# Patient Record
Sex: MALE | Race: WHITE | Employment: OTHER | URBAN - METROPOLITAN AREA
[De-identification: names, ages, dates, MRNs, and addresses within clinical notes are randomized per-mention and may not be internally consistent; named-entity substitution may affect disease eponyms.]

---

## 2018-07-24 ENCOUNTER — LAB ENCOUNTER (OUTPATIENT)
Dept: LAB | Facility: HOSPITAL | Age: 78
End: 2018-07-24
Attending: FAMILY MEDICINE
Payer: MEDICARE

## 2018-07-24 DIAGNOSIS — R17 SCLERAL ICTERUS: ICD-10-CM

## 2018-07-24 DIAGNOSIS — R11.0 NAUSEA: Primary | ICD-10-CM

## 2018-07-24 LAB
ALBUMIN SERPL-MCNC: 3.3 G/DL (ref 3.5–4.8)
ALBUMIN/GLOB SERPL: 0.7 {RATIO} (ref 1–2)
ALP LIVER SERPL-CCNC: 298 U/L (ref 45–117)
ALT SERPL-CCNC: 431 U/L (ref 17–63)
AMYLASE SERPL-CCNC: 43 U/L (ref 25–115)
ANION GAP SERPL CALC-SCNC: 9 MMOL/L (ref 0–18)
AST SERPL-CCNC: 147 U/L (ref 15–41)
BASOPHILS # BLD AUTO: 0.03 X10(3) UL (ref 0–0.1)
BASOPHILS NFR BLD AUTO: 0.6 %
BILIRUB SERPL-MCNC: 3.5 MG/DL (ref 0.1–2)
BUN BLD-MCNC: 13 MG/DL (ref 8–20)
BUN/CREAT SERPL: 10.8 (ref 10–20)
CALCIUM BLD-MCNC: 8.9 MG/DL (ref 8.3–10.3)
CHLORIDE SERPL-SCNC: 104 MMOL/L (ref 101–111)
CO2 SERPL-SCNC: 23 MMOL/L (ref 22–32)
CREAT BLD-MCNC: 1.2 MG/DL (ref 0.7–1.3)
EOSINOPHIL # BLD AUTO: 0.22 X10(3) UL (ref 0–0.3)
EOSINOPHIL NFR BLD AUTO: 4.1 %
ERYTHROCYTE [DISTWIDTH] IN BLOOD BY AUTOMATED COUNT: 13.8 % (ref 11.5–16)
GLOBULIN PLAS-MCNC: 4.5 G/DL (ref 2.5–3.7)
GLUCOSE BLD-MCNC: 261 MG/DL (ref 70–99)
HCT VFR BLD AUTO: 46.6 % (ref 37–53)
HGB BLD-MCNC: 14.6 G/DL (ref 13–17)
IMMATURE GRANULOCYTE COUNT: 0.01 X10(3) UL (ref 0–1)
IMMATURE GRANULOCYTE RATIO %: 0.2 %
LYMPHOCYTES # BLD AUTO: 1.35 X10(3) UL (ref 0.9–4)
LYMPHOCYTES NFR BLD AUTO: 25.3 %
M PROTEIN MFR SERPL ELPH: 7.8 G/DL (ref 6.1–8.3)
MCH RBC QN AUTO: 31.9 PG (ref 27–33.2)
MCHC RBC AUTO-ENTMCNC: 31.3 G/DL (ref 31–37)
MCV RBC AUTO: 102 FL (ref 80–99)
MONOCYTES # BLD AUTO: 0.47 X10(3) UL (ref 0.1–1)
MONOCYTES NFR BLD AUTO: 8.8 %
NEUTROPHIL ABS PRELIM: 3.25 X10 (3) UL (ref 1.3–6.7)
NEUTROPHILS # BLD AUTO: 3.25 X10(3) UL (ref 1.3–6.7)
NEUTROPHILS NFR BLD AUTO: 61 %
OSMOLALITY SERPL CALC.SUM OF ELEC: 291 MOSM/KG (ref 275–295)
PLATELET # BLD AUTO: 217 10(3)UL (ref 150–450)
POTASSIUM SERPL-SCNC: 4.3 MMOL/L (ref 3.6–5.1)
RBC # BLD AUTO: 4.57 X10(6)UL (ref 3.8–5.8)
RED CELL DISTRIBUTION WIDTH-SD: 52.9 FL (ref 35.1–46.3)
SODIUM SERPL-SCNC: 136 MMOL/L (ref 136–144)
WBC # BLD AUTO: 5.3 X10(3) UL (ref 4–13)

## 2018-07-24 PROCEDURE — 82150 ASSAY OF AMYLASE: CPT

## 2018-07-24 PROCEDURE — 85025 COMPLETE CBC W/AUTO DIFF WBC: CPT

## 2018-07-24 PROCEDURE — 80053 COMPREHEN METABOLIC PANEL: CPT

## 2018-07-24 PROCEDURE — 36415 COLL VENOUS BLD VENIPUNCTURE: CPT

## 2018-07-26 ENCOUNTER — ANESTHESIA EVENT (OUTPATIENT)
Dept: ENDOSCOPY | Facility: HOSPITAL | Age: 78
End: 2018-07-26

## 2018-07-26 ENCOUNTER — LAB ENCOUNTER (OUTPATIENT)
Dept: LAB | Facility: HOSPITAL | Age: 78
End: 2018-07-26
Attending: FAMILY MEDICINE
Payer: MEDICARE

## 2018-07-26 DIAGNOSIS — R97.8 OTHER ABNORMAL TUMOR MARKERS: ICD-10-CM

## 2018-07-26 DIAGNOSIS — K83.1 OBSTRUCTION OF BILE DUCT: Primary | ICD-10-CM

## 2018-07-26 DIAGNOSIS — K83.8 BILE DUCT PROLIFERATION: ICD-10-CM

## 2018-07-26 LAB
AFP-TM SERPL-MCNC: 1.1 NG/ML (ref ?–8)
ALBUMIN SERPL-MCNC: 3.2 G/DL (ref 3.5–4.8)
ALBUMIN/GLOB SERPL: 0.7 {RATIO} (ref 1–2)
ALP LIVER SERPL-CCNC: 308 U/L (ref 45–117)
ALT SERPL-CCNC: 448 U/L (ref 17–63)
ANION GAP SERPL CALC-SCNC: 8 MMOL/L (ref 0–18)
AST SERPL-CCNC: 172 U/L (ref 15–41)
BASOPHILS # BLD AUTO: 0.03 X10(3) UL (ref 0–0.1)
BASOPHILS NFR BLD AUTO: 0.6 %
BILIRUB SERPL-MCNC: 5.2 MG/DL (ref 0.1–2)
BUN BLD-MCNC: 10 MG/DL (ref 8–20)
BUN/CREAT SERPL: 8.2 (ref 10–20)
CALCIUM BLD-MCNC: 8.7 MG/DL (ref 8.3–10.3)
CANCER AG19-9 SERPL-ACNC: 66.1 U/ML (ref ?–37)
CEA SERPL-MCNC: 3.1 NG/ML (ref 0.5–5)
CHLORIDE SERPL-SCNC: 107 MMOL/L (ref 101–111)
CO2 SERPL-SCNC: 25 MMOL/L (ref 22–32)
CREAT BLD-MCNC: 1.22 MG/DL (ref 0.7–1.3)
EOSINOPHIL # BLD AUTO: 0.19 X10(3) UL (ref 0–0.3)
EOSINOPHIL NFR BLD AUTO: 4.1 %
ERYTHROCYTE [DISTWIDTH] IN BLOOD BY AUTOMATED COUNT: 13.8 % (ref 11.5–16)
GLOBULIN PLAS-MCNC: 4.4 G/DL (ref 2.5–3.7)
GLUCOSE BLD-MCNC: 287 MG/DL (ref 70–99)
HCT VFR BLD AUTO: 44 % (ref 37–53)
HGB BLD-MCNC: 14 G/DL (ref 13–17)
IMMATURE GRANULOCYTE COUNT: 0 X10(3) UL (ref 0–1)
IMMATURE GRANULOCYTE RATIO %: 0 %
LYMPHOCYTES # BLD AUTO: 1.19 X10(3) UL (ref 0.9–4)
LYMPHOCYTES NFR BLD AUTO: 25.5 %
M PROTEIN MFR SERPL ELPH: 7.6 G/DL (ref 6.1–8.3)
MCH RBC QN AUTO: 32.1 PG (ref 27–33.2)
MCHC RBC AUTO-ENTMCNC: 31.8 G/DL (ref 31–37)
MCV RBC AUTO: 100.9 FL (ref 80–99)
MONOCYTES # BLD AUTO: 0.45 X10(3) UL (ref 0.1–1)
MONOCYTES NFR BLD AUTO: 9.7 %
NEUTROPHIL ABS PRELIM: 2.8 X10 (3) UL (ref 1.3–6.7)
NEUTROPHILS # BLD AUTO: 2.8 X10(3) UL (ref 1.3–6.7)
NEUTROPHILS NFR BLD AUTO: 60.1 %
OSMOLALITY SERPL CALC.SUM OF ELEC: 300 MOSM/KG (ref 275–295)
PLATELET # BLD AUTO: 194 10(3)UL (ref 150–450)
POTASSIUM SERPL-SCNC: 4.1 MMOL/L (ref 3.6–5.1)
RBC # BLD AUTO: 4.36 X10(6)UL (ref 3.8–5.8)
RED CELL DISTRIBUTION WIDTH-SD: 51.6 FL (ref 35.1–46.3)
SODIUM SERPL-SCNC: 140 MMOL/L (ref 136–144)
WBC # BLD AUTO: 4.7 X10(3) UL (ref 4–13)

## 2018-07-26 PROCEDURE — 36415 COLL VENOUS BLD VENIPUNCTURE: CPT

## 2018-07-26 PROCEDURE — 82105 ALPHA-FETOPROTEIN SERUM: CPT

## 2018-07-26 PROCEDURE — 86301 IMMUNOASSAY TUMOR CA 19-9: CPT

## 2018-07-26 PROCEDURE — 85025 COMPLETE CBC W/AUTO DIFF WBC: CPT

## 2018-07-26 PROCEDURE — 82378 CARCINOEMBRYONIC ANTIGEN: CPT

## 2018-07-26 PROCEDURE — 80053 COMPREHEN METABOLIC PANEL: CPT

## 2018-07-26 RX ORDER — RIBOFLAVIN (VITAMIN B2) 100 MG
100 TABLET ORAL DAILY
COMMUNITY
End: 2018-08-03

## 2018-07-26 RX ORDER — SODIUM CHLORIDE, SODIUM LACTATE, POTASSIUM CHLORIDE, CALCIUM CHLORIDE 600; 310; 30; 20 MG/100ML; MG/100ML; MG/100ML; MG/100ML
INJECTION, SOLUTION INTRAVENOUS CONTINUOUS
Status: DISCONTINUED | OUTPATIENT
Start: 2018-07-27 | End: 2018-07-27

## 2018-07-26 RX ORDER — SODIUM CHLORIDE, SODIUM LACTATE, POTASSIUM CHLORIDE, CALCIUM CHLORIDE 600; 310; 30; 20 MG/100ML; MG/100ML; MG/100ML; MG/100ML
INJECTION, SOLUTION INTRAVENOUS CONTINUOUS
Status: CANCELLED | OUTPATIENT
Start: 2018-07-26

## 2018-07-26 RX ORDER — UBIDECARENONE 75 MG
250 CAPSULE ORAL DAILY
COMMUNITY
End: 2018-08-03

## 2018-07-26 RX ORDER — PIOGLITAZONEHYDROCHLORIDE 15 MG/1
15 TABLET ORAL DAILY
COMMUNITY
End: 2018-08-03

## 2018-07-26 RX ORDER — CHLORAL HYDRATE 500 MG
1000 CAPSULE ORAL DAILY
COMMUNITY
End: 2018-08-03

## 2018-07-27 ENCOUNTER — HOSPITAL ENCOUNTER (OUTPATIENT)
Facility: HOSPITAL | Age: 78
Discharge: HOME | End: 2018-07-28
Attending: INTERNAL MEDICINE | Admitting: INTERNAL MEDICINE
Payer: MEDICARE

## 2018-07-27 ENCOUNTER — APPOINTMENT (OUTPATIENT)
Dept: CT IMAGING | Facility: HOSPITAL | Age: 78
End: 2018-07-27
Attending: NURSE PRACTITIONER
Payer: MEDICARE

## 2018-07-27 ENCOUNTER — SURGERY (OUTPATIENT)
Age: 78
End: 2018-07-27

## 2018-07-27 ENCOUNTER — ANESTHESIA (OUTPATIENT)
Dept: ENDOSCOPY | Facility: HOSPITAL | Age: 78
End: 2018-07-27

## 2018-07-27 ENCOUNTER — APPOINTMENT (OUTPATIENT)
Dept: GENERAL RADIOLOGY | Facility: HOSPITAL | Age: 78
End: 2018-07-27
Attending: INTERNAL MEDICINE
Payer: MEDICARE

## 2018-07-27 ENCOUNTER — TELEPHONE (OUTPATIENT)
Dept: SURGERY | Facility: CLINIC | Age: 78
End: 2018-07-27

## 2018-07-27 DIAGNOSIS — R74.02 NONSPECIFIC ELEVATION OF LEVELS OF TRANSAMINASE OR LACTIC ACID DEHYDROGENASE (LDH): ICD-10-CM

## 2018-07-27 DIAGNOSIS — R93.3 ABNORMAL FINDING ON GI TRACT IMAGING: ICD-10-CM

## 2018-07-27 DIAGNOSIS — R74.01 NONSPECIFIC ELEVATION OF LEVELS OF TRANSAMINASE OR LACTIC ACID DEHYDROGENASE (LDH): ICD-10-CM

## 2018-07-27 DIAGNOSIS — R17 JAUNDICE: ICD-10-CM

## 2018-07-27 LAB
EST. AVERAGE GLUCOSE BLD GHB EST-MCNC: 197 MG/DL (ref 68–126)
GLUCOSE BLD-MCNC: 179 MG/DL (ref 65–99)
GLUCOSE BLD-MCNC: 184 MG/DL (ref 65–99)
GLUCOSE BLD-MCNC: 212 MG/DL (ref 65–99)
GLUCOSE BLD-MCNC: 221 MG/DL (ref 65–99)
GLUCOSE BLD-MCNC: 241 MG/DL (ref 65–99)
HBA1C MFR BLD HPLC: 8.5 % (ref ?–5.7)

## 2018-07-27 PROCEDURE — 74178 CT ABD&PLV WO CNTR FLWD CNTR: CPT | Performed by: NURSE PRACTITIONER

## 2018-07-27 PROCEDURE — 0F7D8DZ DILATION OF PANCREATIC DUCT WITH INTRALUMINAL DEVICE, VIA NATURAL OR ARTIFICIAL OPENING ENDOSCOPIC: ICD-10-PCS | Performed by: INTERNAL MEDICINE

## 2018-07-27 PROCEDURE — 99205 OFFICE O/P NEW HI 60 MIN: CPT | Performed by: INTERNAL MEDICINE

## 2018-07-27 PROCEDURE — 74328 X-RAY BILE DUCT ENDOSCOPY: CPT | Performed by: INTERNAL MEDICINE

## 2018-07-27 PROCEDURE — 99220 INITIAL OBSERVATION CARE,LEVL III: CPT | Performed by: HOSPITALIST

## 2018-07-27 PROCEDURE — 0F9G8ZX DRAINAGE OF PANCREAS, VIA NATURAL OR ARTIFICIAL OPENING ENDOSCOPIC, DIAGNOSTIC: ICD-10-PCS | Performed by: INTERNAL MEDICINE

## 2018-07-27 PROCEDURE — 0F778DZ DILATION OF COMMON HEPATIC DUCT WITH INTRALUMINAL DEVICE, VIA NATURAL OR ARTIFICIAL OPENING ENDOSCOPIC: ICD-10-PCS | Performed by: INTERNAL MEDICINE

## 2018-07-27 DEVICE — GEENEN SOF-FLEX PANCREATIC STENT
Type: IMPLANTABLE DEVICE | Status: FUNCTIONAL
Brand: GEENEN SOF-FLEX

## 2018-07-27 DEVICE — BILIARY STENT WITH NAVIFLEXTM RX DELIVERY SYSTEM
Type: IMPLANTABLE DEVICE | Status: FUNCTIONAL
Brand: ADVANIX™ BILIARY

## 2018-07-27 RX ORDER — MORPHINE SULFATE 4 MG/ML
2 INJECTION, SOLUTION INTRAMUSCULAR; INTRAVENOUS EVERY 5 MIN PRN
Status: DISCONTINUED | OUTPATIENT
Start: 2018-07-27 | End: 2018-07-27 | Stop reason: HOSPADM

## 2018-07-27 RX ORDER — NALOXONE HYDROCHLORIDE 0.4 MG/ML
80 INJECTION, SOLUTION INTRAMUSCULAR; INTRAVENOUS; SUBCUTANEOUS AS NEEDED
Status: DISCONTINUED | OUTPATIENT
Start: 2018-07-27 | End: 2018-07-27 | Stop reason: HOSPADM

## 2018-07-27 RX ORDER — SODIUM CHLORIDE 9 MG/ML
INJECTION, SOLUTION INTRAVENOUS CONTINUOUS
Status: DISCONTINUED | OUTPATIENT
Start: 2018-07-27 | End: 2018-07-28

## 2018-07-27 RX ORDER — DEXTROSE MONOHYDRATE 25 G/50ML
50 INJECTION, SOLUTION INTRAVENOUS
Status: DISCONTINUED | OUTPATIENT
Start: 2018-07-27 | End: 2018-07-27 | Stop reason: HOSPADM

## 2018-07-27 RX ORDER — ONDANSETRON 2 MG/ML
4 INJECTION INTRAMUSCULAR; INTRAVENOUS AS NEEDED
Status: DISCONTINUED | OUTPATIENT
Start: 2018-07-27 | End: 2018-07-27 | Stop reason: HOSPADM

## 2018-07-27 RX ORDER — HYDROCODONE BITARTRATE AND ACETAMINOPHEN 5; 325 MG/1; MG/1
2 TABLET ORAL EVERY 4 HOURS PRN
Status: DISCONTINUED | OUTPATIENT
Start: 2018-07-27 | End: 2018-07-28

## 2018-07-27 RX ORDER — SODIUM CHLORIDE, SODIUM LACTATE, POTASSIUM CHLORIDE, CALCIUM CHLORIDE 600; 310; 30; 20 MG/100ML; MG/100ML; MG/100ML; MG/100ML
INJECTION, SOLUTION INTRAVENOUS CONTINUOUS
Status: DISCONTINUED | OUTPATIENT
Start: 2018-07-27 | End: 2018-07-28

## 2018-07-27 RX ORDER — INSULIN ASPART 100 [IU]/ML
INJECTION, SOLUTION INTRAVENOUS; SUBCUTANEOUS
Status: COMPLETED
Start: 2018-07-27 | End: 2018-07-27

## 2018-07-27 RX ORDER — LISINOPRIL 10 MG/1
10 TABLET ORAL NIGHTLY
Status: DISCONTINUED | OUTPATIENT
Start: 2018-07-27 | End: 2018-07-28

## 2018-07-27 RX ORDER — METOCLOPRAMIDE HYDROCHLORIDE 5 MG/ML
10 INJECTION INTRAMUSCULAR; INTRAVENOUS AS NEEDED
Status: DISCONTINUED | OUTPATIENT
Start: 2018-07-27 | End: 2018-07-27 | Stop reason: HOSPADM

## 2018-07-27 RX ORDER — HYDROCODONE BITARTRATE AND ACETAMINOPHEN 5; 325 MG/1; MG/1
1 TABLET ORAL EVERY 4 HOURS PRN
Status: DISCONTINUED | OUTPATIENT
Start: 2018-07-27 | End: 2018-07-28

## 2018-07-27 RX ORDER — HYDRALAZINE HYDROCHLORIDE 20 MG/ML
10 INJECTION INTRAMUSCULAR; INTRAVENOUS EVERY 6 HOURS PRN
Status: DISCONTINUED | OUTPATIENT
Start: 2018-07-27 | End: 2018-07-28

## 2018-07-27 RX ORDER — DEXAMETHASONE SODIUM PHOSPHATE 4 MG/ML
4 VIAL (ML) INJECTION AS NEEDED
Status: DISCONTINUED | OUTPATIENT
Start: 2018-07-27 | End: 2018-07-27 | Stop reason: HOSPADM

## 2018-07-27 RX ORDER — DEXTROSE MONOHYDRATE 25 G/50ML
50 INJECTION, SOLUTION INTRAVENOUS
Status: DISCONTINUED | OUTPATIENT
Start: 2018-07-27 | End: 2018-07-28

## 2018-07-27 RX ORDER — INSULIN ASPART 100 [IU]/ML
INJECTION, SOLUTION INTRAVENOUS; SUBCUTANEOUS ONCE
Status: COMPLETED | OUTPATIENT
Start: 2018-07-27 | End: 2018-07-27

## 2018-07-27 RX ORDER — LEVOTHYROXINE SODIUM 0.05 MG/1
50 TABLET ORAL
Status: DISCONTINUED | OUTPATIENT
Start: 2018-07-27 | End: 2018-07-28

## 2018-07-27 NOTE — OPERATIVE REPORT
Jose Alfredo Le Patient Status:  Hospital Outpatient Surgery    11/3/1940 MRN [id number]   [facility] ENDOSCOPY Attending Diane Yoder MD   Hosp Day # 0 PCP Niecy Walker MD     PREOPERATIVE DIAGNOSIS/INDICATION: Obstructive Donalynn Sender edge of a large diverticulum  - ECHO: Imaging was performed through the esophagus, stomach and duodenum.  The subcarinal space and the aortopulmonary window appear wnl, the celiac axis and the left adrenal gland appear wnl, the visualized portions of the le

## 2018-07-27 NOTE — PLAN OF CARE
Patient/Family Goals    • Patient/Family Long Term Goal Not Progressing    • Patient/Family Short Term Goal Not Progressing        Assumed care of patient at 1000. Patient is alert and oriented x4. SB on tele. Oxygenation is 100% on RA. L sounds clear.  VSS

## 2018-07-27 NOTE — PROGRESS NOTES
Full consult dictated. Pt with new onset obstructive jaundice. Admitted and underwent EGD/ERCP/Stent placement and EUS and biopsy with Dr Gabby Hampton. Initial impression of cytology is suspicious. Await final path. CT just done.   There is a pancreatic he

## 2018-07-27 NOTE — OPERATIVE REPORT
Jose Alfredo Le Patient Status:  Hospital Outpatient Surgery    11/3/1940 MRN [id number]   [facility] ENDOSCOPY Attending Diane Yoder MD   Hosp Day # 0 PCP Niecy Walker MD     PREOPERATIVE DIAGNOSIS/INDICATION: Pancreas head ma cannulation was performed with the help of a 0.035 straight Hydra jagwire 260cm in length, an adequate biliary sphincterotomy was performed with standard ERBE settings, there were no immediate complication noted.  We placed a biliary Cotton-Obando plastic st

## 2018-07-27 NOTE — ANESTHESIA POSTPROCEDURE EVALUATION
[address]  Patient Status:  Hospital Outpatient Surgery   Age/Gender 65year old male MRN [id number]   Location [address] [place] [address]. Attending Sammy Rhodes MD   Hosp Day # 0 PCP Sheela Bal MD       Anesthesia Post-op

## 2018-07-27 NOTE — H&P
[facility] HOSPITALIST  History and Physical     Gypsy Rogers Patient Status:  Observation    11/3/1940 MRN [id number]   [facility] 8NE-A Attending Tim Perez MD   Hosp Day # 0 PCP Francesca Mae MD     Chief Complaint: pancreati grafts    • Prostatitis    • Sputum production    • Stress    • Thyroid disease    • Type II or unspecified type diabetes mellitus without mention of complication, not stated as uncontrolled    • Unspecified essential hypertension    • Visual impairment comprehensive 14 point review of systems was completed. Pertinent positives and negatives noted in the HPI.     Physical Exam:    BP (!) 177/80   Pulse 55   Temp 98.7 °F (37.1 °C) (Oral)   Resp 11   Ht 6' (1.829 m)   Wt 218 lb (98.9 kg)   SpO2 95%   BMI today  6. Continue NPO until CT completed  2. Essential hypertension  1. Resume lisinopril and veramapril  2. PRN hydralazine  3. DM type II  1. Hyperglycemia protocol  4. Elevated LFTs, obstructive jaundice due to #1  5.  Hypothyroidism  1. levothyroxine

## 2018-07-27 NOTE — PROGRESS NOTES
07/27/18 1249   Clinical Encounter Type   Referral From Nurse   [facility] To ([facility])   [address] will remain available at the pager # [phone] for further care/support

## 2018-07-27 NOTE — PROGRESS NOTES
07/27/18 1342   Clinical Encounter Type   Visited With Health care provider;Patient and family together  (RN, daughter)   Routine Visit Introduction   Continue Visiting No    met with patient and daughter.   Patient stated that he wanted to compl

## 2018-07-27 NOTE — ANESTHESIA PREPROCEDURE EVALUATION
PRE-OP EVALUATION    Patient Name: Kaitlynn Monk    Pre-op Diagnosis: Nonspecific elevation of levels of transaminase or lactic acid dehydrogenase (LDH) [R74.0]  Jaundice [R17]  Abnormal finding on GI tract imaging [R93.3]    Procedure(s):  ENDOSCOPIC nightly. Disp:  Rfl:    VERAPAMIL HCL ER, CO, OR Take 100 mg by mouth nightly. Disp:  Rfl:    Cetirizine HCl (ZYRTEC OR) Take 10 mg by mouth daily. Disp:  Rfl:    VENLAFAXINE HCL ER OR Take 75 mg by mouth daily.    Disp:  Rfl:    aspirin 325 MG Oral T Pulmonary      Breath sounds clear to auscultation bilaterally. Other findings            ASA: 3   Plan: MAC  NPO status verified and patient meets guidelines. Patient has not taken beta blockers in last 24 hours.   Post-procedure pain manage

## 2018-07-27 NOTE — PROGRESS NOTES
07/27/18 1412   Clinical Encounter Type   Visited With Family   Routine Visit Follow-up   Sacramental Encounters   Sacrament of Sick-Anointing Anointed   The patient was seen by Radha Mcdaniel.  Received prayer, Scripture, support and Sacrament

## 2018-07-27 NOTE — H&P
[facility] Patient Status:  Hospital Outpatient Surgery    11/3/1940 MRN [id number]   [facility] ENDOSCOPY Attending Derrick Sanches MD   Hosp Day # 0 PCP Rona Beasley MD     CC: Luca Rocha MAIN               OR  No date: SKIN SURGERY  No date: TONSILLECTOMY  No date: TOTAL HIP REPLACEMENT  Family History   Problem Relation Age of Onset   • Diabetes Father    • Cancer Father    • Cancer Mother    • Cancer Sister       reports that he has neve injection 10 mg, 10 mg, Intravenous, PRN  •  dexamethasone Sodium Phosphate (DECADRON) 4 MG/ML injection 4 mg, 4 mg, Intravenous, PRN  •  Naloxone HCl (NARCAN) 0.4 MG/ML injection 80 mcg, 80 mcg, Intravenous, PRN    Physical Exam:    Blood pressure (!) 168

## 2018-07-28 ENCOUNTER — APPOINTMENT (OUTPATIENT)
Dept: MRI IMAGING | Facility: HOSPITAL | Age: 78
End: 2018-07-28
Attending: INTERNAL MEDICINE
Payer: MEDICARE

## 2018-07-28 VITALS
SYSTOLIC BLOOD PRESSURE: 162 MMHG | OXYGEN SATURATION: 94 % | BODY MASS INDEX: 29.42 KG/M2 | WEIGHT: 217.19 LBS | HEIGHT: 72 IN | TEMPERATURE: 99 F | DIASTOLIC BLOOD PRESSURE: 67 MMHG | RESPIRATION RATE: 16 BRPM | HEART RATE: 62 BPM

## 2018-07-28 LAB
ALBUMIN SERPL-MCNC: 2.8 G/DL (ref 3.5–4.8)
ALBUMIN/GLOB SERPL: 0.7 {RATIO} (ref 1–2)
ALP LIVER SERPL-CCNC: 248 U/L (ref 45–117)
ALT SERPL-CCNC: 302 U/L (ref 17–63)
ANION GAP SERPL CALC-SCNC: 7 MMOL/L (ref 0–18)
AST SERPL-CCNC: 65 U/L (ref 15–41)
BILIRUB SERPL-MCNC: 2.6 MG/DL (ref 0.1–2)
BUN BLD-MCNC: 10 MG/DL (ref 8–20)
BUN/CREAT SERPL: 8.1 (ref 10–20)
CALCIUM BLD-MCNC: 8.4 MG/DL (ref 8.3–10.3)
CHLORIDE SERPL-SCNC: 107 MMOL/L (ref 101–111)
CO2 SERPL-SCNC: 29 MMOL/L (ref 22–32)
CREAT BLD-MCNC: 1.23 MG/DL (ref 0.7–1.3)
GLOBULIN PLAS-MCNC: 3.8 G/DL (ref 2.5–3.7)
GLUCOSE BLD-MCNC: 182 MG/DL (ref 65–99)
GLUCOSE BLD-MCNC: 186 MG/DL (ref 65–99)
GLUCOSE BLD-MCNC: 188 MG/DL (ref 70–99)
M PROTEIN MFR SERPL ELPH: 6.6 G/DL (ref 6.1–8.3)
OSMOLALITY SERPL CALC.SUM OF ELEC: 300 MOSM/KG (ref 275–295)
POTASSIUM SERPL-SCNC: 3.6 MMOL/L (ref 3.6–5.1)
SODIUM SERPL-SCNC: 143 MMOL/L (ref 136–144)

## 2018-07-28 PROCEDURE — 99214 OFFICE O/P EST MOD 30 MIN: CPT | Performed by: INTERNAL MEDICINE

## 2018-07-28 PROCEDURE — 99217 OBSERVATION CARE DISCHARGE: CPT | Performed by: INTERNAL MEDICINE

## 2018-07-28 PROCEDURE — 74183 MRI ABD W/O CNTR FLWD CNTR: CPT | Performed by: INTERNAL MEDICINE

## 2018-07-28 RX ORDER — LEVOFLOXACIN 500 MG/1
500 TABLET, FILM COATED ORAL DAILY
Qty: 7 TABLET | Refills: 0 | Status: SHIPPED | OUTPATIENT
Start: 2018-07-28 | End: 2018-08-24 | Stop reason: ALTCHOICE

## 2018-07-28 RX ORDER — HYDROCODONE BITARTRATE AND ACETAMINOPHEN 5; 325 MG/1; MG/1
1 TABLET ORAL EVERY 4 HOURS PRN
Qty: 100 TABLET | Refills: 0 | Status: SHIPPED | OUTPATIENT
Start: 2018-07-28 | End: 2018-09-07

## 2018-07-28 NOTE — PROGRESS NOTES
[facility] HOSPITALIST  Progress Note     Darien Connjaspreet Patient Status:  Observation    11/3/1940 MRN [id number]   [facility] 8NE-A Attending Martin Murphy MD   Hosp Day # 0 PCP Jerel Everett MD     Chief Complaint: pancreatic head mas breakfast   • lisinopril  10 mg Oral Nightly   • Venlafaxine HCl ER  75 mg Oral Daily   • Verapamil HCl ER  120 mg Oral Nightly   • insulin detemir  45 Units Subcutaneous Daily   • Insulin Aspart Pen  1-10 Units Subcutaneous TID AC and HS       ASSESSMENT

## 2018-07-28 NOTE — PLAN OF CARE
Problem: GASTROINTESTINAL - ADULT  Goal: Minimal or absence of nausea and vomiting  INTERVENTIONS:  - Maintain adequate hydration with IV or PO as ordered and tolerated  - Nasogastric tube to low intermittent suction as ordered  - Evaluate effectiveness of eating and he would eat solid food it wouldn't stay down. He's been on liquids and has been able to keep it all down and stated his urine output increased but it remains dark in color - it is phu but clear. He c/o pain to his back and norco helps.   The

## 2018-07-28 NOTE — CONSULTS
[facility]    PATIENT'S NAME: Vandana oYussef   ATTENDING PHYSICIAN: Ole Bynum MD   CONSULTING PHYSICIAN: Sharlene Mata M.D.    PATIENT ACCOUNT#:   [de-identified]    LOCATION:  [address] [place] [address]  MEDICAL RECORD #:   [id number]       DATE OF BIR area of decreased attenuation near the falciform ligament, medial segment of the left lobe in hepatic segment 4. It is either focal fat or neoplasm.  Recommended a followup MRI with [facility].  He had a pancreatic head mass that was 2.8 x 2.9 x 2.3 cm; it abu father having  at the age of 72; pneumonia, COPD; he had diabetes and leukemia. His mother  at the age of 77; she had Parkinson disease and fell. He is 1 of 4 siblings. He has one brother who  as a baby.   He has one brother who  at 64 of that the patient has a primary pancreatic carcinoma. I explained this to them. I also explained this is a difficult cancer for us to treat.   The majority of patients who are diagnosed with pancreatic cancer are unresectable at the time of diagnosis, ofte Wednesday. Dictated By Heike Fregoso M.D.  d: 07/27/2018 17:21:59  t: 07/27/2018 17:43:22  Job [id number]  /    cc: Altamease Hertz, M.D. Alphia Panda, M.D. Dayne Lash, M.D. Phillip Rinne, M.D.

## 2018-07-28 NOTE — PROGRESS NOTES
Patient was given discharge instructions in regards to medications, s/s to monitor for, follow up appointments, and activity level. All questions were answered. IV was removed. Patient was removed from tele. Patient was escorted off the unit by transport.

## 2018-07-28 NOTE — DISCHARGE SUMMARY
[facility] HOSPITALIST  DISCHARGE SUMMARY     Gypsy Rogers Patient Status:  Observation    11/3/1940 MRN [id number]   [facility] 8NE-A Attending Brady Martinez MD   Hosp Day # 0 PCP Francesca Mae MD     Date of Admission: 2018  Da intensity. He states it does radiate to his back. He denies any nausea, headache, tingling or numbness    Brief Synopsis: Pt was admitted after procedure and was seen by GI and oncology.  He underwent CT and MRI prior to DC and will follow up with GI and by mouth daily. Refills:  0     Pioglitazone HCl 15 MG Tabs  Commonly known as:  ACTOS  Notes to patient:  Diabetes medication      Take 15 mg by mouth daily.    Refills:  0     VENLAFAXINE HCL ER OR  Notes to patient:  Muscle relaxer/ chill pill      Eloina Watts

## 2018-07-28 NOTE — CM/SW NOTE
Briefly met with patient and daughters at bedside. Patient expressed interest in care giver support as his wife just completed rehab at [facility] for a stroke and he is the primary caregiver.   Cognitively, patient alert, just 'doesn't want to leave her a

## 2018-07-28 NOTE — PROGRESS NOTES
[facility]  Progress Note    Taj Rodriguez Patient Status:  Outpatient in a Bed    11/3/1940 MRN [id number]   [facility] 8NE-A Attending David Armstrong MD   Hosp Day # 0 PCP Dona Alexander MD       SUBJECTIVE:    C/o slight back distress. Chest: Clear to auscultation. Heart: Regular rate and rhythm. Abdomen: Soft, non tender with good bowel sounds. Extremities: No edema. Neurological: Grossly intact.      RADIOLOGY:    ASSESSMENT/PLAN:    # Obstructive jaundice: S/p ERCP, zee

## 2018-07-30 ENCOUNTER — TELEPHONE (OUTPATIENT)
Dept: HEMATOLOGY/ONCOLOGY | Facility: HOSPITAL | Age: 78
End: 2018-07-30

## 2018-07-30 RX ORDER — LEVOFLOXACIN 500 MG/1
TABLET, FILM COATED ORAL
Qty: 7 TABLET | Refills: 0 | OUTPATIENT
Start: 2018-07-30

## 2018-07-30 NOTE — CM/SW NOTE
As follow up, call placed to patient regarding caregiver recommendations after speaking with the outpatient cancer care social workers.   Informed the patient that they have received positive feedback from patients/families that have used visiting angels (6

## 2018-07-31 ENCOUNTER — APPOINTMENT (OUTPATIENT)
Dept: LAB | Facility: HOSPITAL | Age: 78
End: 2018-07-31
Attending: STUDENT IN AN ORGANIZED HEALTH CARE EDUCATION/TRAINING PROGRAM
Payer: MEDICARE

## 2018-07-31 ENCOUNTER — HOSPITAL ENCOUNTER (OUTPATIENT)
Dept: GENERAL RADIOLOGY | Facility: HOSPITAL | Age: 78
Discharge: HOME | End: 2018-07-31
Attending: STUDENT IN AN ORGANIZED HEALTH CARE EDUCATION/TRAINING PROGRAM
Payer: MEDICARE

## 2018-07-31 DIAGNOSIS — R93.3 ABNORMAL FINDING ON GI TRACT IMAGING: ICD-10-CM

## 2018-07-31 DIAGNOSIS — R17 JAUNDICE: ICD-10-CM

## 2018-07-31 LAB
ALBUMIN SERPL-MCNC: 2.9 G/DL (ref 3.5–4.8)
ALBUMIN/GLOB SERPL: 0.6 {RATIO} (ref 1–2)
ALP LIVER SERPL-CCNC: 226 U/L (ref 45–117)
ALT SERPL-CCNC: 140 U/L (ref 17–63)
ANION GAP SERPL CALC-SCNC: 9 MMOL/L (ref 0–18)
AST SERPL-CCNC: 24 U/L (ref 15–41)
BILIRUB SERPL-MCNC: 1.4 MG/DL (ref 0.1–2)
BUN BLD-MCNC: 19 MG/DL (ref 8–20)
BUN/CREAT SERPL: 15.3 (ref 10–20)
CALCIUM BLD-MCNC: 8.5 MG/DL (ref 8.3–10.3)
CHLORIDE SERPL-SCNC: 99 MMOL/L (ref 101–111)
CO2 SERPL-SCNC: 30 MMOL/L (ref 22–32)
CREAT BLD-MCNC: 1.24 MG/DL (ref 0.7–1.3)
GLOBULIN PLAS-MCNC: 4.5 G/DL (ref 2.5–3.7)
GLUCOSE BLD-MCNC: 386 MG/DL (ref 70–99)
M PROTEIN MFR SERPL ELPH: 7.4 G/DL (ref 6.1–8.3)
OSMOLALITY SERPL CALC.SUM OF ELEC: 304 MOSM/KG (ref 275–295)
POTASSIUM SERPL-SCNC: 4 MMOL/L (ref 3.6–5.1)
SODIUM SERPL-SCNC: 138 MMOL/L (ref 136–144)

## 2018-07-31 PROCEDURE — 36415 COLL VENOUS BLD VENIPUNCTURE: CPT

## 2018-07-31 PROCEDURE — 80053 COMPREHEN METABOLIC PANEL: CPT

## 2018-07-31 PROCEDURE — 74021 RADEX ABDOMEN 3+ VIEWS: CPT | Performed by: STUDENT IN AN ORGANIZED HEALTH CARE EDUCATION/TRAINING PROGRAM

## 2018-08-01 ENCOUNTER — TELEPHONE (OUTPATIENT)
Dept: HEMATOLOGY/ONCOLOGY | Facility: HOSPITAL | Age: 78
End: 2018-08-01

## 2018-08-01 ENCOUNTER — HOSPITAL ENCOUNTER (OUTPATIENT)
Dept: CT IMAGING | Facility: HOSPITAL | Age: 78
Discharge: HOME | End: 2018-08-01
Attending: CLINICAL NURSE SPECIALIST
Payer: MEDICARE

## 2018-08-01 DIAGNOSIS — K86.89 PANCREATIC MASS: ICD-10-CM

## 2018-08-01 DIAGNOSIS — R91.8 MULTIPLE LUNG NODULES ON CT: Primary | ICD-10-CM

## 2018-08-01 DIAGNOSIS — R91.8 MULTIPLE LUNG NODULES ON CT: ICD-10-CM

## 2018-08-01 PROCEDURE — 71260 CT THORAX DX C+: CPT | Performed by: CLINICAL NURSE SPECIALIST

## 2018-08-01 NOTE — TELEPHONE ENCOUNTER
Spoke with patient's daughter regarding plan: patient to get CT of Chest prior to Friday. MRI liver was negative. Transferred her to scheduling to arrange CT of Chest, she will call if issues scheduling. They will see Dr Kendell Waite on Friday.

## 2018-08-03 ENCOUNTER — OFFICE VISIT (OUTPATIENT)
Dept: HEMATOLOGY/ONCOLOGY | Facility: HOSPITAL | Age: 78
End: 2018-08-03
Attending: INTERNAL MEDICINE
Payer: MEDICARE

## 2018-08-03 VITALS
RESPIRATION RATE: 18 BRPM | WEIGHT: 217.81 LBS | HEART RATE: 76 BPM | TEMPERATURE: 98 F | OXYGEN SATURATION: 92 % | HEIGHT: 72.01 IN | DIASTOLIC BLOOD PRESSURE: 74 MMHG | BODY MASS INDEX: 29.5 KG/M2 | SYSTOLIC BLOOD PRESSURE: 144 MMHG

## 2018-08-03 DIAGNOSIS — C25.0 MALIGNANT NEOPLASM OF HEAD OF PANCREAS (HCC): Primary | ICD-10-CM

## 2018-08-03 PROCEDURE — 99214 OFFICE O/P EST MOD 30 MIN: CPT | Performed by: INTERNAL MEDICINE

## 2018-08-03 NOTE — PROGRESS NOTES
Patient is here for MD post hospital f/u. Dx with a pancreatic mass with obstructive jaundice. Patient had an ERCP with stent placement. Jaundice improved. Pain and nausea have improved. Appetite is good. No longer having headaches.  Patient had a ct scan o

## 2018-08-07 NOTE — PROGRESS NOTES
[facility]    PATIENT'S NAME: Addis Pereiraclair   ATTENDING PHYSICIAN: Godfrey Batres M.D.    PATIENT ACCOUNT #: [de-identified] LOCATION: [address] [place] [address] RECORD #: [id number] YOB: 1940   DATE OF SERVICE: 08/03/2018       CANCER CE Blood pressure is 144/74, pulse 76, respiratory rate is 20, temperature is 98. 1. HEENT:  He has no jaundice. LUNGS:  Clear. HEART:  Normal.   ABDOMEN:  No hepatosplenomegaly or tenderness. EXTREMITIES:  He has no clubbing, cyanosis, or edema.      LA visit and tentatively starting treatment on the August 10.       Dictated By Gi Sy M.D.  d: 08/06/2018 14:20:56  t: 08/07/2018 05:03:57  [facility] [id number]  [place]/    cc: Purvi Machuca M.D.

## 2018-08-08 ENCOUNTER — HOSPITAL ENCOUNTER (OUTPATIENT)
Facility: HOSPITAL | Age: 78
Setting detail: OBSERVATION
Discharge: HOME | End: 2018-08-09
Attending: EMERGENCY MEDICINE | Admitting: HOSPITALIST
Payer: MEDICARE

## 2018-08-08 DIAGNOSIS — R74.8 ELEVATED LIVER ENZYMES: ICD-10-CM

## 2018-08-08 DIAGNOSIS — K86.89 PANCREATIC MASS: ICD-10-CM

## 2018-08-08 DIAGNOSIS — R50.82 POST-PROCEDURAL FEVER: Primary | ICD-10-CM

## 2018-08-08 PROBLEM — E87.1 HYPONATREMIA: Status: ACTIVE | Noted: 2018-08-08

## 2018-08-08 PROBLEM — R73.9 HYPERGLYCEMIA: Status: ACTIVE | Noted: 2018-08-08

## 2018-08-08 LAB
ALBUMIN SERPL-MCNC: 3.4 G/DL (ref 3.5–4.8)
ALBUMIN/GLOB SERPL: 0.7 {RATIO} (ref 1–2)
ALP LIVER SERPL-CCNC: 317 U/L (ref 45–117)
ALT SERPL-CCNC: 194 U/L (ref 17–63)
ANION GAP SERPL CALC-SCNC: 8 MMOL/L (ref 0–18)
AST SERPL-CCNC: 221 U/L (ref 15–41)
BASOPHILS # BLD AUTO: 0.02 X10(3) UL (ref 0–0.1)
BASOPHILS NFR BLD AUTO: 0.3 %
BILIRUB SERPL-MCNC: 1.7 MG/DL (ref 0.1–2)
BILIRUB UR QL STRIP.AUTO: NEGATIVE
BUN BLD-MCNC: 13 MG/DL (ref 8–20)
BUN/CREAT SERPL: 10.9 (ref 10–20)
CALCIUM BLD-MCNC: 8.7 MG/DL (ref 8.3–10.3)
CHLORIDE SERPL-SCNC: 101 MMOL/L (ref 101–111)
CLARITY UR REFRACT.AUTO: CLEAR
CO2 SERPL-SCNC: 26 MMOL/L (ref 22–32)
COLOR UR AUTO: YELLOW
CREAT BLD-MCNC: 1.19 MG/DL (ref 0.7–1.3)
EOSINOPHIL # BLD AUTO: 0.1 X10(3) UL (ref 0–0.3)
EOSINOPHIL NFR BLD AUTO: 1.4 %
ERYTHROCYTE [DISTWIDTH] IN BLOOD BY AUTOMATED COUNT: 12.5 % (ref 11.5–16)
GLOBULIN PLAS-MCNC: 4.9 G/DL (ref 2.5–3.7)
GLUCOSE BLD-MCNC: 292 MG/DL (ref 70–99)
GLUCOSE UR STRIP.AUTO-MCNC: >=500 MG/DL
HCT VFR BLD AUTO: 46.7 % (ref 37–53)
HGB BLD-MCNC: 15.4 G/DL (ref 13–17)
IMMATURE GRANULOCYTE COUNT: 0.03 X10(3) UL (ref 0–1)
IMMATURE GRANULOCYTE RATIO %: 0.4 %
KETONES UR STRIP.AUTO-MCNC: NEGATIVE MG/DL
LACTIC ACID: 1.7 MMOL/L (ref 0.5–2)
LEUKOCYTE ESTERASE UR QL STRIP.AUTO: NEGATIVE
LYMPHOCYTES # BLD AUTO: 0.42 X10(3) UL (ref 0.9–4)
LYMPHOCYTES NFR BLD AUTO: 5.7 %
M PROTEIN MFR SERPL ELPH: 8.3 G/DL (ref 6.1–8.3)
MCH RBC QN AUTO: 32.1 PG (ref 27–33.2)
MCHC RBC AUTO-ENTMCNC: 33 G/DL (ref 31–37)
MCV RBC AUTO: 97.3 FL (ref 80–99)
MONOCYTES # BLD AUTO: 0.39 X10(3) UL (ref 0.1–1)
MONOCYTES NFR BLD AUTO: 5.3 %
NEUTROPHIL ABS PRELIM: 6.38 X10 (3) UL (ref 1.3–6.7)
NEUTROPHILS # BLD AUTO: 6.38 X10(3) UL (ref 1.3–6.7)
NEUTROPHILS NFR BLD AUTO: 86.9 %
NITRITE UR QL STRIP.AUTO: NEGATIVE
OSMOLALITY SERPL CALC.SUM OF ELEC: 291 MOSM/KG (ref 275–295)
PH UR STRIP.AUTO: 5 [PH] (ref 4.5–8)
PLATELET # BLD AUTO: 282 10(3)UL (ref 150–450)
POTASSIUM SERPL-SCNC: 3.9 MMOL/L (ref 3.6–5.1)
PROT UR STRIP.AUTO-MCNC: 30 MG/DL
RBC # BLD AUTO: 4.8 X10(6)UL (ref 3.8–5.8)
RED CELL DISTRIBUTION WIDTH-SD: 44.8 FL (ref 35.1–46.3)
SODIUM SERPL-SCNC: 135 MMOL/L (ref 136–144)
SP GR UR STRIP.AUTO: 1.02 (ref 1–1.03)
UROBILINOGEN UR STRIP.AUTO-MCNC: 2 MG/DL
WBC # BLD AUTO: 7.3 X10(3) UL (ref 4–13)

## 2018-08-08 RX ORDER — AMLODIPINE BESYLATE 5 MG/1
5 TABLET ORAL DAILY
COMMUNITY
End: 2018-08-24

## 2018-08-08 RX ORDER — ACETAMINOPHEN 500 MG
1000 TABLET ORAL ONCE
Status: COMPLETED | OUTPATIENT
Start: 2018-08-08 | End: 2018-08-08

## 2018-08-09 ENCOUNTER — ANESTHESIA (OUTPATIENT)
Dept: ENDOSCOPY | Facility: HOSPITAL | Age: 78
End: 2018-08-09
Payer: MEDICARE

## 2018-08-09 ENCOUNTER — APPOINTMENT (OUTPATIENT)
Dept: GENERAL RADIOLOGY | Facility: HOSPITAL | Age: 78
End: 2018-08-09
Attending: INTERNAL MEDICINE
Payer: MEDICARE

## 2018-08-09 ENCOUNTER — TELEPHONE (OUTPATIENT)
Dept: HEMATOLOGY/ONCOLOGY | Facility: HOSPITAL | Age: 78
End: 2018-08-09

## 2018-08-09 ENCOUNTER — ANESTHESIA EVENT (OUTPATIENT)
Dept: ENDOSCOPY | Facility: HOSPITAL | Age: 78
End: 2018-08-09
Payer: MEDICARE

## 2018-08-09 VITALS
WEIGHT: 218 LBS | HEART RATE: 65 BPM | DIASTOLIC BLOOD PRESSURE: 76 MMHG | HEIGHT: 72 IN | BODY MASS INDEX: 29.53 KG/M2 | TEMPERATURE: 98 F | RESPIRATION RATE: 16 BRPM | OXYGEN SATURATION: 95 % | SYSTOLIC BLOOD PRESSURE: 151 MMHG

## 2018-08-09 LAB
ALBUMIN SERPL-MCNC: 2.8 G/DL (ref 3.5–4.8)
ALBUMIN/GLOB SERPL: 0.7 {RATIO} (ref 1–2)
ALP LIVER SERPL-CCNC: 255 U/L (ref 45–117)
ALT SERPL-CCNC: 173 U/L (ref 17–63)
ANION GAP SERPL CALC-SCNC: 5 MMOL/L (ref 0–18)
AST SERPL-CCNC: 135 U/L (ref 15–41)
BASOPHILS # BLD AUTO: 0.02 X10(3) UL (ref 0–0.1)
BASOPHILS NFR BLD AUTO: 0.3 %
BILIRUB SERPL-MCNC: 1.2 MG/DL (ref 0.1–2)
BUN BLD-MCNC: 12 MG/DL (ref 8–20)
BUN/CREAT SERPL: 12.2 (ref 10–20)
CALCIUM BLD-MCNC: 8.5 MG/DL (ref 8.3–10.3)
CHLORIDE SERPL-SCNC: 108 MMOL/L (ref 101–111)
CO2 SERPL-SCNC: 28 MMOL/L (ref 22–32)
CREAT BLD-MCNC: 0.98 MG/DL (ref 0.7–1.3)
EOSINOPHIL # BLD AUTO: 0.07 X10(3) UL (ref 0–0.3)
EOSINOPHIL NFR BLD AUTO: 1 %
ERYTHROCYTE [DISTWIDTH] IN BLOOD BY AUTOMATED COUNT: 12.5 % (ref 11.5–16)
GLOBULIN PLAS-MCNC: 4 G/DL (ref 2.5–3.7)
GLUCOSE BLD-MCNC: 123 MG/DL (ref 65–99)
GLUCOSE BLD-MCNC: 138 MG/DL (ref 65–99)
GLUCOSE BLD-MCNC: 162 MG/DL (ref 65–99)
GLUCOSE BLD-MCNC: 187 MG/DL (ref 70–99)
GLUCOSE BLD-MCNC: 201 MG/DL (ref 65–99)
GLUCOSE BLD-MCNC: 354 MG/DL (ref 65–99)
HCT VFR BLD AUTO: 41.8 % (ref 37–53)
HGB BLD-MCNC: 13.7 G/DL (ref 13–17)
IMMATURE GRANULOCYTE COUNT: 0.02 X10(3) UL (ref 0–1)
IMMATURE GRANULOCYTE RATIO %: 0.3 %
LYMPHOCYTES # BLD AUTO: 1.21 X10(3) UL (ref 0.9–4)
LYMPHOCYTES NFR BLD AUTO: 17.3 %
M PROTEIN MFR SERPL ELPH: 6.8 G/DL (ref 6.1–8.3)
MCH RBC QN AUTO: 31.6 PG (ref 27–33.2)
MCHC RBC AUTO-ENTMCNC: 32.8 G/DL (ref 31–37)
MCV RBC AUTO: 96.3 FL (ref 80–99)
MONOCYTES # BLD AUTO: 0.65 X10(3) UL (ref 0.1–1)
MONOCYTES NFR BLD AUTO: 9.3 %
NEUTROPHIL ABS PRELIM: 5.02 X10 (3) UL (ref 1.3–6.7)
NEUTROPHILS # BLD AUTO: 5.02 X10(3) UL (ref 1.3–6.7)
NEUTROPHILS NFR BLD AUTO: 71.8 %
OSMOLALITY SERPL CALC.SUM OF ELEC: 297 MOSM/KG (ref 275–295)
PLATELET # BLD AUTO: 237 10(3)UL (ref 150–450)
POTASSIUM SERPL-SCNC: 3.6 MMOL/L (ref 3.6–5.1)
RBC # BLD AUTO: 4.34 X10(6)UL (ref 3.8–5.8)
RED CELL DISTRIBUTION WIDTH-SD: 44.2 FL (ref 35.1–46.3)
SODIUM SERPL-SCNC: 141 MMOL/L (ref 136–144)
WBC # BLD AUTO: 7 X10(3) UL (ref 4–13)

## 2018-08-09 PROCEDURE — 99220 INITIAL OBSERVATION CARE,LEVL III: CPT | Performed by: HOSPITALIST

## 2018-08-09 PROCEDURE — 0FPB8DZ REMOVAL OF INTRALUMINAL DEVICE FROM HEPATOBILIARY DUCT, VIA NATURAL OR ARTIFICIAL OPENING ENDOSCOPIC: ICD-10-PCS | Performed by: INTERNAL MEDICINE

## 2018-08-09 PROCEDURE — 74328 X-RAY BILE DUCT ENDOSCOPY: CPT | Performed by: INTERNAL MEDICINE

## 2018-08-09 PROCEDURE — 99214 OFFICE O/P EST MOD 30 MIN: CPT | Performed by: CLINICAL NURSE SPECIALIST

## 2018-08-09 PROCEDURE — 0F798DZ DILATION OF COMMON BILE DUCT WITH INTRALUMINAL DEVICE, VIA NATURAL OR ARTIFICIAL OPENING ENDOSCOPIC: ICD-10-PCS | Performed by: INTERNAL MEDICINE

## 2018-08-09 RX ORDER — LEVOFLOXACIN 5 MG/ML
750 INJECTION, SOLUTION INTRAVENOUS EVERY 24 HOURS
Status: DISCONTINUED | OUTPATIENT
Start: 2018-08-09 | End: 2018-08-10

## 2018-08-09 RX ORDER — NALOXONE HYDROCHLORIDE 0.4 MG/ML
80 INJECTION, SOLUTION INTRAMUSCULAR; INTRAVENOUS; SUBCUTANEOUS AS NEEDED
Status: DISCONTINUED | OUTPATIENT
Start: 2018-08-09 | End: 2018-08-09 | Stop reason: HOSPADM

## 2018-08-09 RX ORDER — ENOXAPARIN SODIUM 100 MG/ML
40 INJECTION SUBCUTANEOUS DAILY
Status: DISCONTINUED | OUTPATIENT
Start: 2018-08-09 | End: 2018-08-10

## 2018-08-09 RX ORDER — LISINOPRIL 10 MG/1
10 TABLET ORAL NIGHTLY
Status: DISCONTINUED | OUTPATIENT
Start: 2018-08-09 | End: 2018-08-10

## 2018-08-09 RX ORDER — ACETAMINOPHEN 325 MG/1
650 TABLET ORAL EVERY 6 HOURS PRN
Status: DISCONTINUED | OUTPATIENT
Start: 2018-08-09 | End: 2018-08-10

## 2018-08-09 RX ORDER — SODIUM CHLORIDE, SODIUM LACTATE, POTASSIUM CHLORIDE, CALCIUM CHLORIDE 600; 310; 30; 20 MG/100ML; MG/100ML; MG/100ML; MG/100ML
INJECTION, SOLUTION INTRAVENOUS CONTINUOUS
Status: DISCONTINUED | OUTPATIENT
Start: 2018-08-09 | End: 2018-08-09

## 2018-08-09 RX ORDER — CETIRIZINE HYDROCHLORIDE 10 MG/1
10 TABLET ORAL DAILY
Status: DISCONTINUED | OUTPATIENT
Start: 2018-08-09 | End: 2018-08-10

## 2018-08-09 RX ORDER — SODIUM CHLORIDE 9 MG/ML
INJECTION, SOLUTION INTRAVENOUS CONTINUOUS
Status: DISCONTINUED | OUTPATIENT
Start: 2018-08-09 | End: 2018-08-09

## 2018-08-09 RX ORDER — DOCUSATE SODIUM 100 MG/1
100 CAPSULE, LIQUID FILLED ORAL 2 TIMES DAILY
Status: DISCONTINUED | OUTPATIENT
Start: 2018-08-09 | End: 2018-08-10

## 2018-08-09 RX ORDER — LEVOTHYROXINE SODIUM 0.05 MG/1
50 TABLET ORAL
Status: DISCONTINUED | OUTPATIENT
Start: 2018-08-09 | End: 2018-08-10

## 2018-08-09 RX ORDER — METOCLOPRAMIDE HYDROCHLORIDE 5 MG/ML
10 INJECTION INTRAMUSCULAR; INTRAVENOUS EVERY 8 HOURS PRN
Status: DISCONTINUED | OUTPATIENT
Start: 2018-08-09 | End: 2018-08-10

## 2018-08-09 RX ORDER — HYDROCODONE BITARTRATE AND ACETAMINOPHEN 5; 325 MG/1; MG/1
1 TABLET ORAL EVERY 4 HOURS PRN
Status: DISCONTINUED | OUTPATIENT
Start: 2018-08-09 | End: 2018-08-10

## 2018-08-09 RX ORDER — ONDANSETRON 2 MG/ML
4 INJECTION INTRAMUSCULAR; INTRAVENOUS EVERY 6 HOURS PRN
Status: DISCONTINUED | OUTPATIENT
Start: 2018-08-09 | End: 2018-08-10

## 2018-08-09 RX ORDER — PROCHLORPERAZINE MALEATE 10 MG
10 TABLET ORAL EVERY 6 HOURS PRN
Qty: 30 TABLET | Refills: 3 | Status: SHIPPED | OUTPATIENT
Start: 2018-08-09 | End: 2018-09-07

## 2018-08-09 RX ORDER — DIPHENHYDRAMINE HYDROCHLORIDE 50 MG/ML
12.5 INJECTION INTRAMUSCULAR; INTRAVENOUS EVERY 4 HOURS PRN
Status: DISCONTINUED | OUTPATIENT
Start: 2018-08-09 | End: 2018-08-10

## 2018-08-09 RX ORDER — DEXTROSE MONOHYDRATE 25 G/50ML
50 INJECTION, SOLUTION INTRAVENOUS
Status: DISCONTINUED | OUTPATIENT
Start: 2018-08-09 | End: 2018-08-09 | Stop reason: HOSPADM

## 2018-08-09 RX ORDER — LEVOFLOXACIN 750 MG/1
750 TABLET ORAL NIGHTLY
Status: DISCONTINUED | OUTPATIENT
Start: 2018-08-09 | End: 2018-08-09

## 2018-08-09 RX ORDER — ONDANSETRON HYDROCHLORIDE 8 MG/1
8 TABLET, FILM COATED ORAL EVERY 8 HOURS PRN
Qty: 30 TABLET | Refills: 3 | Status: SHIPPED | OUTPATIENT
Start: 2018-08-09 | End: 2018-11-27

## 2018-08-09 RX ORDER — DIPHENHYDRAMINE HCL 25 MG
25 CAPSULE ORAL EVERY 4 HOURS PRN
Status: DISCONTINUED | OUTPATIENT
Start: 2018-08-09 | End: 2018-08-10

## 2018-08-09 RX ORDER — POLYETHYLENE GLYCOL 3350 17 G/17G
17 POWDER, FOR SOLUTION ORAL DAILY PRN
Status: DISCONTINUED | OUTPATIENT
Start: 2018-08-09 | End: 2018-08-10

## 2018-08-09 RX ORDER — DEXTROSE MONOHYDRATE 25 G/50ML
50 INJECTION, SOLUTION INTRAVENOUS
Status: DISCONTINUED | OUTPATIENT
Start: 2018-08-09 | End: 2018-08-10

## 2018-08-09 NOTE — ED INITIAL ASSESSMENT (HPI)
Patient was recently dx with pancreatic cancer and had a stent placed. Yesterday developed pain to the stent area, is to start chemotherapy   + dull headache, weakness, fatigue and fever.

## 2018-08-09 NOTE — ANESTHESIA PREPROCEDURE EVALUATION
PRE-OP EVALUATION    Patient Name: Dario Sapp    Pre-op Diagnosis: BILIARY STENT EXCHANGE    Procedure(s):  ENDOSCOPIC RETROGRADE CHOLANGIOPANCREATOGRAPHY WITH BILIARY STENT EXCHANGE    Surgeon(s) and Role:     Dalia Quintanilla MD - Primary    P Intravenous Q24H   [COMPLETED] sodium chloride 0.9% IV bolus 1,000 mL 1,000 mL Intravenous Once   [COMPLETED] acetaminophen (TYLENOL EXTRA STRENGTH) tab 1,000 mg 1,000 mg Oral Once       Outpatient Medications:     AmLODIPine Besylate 5 MG Oral Tab Take 5 m socially at holidays or special occasions       Drug use: No     Available pre-op labs reviewed.     Lab Results  Component Value Date   WBC 7.0 08/09/2018   RBC 4.34 08/09/2018   HGB 13.7 08/09/2018   HCT 41.8 08/09/2018   MCV 96.3 08/09/2018   MCH 31.6 08

## 2018-08-09 NOTE — CONSULTS
[facility]                       [facility]    Andrew Martinez Patient Status:  Observation    11/3/1940 MRN [id number]   [facility] 3NW-A Attending Vega Altamirano MD   Hosp Day # 0 PCP Ro production    • Stress    • Thyroid disease    • Type II or unspecified type diabetes mellitus without mention of complication, not stated as uncontrolled    • Unspecified essential hypertension    • Visual impairment     glasses   • Wears glasses      PSH Subcutaneous Q6H   potassium chloride 40 mEq in sodium chloride 0.9 % 250 mL IVPB 40 mEq Intravenous Once   [COMPLETED] sodium chloride 0.9% IV bolus 1,000 mL 1,000 mL Intravenous Once   [COMPLETED] acetaminophen (TYLENOL EXTRA STRENGTH) tab 1,000 mg 1,000 nuchal rigidity  CV: Regular rate and rhythm, with normal S1 and S2  Resp: Clear to auscultation bilaterally without wheezes; rubs, rhonchi, or rales  Abdomen: Soft, non-tender with light palpation, non-distended with the presence of bowel sounds;  No hepat possible PD stricture at head, dilated side branches at uncinate r/o pancreatic neoplasm, cystic neoplasm, cholangiocarcinoma  POSTOPERTATIVE DIAGNOSIS: 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

## 2018-08-09 NOTE — TELEPHONE ENCOUNTER
Pt's dtr calling from hospital.  Asking if needing to keep chemo appointment tomorrow in [place]. Reviewed with Macy Maldonado, hospitalization due to disease and he needs to start treatment.   If discharged, he will be seen tomorrow in the office with possible ch

## 2018-08-09 NOTE — ED NOTES
Report given to Marcella Peña RN. X [id number]. Patient and family updated with plan of care, no questions at this time.

## 2018-08-09 NOTE — PROGRESS NOTES
[facility] HOSPITALIST  Progress Note     Denise Ardon Patient Status:  Observation    11/3/1940 MRN [id number]   [facility] 3NW-A Attending Christi Nunn MD;Rico*   Hosp Day # 0 PCP Yoel Jaen Baptiste MD     Chief Complaint: fever    S: Pa • lisinopril  10 mg Oral Nightly   • Venlafaxine HCl ER  75 mg Oral Daily   • Verapamil HCl ER  120 mg Oral Nightly   • enoxaparin  40 mg Subcutaneous Daily   • Insulin Aspart Pen  2-10 Units Subcutaneous Q6H   • potassium chloride 40mEq IVPB (peripheral

## 2018-08-09 NOTE — CONSULTS
Hem/Onc Report of Consultation    Patient Name: Liseth Christie   YOB: 1940   Medical Record Number: [id number]   CSN: [id number]   Consulting Physician: Yumiko Amador MD   Referring Physician(s): Dr Milo Jacobsen  Date of Consultation: 8/9/2018 pain/belching    • Frequent urination    • High blood pressure    • History of blood transfusion 2016   • Irregular bowel habits    • Jaundice    • Kidney stone    • Leg swelling    • Nausea    • Osteoarthritis    • Pain in joints    • Presence of other ca enoxaparin  40 mg Subcutaneous Daily   • Insulin Aspart Pen  2-10 Units Subcutaneous Q6H   • docusate sodium  100 mg Oral BID   • levofloxacin  750 mg Intravenous Q24H        Home Medications:    Current Facility-Administered Medications on File Prior to E (Oral)   Resp 20   Ht 1.829 m (6')   Wt 98.9 kg (218 lb)   SpO2 95%   BMI 29.57 kg/m²     Physical Examination:  General: Patient is alert and oriented x 3, not in acute distress. HEENT: EOMs intact. PERRL. Oropharynx is clear. Neck: No JVD.  No palpable by: Fabien Mack,          Impression/Plan:  1. Pancreatic Cancer: was to start neoadjuvant chemotherapy tomorrow with Gemcitabine/Abraxane. Will reschedule based on discharge. 2. Elevated LFT's: per Dr Carmen Gan, ERCP and stent exchange.      Case discu

## 2018-08-09 NOTE — PROGRESS NOTES
Patient received from ENDO. Patient has red patchy areas around face. A little itchy, but not bad. Dr. Manohar Nicole paged and new orders received. Will continue to monitor.

## 2018-08-09 NOTE — ED PROVIDER NOTES
Patient Seen in: [facility] Emergency Department    History   Patient presents with:  Fever (infectious)    Stated Complaint: fever     HPI    CHIEF COMPLAINT: Fevers, chills, body aches    HISTORY OF PRESENT ILLNESS: Patient is a 18-year-old male w • Bad breath    • Bloating    • Calculus of kidney    • Diabetes mellitus ([facility] [address]    • Diarrhea, unspecified    • Disorder of thyroid    • Dizziness    • Eye disease    • Fatigue    • Fever    • Flatulence/gas pain/belching    • Frequent urination    • Hig Exam  Vital signs and nursing notes reviewed   General Appearance: Patient is alert and oriented x4 in no acute distress.   Patient appears ill, but is afebrile  Eyes: pupils equal and round no pallor or injection, no sclera icterus  Respiratory: there are WITH PLATELET.   Procedure                               Abnormality         Status                     ---------                               -----------         ------                     CBC W/ DIFFERENTIAL[[id number]]          Abnormal            Final Reviewed: 8/6/2018          ICD-10-CM Noted POA    Hyperglycemia R73.9 8/8/2018 Yes    Hyponatremia E87.1 8/8/2018 Yes    Post-procedural fever R50.82 8/8/2018 Unknown

## 2018-08-09 NOTE — H&P
[facility] HOSPITALIST  History and Physical     Aletha Escalante Patient Status:  Observation    11/3/1940 MRN [id number]   [facility] 3NW-A Attending Alvarez Johnson MD   Hosp Day # 0 PCP Nga Coker MD     Chief Complaint: abd pain, fev SURGICAL HISTORY      Comment: cystoscopy  4/2/2016: OTHER SURGICAL HISTORY Left      Comment: Procedure: HIP HEMIARTHROPLASTY/ BIPOLAR;                 Surgeon: Joe Ayala MD;  Location: [address] [place] [address]               OR  No date: SKIN SURGERY  No date: TONSI atraumatic. Moist mucous membranes. EOM-I. PERRLA. Anicteric. Neck: No lymphadenopathy. No JVD. No carotid bruits. Respiratory: Clear to auscultation bilaterally. No wheezes. No rhonchi. Cardiovascular: S1, S2. Regular rate and rhythm.  No murmurs, rubs

## 2018-08-09 NOTE — ED PROVIDER NOTES
I reviewed that chart and discussed the case with the physician assistant. I have examined the patient and noted patient definitely will need to be admitted. Definitely feel gastroenterology will need to be consulted. Does have a fever post procedure.

## 2018-08-09 NOTE — PROGRESS NOTES
[facility] Pharmacy Note:  Renal Adjustment for levofloxacin [facility])    Andreia Wang is a 65year old male who has been prescribed levofloxacin (LEVAQUIN) 500 mg every 24 hrs.   CrCl is estimated creatinine clearance is 56.2 mL/min (based on SCr of 1.19 mg

## 2018-08-10 ENCOUNTER — OFFICE VISIT (OUTPATIENT)
Dept: HEMATOLOGY/ONCOLOGY | Facility: HOSPITAL | Age: 78
End: 2018-08-10
Attending: INTERNAL MEDICINE
Payer: MEDICARE

## 2018-08-10 VITALS
TEMPERATURE: 99 F | HEIGHT: 72.01 IN | DIASTOLIC BLOOD PRESSURE: 76 MMHG | HEART RATE: 69 BPM | WEIGHT: 214 LBS | RESPIRATION RATE: 18 BRPM | OXYGEN SATURATION: 93 % | SYSTOLIC BLOOD PRESSURE: 145 MMHG | BODY MASS INDEX: 28.99 KG/M2

## 2018-08-10 DIAGNOSIS — Z71.9 ENCOUNTER FOR HEALTH EDUCATION: ICD-10-CM

## 2018-08-10 DIAGNOSIS — C25.0 MALIGNANT NEOPLASM OF HEAD OF PANCREAS (HCC): Primary | ICD-10-CM

## 2018-08-10 PROCEDURE — 96413 CHEMO IV INFUSION 1 HR: CPT

## 2018-08-10 PROCEDURE — 96417 CHEMO IV INFUS EACH ADDL SEQ: CPT

## 2018-08-10 PROCEDURE — 96375 TX/PRO/DX INJ NEW DRUG ADDON: CPT

## 2018-08-10 PROCEDURE — 99215 OFFICE O/P EST HI 40 MIN: CPT | Performed by: CLINICAL NURSE SPECIALIST

## 2018-08-10 RX ORDER — LORAZEPAM 0.5 MG/1
TABLET ORAL EVERY 4 HOURS PRN
Status: CANCELLED | OUTPATIENT
Start: 2018-08-10

## 2018-08-10 RX ORDER — METOCLOPRAMIDE HYDROCHLORIDE 5 MG/ML
10 INJECTION INTRAMUSCULAR; INTRAVENOUS EVERY 6 HOURS PRN
Status: CANCELLED | OUTPATIENT
Start: 2018-08-10

## 2018-08-10 RX ORDER — PROCHLORPERAZINE MALEATE 10 MG
10 TABLET ORAL EVERY 6 HOURS PRN
Status: CANCELLED | OUTPATIENT
Start: 2018-08-10

## 2018-08-10 RX ORDER — ONDANSETRON 2 MG/ML
8 INJECTION INTRAMUSCULAR; INTRAVENOUS EVERY 6 HOURS PRN
Status: CANCELLED | OUTPATIENT
Start: 2018-08-10

## 2018-08-10 RX ORDER — LORAZEPAM 2 MG/ML
INJECTION INTRAMUSCULAR EVERY 4 HOURS PRN
Status: CANCELLED | OUTPATIENT
Start: 2018-08-10

## 2018-08-10 NOTE — PROGRESS NOTES
Chemotherapy Education    Learner:  Patient and Family Member    Barriers / Limitations:  None    Chemotherapy education goals:  · Learn the drug names  · Administration schedule  · Routes of administration  · Treatment setting    Drug names:  Gemcitabine, N/A    Notify MD/RN of any changes or problems:  Achieved    Comments:    Treatment Effects on Emotional Status:    Potential mood changes, depression, nervousness, difficulty sleeping:  Achieved    Importance of support system:  Achieved    Notify MD/RN o

## 2018-08-10 NOTE — PROGRESS NOTES
PT RESTING IN BED, DAUGHTER AT BEDSIDE. PT HAS EASY NON LABORED BREATHING ON RA. IV FLUIDS INFUSING WITHOUT DIFFICULTY. VS WNL. VOIDS ADEQUATE AMOUNT. DENIES ANY PAIN AT PRESENT TIME. PLAN OF CARE DISCUSSED. ALL QUESTIONS ANSWERED.  INSTRUCTED TO CALL IF AN

## 2018-08-10 NOTE — PROGRESS NOTES
[facility] Chemo Education Overview    Learner:  Patient and Family Member    Learner's Barriers / Limitations of Education:  None    Names of Drugs:      Chemotherapy: Abraxane and Gemzar           Anti-Nausea:    Prochlorperazine (Compazine) 10

## 2018-08-10 NOTE — PROGRESS NOTES
CALLED DR ARREDONDO IN REGARDS TO PT'S ACCHECK 354. LEFT MESSAGE ON PERFECT SERVE. RECEIVED CALL BACK FROM DR Kay Becerril, RECEIVED ORDERS WILL EXECUTE.

## 2018-08-10 NOTE — PROGRESS NOTES
DISCHARGE INSTRUCTION DISCUSSED WITH PT AND PT'S DAUGHTER. ALL QUESTIONS ANSWERED. PAPERWORK GIVEN TO PT'S DAUGHTER, PT DISCHARGED AND LEFT UNIT VIA TRANSPORT STABLE CONDITION.

## 2018-08-13 ENCOUNTER — TELEPHONE (OUTPATIENT)
Dept: HEMATOLOGY/ONCOLOGY | Facility: HOSPITAL | Age: 78
End: 2018-08-13

## 2018-08-13 ENCOUNTER — DIETICIAN VISIT (OUTPATIENT)
Dept: HEMATOLOGY/ONCOLOGY | Facility: HOSPITAL | Age: 78
End: 2018-08-13

## 2018-08-13 NOTE — TELEPHONE ENCOUNTER
Date of Treatment: 8/10/18                               Type of Chemo: Gemzar/Abraxane    Comments: LM for patient at listed number- identified as number for [facility]- contact.     Recommendations: Left general message for patient to call with any question

## 2018-08-13 NOTE — TELEPHONE ENCOUNTER
Spoke with Rakesh To (daughter), who states that pt was feeling well on Fri and Sat, but has been very fatigued since yesterday. Reports that pt is hydrating well and urinating frequently.  Denies any complaint of nausea or vomiting and has been eating small

## 2018-08-13 NOTE — PROGRESS NOTES
Nutrition screen complete as triggered by Best Practice dx of pancreatic cancer (head). Chart reviewed. RD will attempt to meet w/ during chemo infusion. Pt at a moderate nutrition risk as per dx.

## 2018-08-17 ENCOUNTER — TELEPHONE (OUTPATIENT)
Dept: HEMATOLOGY/ONCOLOGY | Facility: HOSPITAL | Age: 78
End: 2018-08-17

## 2018-08-17 ENCOUNTER — APPOINTMENT (OUTPATIENT)
Dept: HEMATOLOGY/ONCOLOGY | Facility: HOSPITAL | Age: 78
End: 2018-08-17
Attending: INTERNAL MEDICINE
Payer: MEDICARE

## 2018-08-17 DIAGNOSIS — C25.0 MALIGNANT NEOPLASM OF HEAD OF PANCREAS (HCC): Primary | ICD-10-CM

## 2018-08-17 NOTE — TELEPHONE ENCOUNTER
Patient's daughter Laurann Charleston called to report patient does not want to do chemotherapy anymore. Called to cancel today's appointment. Patient has been lethargic all week, nausea and urinary incontinence.  Patient has had decrease quality of life and doesn't

## 2018-08-17 NOTE — TELEPHONE ENCOUNTER
Left message with Roni Allen - understand he doesn't want to come in for treatment. Will need to establish further follow-up to make sure that he is watched for any developing complications.   Noemí Hinds MD

## 2018-08-20 ENCOUNTER — TELEPHONE (OUTPATIENT)
Dept: HEMATOLOGY/ONCOLOGY | Facility: HOSPITAL | Age: 78
End: 2018-08-20

## 2018-08-20 NOTE — TELEPHONE ENCOUNTER
----- Message from Kathy Bautista sent at 8/20/2018  4:39 PM CDT -----  Regarding: has a few questions about his care  Contact: [phone]  Margot José Antonio would like for you to call her back regarding her father.  Not urgent

## 2018-08-20 NOTE — TELEPHONE ENCOUNTER
Patient is trying to decide about treatment. Spoke with family over weekend. He was just overwhelmed, Asking if doses can be adjusted. He will schedule time to see Dr Alfonzo Coelho. To discuss.

## 2018-08-20 NOTE — PROGRESS NOTES
[facility]    Patients Name: Crispin John  Attending Physician: Arely att. providers found  CSN: [id number]    Location:  301/301-A  MRN: [id number]    YOB: 1940  Admission Date: 8/8/2018     Anesthesia Post-op Note    Procedure(s):  EN

## 2018-08-24 ENCOUNTER — OFFICE VISIT (OUTPATIENT)
Dept: HEMATOLOGY/ONCOLOGY | Facility: HOSPITAL | Age: 78
End: 2018-08-24
Attending: INTERNAL MEDICINE
Payer: MEDICARE

## 2018-08-24 VITALS
WEIGHT: 207 LBS | HEART RATE: 75 BPM | OXYGEN SATURATION: 94 % | HEIGHT: 72.01 IN | DIASTOLIC BLOOD PRESSURE: 80 MMHG | BODY MASS INDEX: 28.04 KG/M2 | TEMPERATURE: 98 F | RESPIRATION RATE: 18 BRPM | SYSTOLIC BLOOD PRESSURE: 145 MMHG

## 2018-08-24 DIAGNOSIS — Z77.22 DAILY EXPOSURE TO TOBACCO SMOKE: Primary | ICD-10-CM

## 2018-08-24 DIAGNOSIS — C25.0 MALIGNANT NEOPLASM OF HEAD OF PANCREAS (HCC): ICD-10-CM

## 2018-08-24 DIAGNOSIS — R73.9 HYPERGLYCEMIA: ICD-10-CM

## 2018-08-24 DIAGNOSIS — C25.0 MALIGNANT NEOPLASM OF HEAD OF PANCREAS (HCC): Primary | ICD-10-CM

## 2018-08-24 LAB
ALBUMIN SERPL-MCNC: 3.1 G/DL (ref 3.5–4.8)
ALBUMIN/GLOB SERPL: 0.6 {RATIO} (ref 1–2)
ALP LIVER SERPL-CCNC: 126 U/L (ref 45–117)
ALT SERPL-CCNC: 64 U/L (ref 17–63)
ANION GAP SERPL CALC-SCNC: 9 MMOL/L (ref 0–18)
AST SERPL-CCNC: 22 U/L (ref 15–41)
BASOPHILS # BLD AUTO: 0.04 X10(3) UL (ref 0–0.1)
BASOPHILS NFR BLD AUTO: 0.6 %
BILIRUB SERPL-MCNC: 0.4 MG/DL (ref 0.1–2)
BUN BLD-MCNC: 20 MG/DL (ref 8–20)
BUN/CREAT SERPL: 13.6 (ref 10–20)
CALCIUM BLD-MCNC: 9 MG/DL (ref 8.3–10.3)
CHLORIDE SERPL-SCNC: 99 MMOL/L (ref 101–111)
CO2 SERPL-SCNC: 23 MMOL/L (ref 22–32)
CREAT BLD-MCNC: 1.47 MG/DL (ref 0.7–1.3)
EOSINOPHIL # BLD AUTO: 0.22 X10(3) UL (ref 0–0.3)
EOSINOPHIL NFR BLD AUTO: 3.5 %
ERYTHROCYTE [DISTWIDTH] IN BLOOD BY AUTOMATED COUNT: 12.9 % (ref 11.5–16)
GLOBULIN PLAS-MCNC: 4.8 G/DL (ref 2.5–4)
GLUCOSE BLD-MCNC: 537 MG/DL (ref 70–99)
HCT VFR BLD AUTO: 46.5 % (ref 37–53)
HGB BLD-MCNC: 15.2 G/DL (ref 13–17)
IMMATURE GRANULOCYTE COUNT: 0.08 X10(3) UL (ref 0–1)
IMMATURE GRANULOCYTE RATIO %: 1.3 %
LYMPHOCYTES # BLD AUTO: 1.06 X10(3) UL (ref 0.9–4)
LYMPHOCYTES NFR BLD AUTO: 16.9 %
M PROTEIN MFR SERPL ELPH: 7.9 G/DL (ref 6.1–8.3)
MCH RBC QN AUTO: 31.7 PG (ref 27–33.2)
MCHC RBC AUTO-ENTMCNC: 32.7 G/DL (ref 31–37)
MCV RBC AUTO: 96.9 FL (ref 80–99)
MONOCYTES # BLD AUTO: 0.55 X10(3) UL (ref 0.1–1)
MONOCYTES NFR BLD AUTO: 8.8 %
NEUTROPHIL ABS PRELIM: 4.31 X10 (3) UL (ref 1.3–6.7)
NEUTROPHILS # BLD AUTO: 4.31 X10(3) UL (ref 1.3–6.7)
NEUTROPHILS NFR BLD AUTO: 68.9 %
OSMOLALITY SERPL CALC.SUM OF ELEC: 299 MOSM/KG (ref 275–295)
PLATELET # BLD AUTO: 298 10(3)UL (ref 150–450)
POTASSIUM SERPL-SCNC: 5.2 MMOL/L (ref 3.6–5.1)
RBC # BLD AUTO: 4.8 X10(6)UL (ref 3.8–5.8)
RED CELL DISTRIBUTION WIDTH-SD: 45.3 FL (ref 35.1–46.3)
SODIUM SERPL-SCNC: 131 MMOL/L (ref 136–144)
WBC # BLD AUTO: 6.3 X10(3) UL (ref 4–13)

## 2018-08-24 PROCEDURE — 96375 TX/PRO/DX INJ NEW DRUG ADDON: CPT

## 2018-08-24 PROCEDURE — 80053 COMPREHEN METABOLIC PANEL: CPT

## 2018-08-24 PROCEDURE — 96417 CHEMO IV INFUS EACH ADDL SEQ: CPT

## 2018-08-24 PROCEDURE — 96413 CHEMO IV INFUSION 1 HR: CPT

## 2018-08-24 PROCEDURE — 96372 THER/PROPH/DIAG INJ SC/IM: CPT

## 2018-08-24 PROCEDURE — 99214 OFFICE O/P EST MOD 30 MIN: CPT | Performed by: INTERNAL MEDICINE

## 2018-08-24 RX ORDER — PROCHLORPERAZINE MALEATE 10 MG
10 TABLET ORAL EVERY 6 HOURS PRN
Status: CANCELLED | OUTPATIENT
Start: 2018-08-24

## 2018-08-24 RX ORDER — TAMSULOSIN HYDROCHLORIDE 0.4 MG/1
0.4 CAPSULE ORAL DAILY
Qty: 30 CAPSULE | Refills: 0 | Status: SHIPPED | OUTPATIENT
Start: 2018-08-24 | End: 2018-09-20

## 2018-08-24 RX ORDER — METOCLOPRAMIDE HYDROCHLORIDE 5 MG/ML
10 INJECTION INTRAMUSCULAR; INTRAVENOUS EVERY 6 HOURS PRN
Status: CANCELLED | OUTPATIENT
Start: 2018-08-24

## 2018-08-24 RX ORDER — LORAZEPAM 2 MG/ML
INJECTION INTRAMUSCULAR EVERY 4 HOURS PRN
Status: CANCELLED | OUTPATIENT
Start: 2018-08-24

## 2018-08-24 RX ORDER — PEN NEEDLE, DIABETIC 31 G X1/4"
NEEDLE, DISPOSABLE MISCELLANEOUS
Refills: 0 | Status: ON HOLD | COMMUNITY
Start: 2018-08-21 | End: 2019-11-25

## 2018-08-24 RX ORDER — ALPRAZOLAM 0.5 MG/1
0.5 TABLET ORAL 2 TIMES DAILY PRN
Refills: 0 | Status: ON HOLD | COMMUNITY
Start: 2018-08-23 | End: 2019-12-10

## 2018-08-24 RX ORDER — LORAZEPAM 0.5 MG/1
TABLET ORAL EVERY 4 HOURS PRN
Status: CANCELLED | OUTPATIENT
Start: 2018-08-24

## 2018-08-24 RX ORDER — VENLAFAXINE HYDROCHLORIDE 75 MG/1
CAPSULE, EXTENDED RELEASE ORAL
Refills: 0 | COMMUNITY
Start: 2018-08-21 | End: 2019-07-05

## 2018-08-24 RX ORDER — ONDANSETRON 2 MG/ML
8 INJECTION INTRAMUSCULAR; INTRAVENOUS EVERY 6 HOURS PRN
Status: CANCELLED | OUTPATIENT
Start: 2018-08-24

## 2018-08-24 NOTE — PROGRESS NOTES
Pt here for C1D8.   Arrives Ambulating independently, accompanied by Family member           Modifications in dose or schedule: Yes dose reduced     Frequency of blood return and site check throughout administration: Prior to administration, Prior to each d

## 2018-08-24 NOTE — PROGRESS NOTES
Patient is here for MD f/katie and C1D8 of chemo. Patient did not tolerated first tx well. Patient has been very fatigued, SOB and weak. Patient c/o decrease appetite. Feeling very anxious. Started on Xanax last night.  Here with daughter to discuss chemo tx/

## 2018-08-27 ENCOUNTER — TELEPHONE (OUTPATIENT)
Dept: HEMATOLOGY/ONCOLOGY | Facility: HOSPITAL | Age: 78
End: 2018-08-27

## 2018-08-27 NOTE — TELEPHONE ENCOUNTER
Daughter, Manuel Alfonso, states pt started taking flomax over the weekend, but is requesting clarification regarding to need to discontinue lisinopril, verapamil or both. Reports that pt did not take lisinopril yesterday as his BP reading was 85/56.      Merritt georges

## 2018-08-27 NOTE — TELEPHONE ENCOUNTER
MD Ozzie Mcgraw, RN   Caller: Unspecified (Today,  9:53 AM)             Stay on Flomax.  Stay off lisinopril. Stay on verapamil      Irasema Torrez made aware and verbalized understanding.

## 2018-08-28 NOTE — PROGRESS NOTES
[facility]    PATIENT'S NAME: Zach De La Rosa   ATTENDING PHYSICIAN: Key Cooley M.D.    PATIENT ACCOUNT #: [de-identified] LOCATION: [address] [place] [address] RECORD #: [id number] YOB: 1940   DATE OF SERVICE: 08/24/2018       CANCER CE well-developed, well-nourished male. He is in no acute distress. VITAL SIGNS:  His performance status is 1. His weight is 207. His blood pressure is 145/80, pulse 75, respiratory rate is 20, temperature is 97.6. HEENT:  Unremarkable.   He has pink con

## 2018-08-31 ENCOUNTER — OFFICE VISIT (OUTPATIENT)
Dept: HEMATOLOGY/ONCOLOGY | Facility: HOSPITAL | Age: 78
End: 2018-08-31
Attending: INTERNAL MEDICINE
Payer: MEDICARE

## 2018-08-31 ENCOUNTER — SNF/IP PROF CHARGE ONLY (OUTPATIENT)
Dept: HEMATOLOGY/ONCOLOGY | Facility: HOSPITAL | Age: 78
End: 2018-08-31

## 2018-08-31 VITALS
SYSTOLIC BLOOD PRESSURE: 142 MMHG | BODY MASS INDEX: 28 KG/M2 | WEIGHT: 207.63 LBS | DIASTOLIC BLOOD PRESSURE: 77 MMHG | OXYGEN SATURATION: 93 % | RESPIRATION RATE: 18 BRPM | HEART RATE: 87 BPM | TEMPERATURE: 97 F

## 2018-08-31 DIAGNOSIS — C25.0 MALIGNANT NEOPLASM OF HEAD OF PANCREAS (HCC): Primary | ICD-10-CM

## 2018-08-31 DIAGNOSIS — C25.0 MALIGNANT NEOPLASM OF HEAD OF PANCREAS (HCC): ICD-10-CM

## 2018-08-31 LAB
ALBUMIN SERPL-MCNC: 2.8 G/DL (ref 3.5–4.8)
ALBUMIN/GLOB SERPL: 0.6 {RATIO} (ref 1–2)
ALP LIVER SERPL-CCNC: 102 U/L (ref 45–117)
ALT SERPL-CCNC: 103 U/L (ref 17–63)
ANION GAP SERPL CALC-SCNC: 8 MMOL/L (ref 0–18)
AST SERPL-CCNC: 47 U/L (ref 15–41)
BASOPHILS # BLD AUTO: 0.01 X10(3) UL (ref 0–0.1)
BASOPHILS NFR BLD AUTO: 0.3 %
BILIRUB SERPL-MCNC: 0.4 MG/DL (ref 0.1–2)
BUN BLD-MCNC: 20 MG/DL (ref 8–20)
BUN/CREAT SERPL: 14.9 (ref 10–20)
CALCIUM BLD-MCNC: 8.8 MG/DL (ref 8.3–10.3)
CHLORIDE SERPL-SCNC: 99 MMOL/L (ref 101–111)
CO2 SERPL-SCNC: 26 MMOL/L (ref 22–32)
CREAT BLD-MCNC: 1.34 MG/DL (ref 0.7–1.3)
EOSINOPHIL # BLD AUTO: 0.05 X10(3) UL (ref 0–0.3)
EOSINOPHIL NFR BLD AUTO: 1.5 %
ERYTHROCYTE [DISTWIDTH] IN BLOOD BY AUTOMATED COUNT: 12.7 % (ref 11.5–16)
GLOBULIN PLAS-MCNC: 4.7 G/DL (ref 2.5–4)
GLUCOSE BLD-MCNC: 359 MG/DL (ref 70–99)
HCT VFR BLD AUTO: 43.1 % (ref 37–53)
HGB BLD-MCNC: 14.1 G/DL (ref 13–17)
IMMATURE GRANULOCYTE COUNT: 0.02 X10(3) UL (ref 0–1)
IMMATURE GRANULOCYTE RATIO %: 0.6 %
LYMPHOCYTES # BLD AUTO: 1.26 X10(3) UL (ref 0.9–4)
LYMPHOCYTES NFR BLD AUTO: 37.3 %
M PROTEIN MFR SERPL ELPH: 7.5 G/DL (ref 6.1–8.3)
MCH RBC QN AUTO: 31.8 PG (ref 27–33.2)
MCHC RBC AUTO-ENTMCNC: 32.7 G/DL (ref 31–37)
MCV RBC AUTO: 97.1 FL (ref 80–99)
MONOCYTES # BLD AUTO: 0.3 X10(3) UL (ref 0.1–1)
MONOCYTES NFR BLD AUTO: 8.9 %
NEUTROPHIL ABS PRELIM: 1.74 X10 (3) UL (ref 1.3–6.7)
NEUTROPHILS # BLD AUTO: 1.74 X10(3) UL (ref 1.3–6.7)
NEUTROPHILS NFR BLD AUTO: 51.4 %
OSMOLALITY SERPL CALC.SUM OF ELEC: 293 MOSM/KG (ref 275–295)
PLATELET # BLD AUTO: 261 10(3)UL (ref 150–450)
POTASSIUM SERPL-SCNC: 4.9 MMOL/L (ref 3.6–5.1)
RBC # BLD AUTO: 4.44 X10(6)UL (ref 3.8–5.8)
RED CELL DISTRIBUTION WIDTH-SD: 44.6 FL (ref 35.1–46.3)
SODIUM SERPL-SCNC: 133 MMOL/L (ref 136–144)
WBC # BLD AUTO: 3.4 X10(3) UL (ref 4–13)

## 2018-08-31 PROCEDURE — 96413 CHEMO IV INFUSION 1 HR: CPT

## 2018-08-31 PROCEDURE — G9678 ONCOLOGY CARE MODEL SERVICE: HCPCS | Performed by: INTERNAL MEDICINE

## 2018-08-31 PROCEDURE — 80053 COMPREHEN METABOLIC PANEL: CPT

## 2018-08-31 PROCEDURE — 96375 TX/PRO/DX INJ NEW DRUG ADDON: CPT

## 2018-08-31 PROCEDURE — 96417 CHEMO IV INFUS EACH ADDL SEQ: CPT

## 2018-08-31 PROCEDURE — 85025 COMPLETE CBC W/AUTO DIFF WBC: CPT

## 2018-08-31 PROCEDURE — 36415 COLL VENOUS BLD VENIPUNCTURE: CPT

## 2018-08-31 NOTE — PROGRESS NOTES
Pt here for C1D15.   Arrives Ambulating independently, accompanied by Family member           Modifications in dose or schedule: No     Frequency of blood return and site check throughout administration: Prior to administration, Prior to each drug and At [facility]

## 2018-09-05 ENCOUNTER — TELEPHONE (OUTPATIENT)
Dept: HEMATOLOGY/ONCOLOGY | Facility: HOSPITAL | Age: 78
End: 2018-09-05

## 2018-09-05 NOTE — TELEPHONE ENCOUNTER
Received a call from patient's daughter Omero Corbin. Requesting an appointment to see Dr Lashay Russell this week to talk about all his symptoms. Refused to see the APN today or tomorrow. Prefer to see Dr Lashay Russell on Friday.  Patient has not felt well since Friday's matthew

## 2018-09-07 ENCOUNTER — OFFICE VISIT (OUTPATIENT)
Dept: HEMATOLOGY/ONCOLOGY | Facility: HOSPITAL | Age: 78
End: 2018-09-07
Attending: INTERNAL MEDICINE
Payer: MEDICARE

## 2018-09-07 VITALS
RESPIRATION RATE: 18 BRPM | DIASTOLIC BLOOD PRESSURE: 70 MMHG | HEART RATE: 104 BPM | BODY MASS INDEX: 28.26 KG/M2 | TEMPERATURE: 97 F | OXYGEN SATURATION: 97 % | WEIGHT: 208.63 LBS | HEIGHT: 72.01 IN | SYSTOLIC BLOOD PRESSURE: 145 MMHG

## 2018-09-07 DIAGNOSIS — R73.9 HYPERGLYCEMIA: ICD-10-CM

## 2018-09-07 DIAGNOSIS — C25.0 MALIGNANT NEOPLASM OF HEAD OF PANCREAS (HCC): Primary | ICD-10-CM

## 2018-09-07 PROCEDURE — 99214 OFFICE O/P EST MOD 30 MIN: CPT | Performed by: INTERNAL MEDICINE

## 2018-09-07 RX ORDER — LEVOFLOXACIN 500 MG/1
500 TABLET, FILM COATED ORAL DAILY
Qty: 7 TABLET | Refills: 3 | Status: SHIPPED | OUTPATIENT
Start: 2018-09-07 | End: 2018-11-05

## 2018-09-07 RX ORDER — INSULIN LISPRO 100 [IU]/ML
25 INJECTION, SOLUTION INTRAVENOUS; SUBCUTANEOUS
Refills: 0 | Status: ON HOLD | COMMUNITY
Start: 2018-08-27 | End: 2019-12-08

## 2018-09-09 PROBLEM — R53.83 FATIGUE DUE TO TREATMENT: Status: ACTIVE | Noted: 2018-09-09

## 2018-09-10 NOTE — PROGRESS NOTES
[facility]    PATIENT'S NAME: Scott Cooper   ATTENDING PHYSICIAN: Cecile Corea M.D.    PATIENT ACCOUNT #: [de-identified] LOCATION: [address] [place] [address] RECORD #: [id number] YOB: 1940   DATE OF SERVICE: 09/07/2018       CANCER CE variable doses with ongoing changes, levothyroxine 50 mcg daily, ondansetron 8 mg q.8 h. p.r.n., tamsulosin 0.4 mg p.r.n., venlafaxine 75 mg daily, verapamil 100 mg nightly, and a prescription for levofloxacin has been given to him in the event of fever as cannot really use this as an assessment of response. They understand and agree. Dictated By Cecile Corea M.D.  d: 09/09/2018 07:21:23  t: 09/09/2018 23:35:41  [facility] [id number]  /    cc: FLACO Keith Junior, D.O. R

## 2018-09-11 ENCOUNTER — TELEPHONE (OUTPATIENT)
Dept: HEMATOLOGY/ONCOLOGY | Facility: HOSPITAL | Age: 78
End: 2018-09-11

## 2018-09-11 NOTE — TELEPHONE ENCOUNTER
Daughter called asking for return call. Called daughter back, no answer. Left VM for daughter to call back with question.

## 2018-09-14 ENCOUNTER — APPOINTMENT (OUTPATIENT)
Dept: HEMATOLOGY/ONCOLOGY | Facility: HOSPITAL | Age: 78
End: 2018-09-14
Attending: INTERNAL MEDICINE
Payer: MEDICARE

## 2018-09-17 ENCOUNTER — OFFICE VISIT (OUTPATIENT)
Dept: HEMATOLOGY/ONCOLOGY | Facility: HOSPITAL | Age: 78
End: 2018-09-17
Attending: INTERNAL MEDICINE
Payer: MEDICARE

## 2018-09-17 VITALS
SYSTOLIC BLOOD PRESSURE: 130 MMHG | HEART RATE: 69 BPM | WEIGHT: 214.5 LBS | TEMPERATURE: 99 F | DIASTOLIC BLOOD PRESSURE: 80 MMHG | OXYGEN SATURATION: 97 % | BODY MASS INDEX: 29 KG/M2

## 2018-09-17 DIAGNOSIS — R79.89 ELEVATED LFTS: ICD-10-CM

## 2018-09-17 DIAGNOSIS — C25.0 MALIGNANT NEOPLASM OF HEAD OF PANCREAS (HCC): Primary | ICD-10-CM

## 2018-09-17 LAB
ALBUMIN SERPL-MCNC: 2.7 G/DL (ref 3.5–4.8)
ALBUMIN/GLOB SERPL: 0.6 {RATIO} (ref 1–2)
ALP LIVER SERPL-CCNC: 349 U/L (ref 45–117)
ALT SERPL-CCNC: 202 U/L (ref 17–63)
ANION GAP SERPL CALC-SCNC: 7 MMOL/L (ref 0–18)
AST SERPL-CCNC: 76 U/L (ref 15–41)
BASOPHILS # BLD AUTO: 0.12 X10(3) UL (ref 0–0.1)
BASOPHILS NFR BLD AUTO: 1.6 %
BILIRUB SERPL-MCNC: 0.5 MG/DL (ref 0.1–2)
BUN BLD-MCNC: 14 MG/DL (ref 8–20)
BUN/CREAT SERPL: 10.1 (ref 10–20)
CALCIUM BLD-MCNC: 9 MG/DL (ref 8.3–10.3)
CANCER AG19-9 SERPL-ACNC: 53.9 U/ML (ref ?–37)
CHLORIDE SERPL-SCNC: 104 MMOL/L (ref 101–111)
CO2 SERPL-SCNC: 25 MMOL/L (ref 22–32)
CREAT BLD-MCNC: 1.39 MG/DL (ref 0.7–1.3)
EOSINOPHIL # BLD AUTO: 0.64 X10(3) UL (ref 0–0.3)
EOSINOPHIL NFR BLD AUTO: 8.4 %
ERYTHROCYTE [DISTWIDTH] IN BLOOD BY AUTOMATED COUNT: 14.9 % (ref 11.5–16)
GLOBULIN PLAS-MCNC: 4.7 G/DL (ref 2.5–4)
GLUCOSE BLD-MCNC: 245 MG/DL (ref 70–99)
HCT VFR BLD AUTO: 41.6 % (ref 37–53)
HGB BLD-MCNC: 13.6 G/DL (ref 13–17)
IMMATURE GRANULOCYTE COUNT: 0.18 X10(3) UL (ref 0–1)
IMMATURE GRANULOCYTE RATIO %: 2.3 %
LYMPHOCYTES # BLD AUTO: 1.54 X10(3) UL (ref 0.9–4)
LYMPHOCYTES NFR BLD AUTO: 20.1 %
M PROTEIN MFR SERPL ELPH: 7.4 G/DL (ref 6.1–8.3)
MCH RBC QN AUTO: 32.3 PG (ref 27–33.2)
MCHC RBC AUTO-ENTMCNC: 32.7 G/DL (ref 31–37)
MCV RBC AUTO: 98.8 FL (ref 80–99)
MONOCYTES # BLD AUTO: 1.16 X10(3) UL (ref 0.1–1)
MONOCYTES NFR BLD AUTO: 15.1 %
NEUTROPHIL ABS PRELIM: 4.02 X10 (3) UL (ref 1.3–6.7)
NEUTROPHILS # BLD AUTO: 4.02 X10(3) UL (ref 1.3–6.7)
NEUTROPHILS NFR BLD AUTO: 52.5 %
OSMOLALITY SERPL CALC.SUM OF ELEC: 291 MOSM/KG (ref 275–295)
PLATELET # BLD AUTO: 456 10(3)UL (ref 150–450)
POTASSIUM SERPL-SCNC: 4.6 MMOL/L (ref 3.6–5.1)
RBC # BLD AUTO: 4.21 X10(6)UL (ref 3.8–5.8)
RED CELL DISTRIBUTION WIDTH-SD: 53.1 FL (ref 35.1–46.3)
SODIUM SERPL-SCNC: 136 MMOL/L (ref 136–144)
WBC # BLD AUTO: 7.7 X10(3) UL (ref 4–13)

## 2018-09-17 PROCEDURE — 86301 IMMUNOASSAY TUMOR CA 19-9: CPT

## 2018-09-17 PROCEDURE — 36415 COLL VENOUS BLD VENIPUNCTURE: CPT

## 2018-09-17 PROCEDURE — 96375 TX/PRO/DX INJ NEW DRUG ADDON: CPT

## 2018-09-17 PROCEDURE — 80053 COMPREHEN METABOLIC PANEL: CPT

## 2018-09-17 PROCEDURE — 96413 CHEMO IV INFUSION 1 HR: CPT

## 2018-09-17 PROCEDURE — 85025 COMPLETE CBC W/AUTO DIFF WBC: CPT

## 2018-09-17 PROCEDURE — 96417 CHEMO IV INFUS EACH ADDL SEQ: CPT

## 2018-09-17 RX ORDER — LORAZEPAM 0.5 MG/1
TABLET ORAL EVERY 4 HOURS PRN
Status: CANCELLED | OUTPATIENT
Start: 2018-09-24

## 2018-09-17 RX ORDER — PROCHLORPERAZINE MALEATE 10 MG
10 TABLET ORAL EVERY 6 HOURS PRN
Status: CANCELLED | OUTPATIENT
Start: 2018-09-24

## 2018-09-17 RX ORDER — ONDANSETRON 2 MG/ML
8 INJECTION INTRAMUSCULAR; INTRAVENOUS EVERY 6 HOURS PRN
Status: CANCELLED | OUTPATIENT
Start: 2018-10-01

## 2018-09-17 RX ORDER — LORAZEPAM 2 MG/ML
INJECTION INTRAMUSCULAR EVERY 4 HOURS PRN
Status: CANCELLED | OUTPATIENT
Start: 2018-09-24

## 2018-09-17 RX ORDER — LORAZEPAM 2 MG/ML
INJECTION INTRAMUSCULAR EVERY 4 HOURS PRN
Status: CANCELLED | OUTPATIENT
Start: 2018-09-17

## 2018-09-17 RX ORDER — METOCLOPRAMIDE HYDROCHLORIDE 5 MG/ML
10 INJECTION INTRAMUSCULAR; INTRAVENOUS EVERY 6 HOURS PRN
Status: CANCELLED | OUTPATIENT
Start: 2018-09-24

## 2018-09-17 RX ORDER — METOCLOPRAMIDE HYDROCHLORIDE 5 MG/ML
10 INJECTION INTRAMUSCULAR; INTRAVENOUS EVERY 6 HOURS PRN
Status: CANCELLED | OUTPATIENT
Start: 2018-10-01

## 2018-09-17 RX ORDER — LORAZEPAM 0.5 MG/1
TABLET ORAL EVERY 4 HOURS PRN
Status: CANCELLED | OUTPATIENT
Start: 2018-10-01

## 2018-09-17 RX ORDER — PROCHLORPERAZINE MALEATE 10 MG
10 TABLET ORAL EVERY 6 HOURS PRN
Status: CANCELLED | OUTPATIENT
Start: 2018-09-17

## 2018-09-17 RX ORDER — ONDANSETRON 2 MG/ML
8 INJECTION INTRAMUSCULAR; INTRAVENOUS EVERY 6 HOURS PRN
Status: CANCELLED | OUTPATIENT
Start: 2018-09-24

## 2018-09-17 RX ORDER — LORAZEPAM 2 MG/ML
INJECTION INTRAMUSCULAR EVERY 4 HOURS PRN
Status: CANCELLED | OUTPATIENT
Start: 2018-10-01

## 2018-09-17 RX ORDER — PROCHLORPERAZINE MALEATE 10 MG
10 TABLET ORAL EVERY 6 HOURS PRN
Status: CANCELLED | OUTPATIENT
Start: 2018-10-01

## 2018-09-17 RX ORDER — LORAZEPAM 0.5 MG/1
TABLET ORAL EVERY 4 HOURS PRN
Status: CANCELLED | OUTPATIENT
Start: 2018-09-17

## 2018-09-17 RX ORDER — ONDANSETRON 2 MG/ML
8 INJECTION INTRAMUSCULAR; INTRAVENOUS EVERY 6 HOURS PRN
Status: CANCELLED | OUTPATIENT
Start: 2018-09-17

## 2018-09-17 RX ORDER — METOCLOPRAMIDE HYDROCHLORIDE 5 MG/ML
10 INJECTION INTRAMUSCULAR; INTRAVENOUS EVERY 6 HOURS PRN
Status: CANCELLED | OUTPATIENT
Start: 2018-09-17

## 2018-09-17 NOTE — PROGRESS NOTES
Pt here for C2D1 Abraxane/Gemzar for pancreatic ca. Patient's liver enzymes elevated more than before; Tommie Guevara APN aware. States patient should come in for peripheral lab on Monday to recheck. Appointment made, AVS printed and provided to patient.     Bashir Quach

## 2018-09-20 ENCOUNTER — NURSE ONLY (OUTPATIENT)
Dept: HEMATOLOGY/ONCOLOGY | Facility: HOSPITAL | Age: 78
End: 2018-09-20
Attending: INTERNAL MEDICINE
Payer: MEDICARE

## 2018-09-20 DIAGNOSIS — C25.0 MALIGNANT NEOPLASM OF HEAD OF PANCREAS (HCC): ICD-10-CM

## 2018-09-20 DIAGNOSIS — R73.9 HYPERGLYCEMIA: ICD-10-CM

## 2018-09-20 DIAGNOSIS — R79.89 ELEVATED LFTS: ICD-10-CM

## 2018-09-20 DIAGNOSIS — Z77.22 DAILY EXPOSURE TO TOBACCO SMOKE: ICD-10-CM

## 2018-09-20 LAB
ALBUMIN SERPL-MCNC: 2.8 G/DL (ref 3.5–4.8)
ALBUMIN/GLOB SERPL: 0.6 {RATIO} (ref 1–2)
ALP LIVER SERPL-CCNC: 224 U/L (ref 45–117)
ALT SERPL-CCNC: 268 U/L (ref 17–63)
ANION GAP SERPL CALC-SCNC: 7 MMOL/L (ref 0–18)
AST SERPL-CCNC: 179 U/L (ref 15–41)
BILIRUB SERPL-MCNC: 0.9 MG/DL (ref 0.1–2)
BUN BLD-MCNC: 20 MG/DL (ref 8–20)
BUN/CREAT SERPL: 15.6 (ref 10–20)
CALCIUM BLD-MCNC: 8.6 MG/DL (ref 8.3–10.3)
CHLORIDE SERPL-SCNC: 102 MMOL/L (ref 101–111)
CO2 SERPL-SCNC: 27 MMOL/L (ref 22–32)
CREAT BLD-MCNC: 1.28 MG/DL (ref 0.7–1.3)
GLOBULIN PLAS-MCNC: 4.6 G/DL (ref 2.5–4)
GLUCOSE BLD-MCNC: 178 MG/DL (ref 70–99)
M PROTEIN MFR SERPL ELPH: 7.4 G/DL (ref 6.1–8.3)
OSMOLALITY SERPL CALC.SUM OF ELEC: 289 MOSM/KG (ref 275–295)
POTASSIUM SERPL-SCNC: 4.9 MMOL/L (ref 3.6–5.1)
SODIUM SERPL-SCNC: 136 MMOL/L (ref 136–144)

## 2018-09-20 PROCEDURE — 80053 COMPREHEN METABOLIC PANEL: CPT

## 2018-09-20 PROCEDURE — 36415 COLL VENOUS BLD VENIPUNCTURE: CPT

## 2018-09-20 RX ORDER — TAMSULOSIN HYDROCHLORIDE 0.4 MG/1
CAPSULE ORAL
Qty: 30 CAPSULE | Refills: 5 | Status: SHIPPED | OUTPATIENT
Start: 2018-09-20 | End: 2019-01-15

## 2018-09-24 ENCOUNTER — APPOINTMENT (OUTPATIENT)
Dept: HEMATOLOGY/ONCOLOGY | Facility: HOSPITAL | Age: 78
End: 2018-09-24
Attending: INTERNAL MEDICINE
Payer: MEDICARE

## 2018-09-24 ENCOUNTER — TELEPHONE (OUTPATIENT)
Dept: HEMATOLOGY/ONCOLOGY | Facility: HOSPITAL | Age: 78
End: 2018-09-24

## 2018-09-24 DIAGNOSIS — C25.0 MALIGNANT NEOPLASM OF HEAD OF PANCREAS (HCC): Primary | ICD-10-CM

## 2018-09-24 DIAGNOSIS — C25.0 MALIGNANT NEOPLASM OF HEAD OF PANCREAS (HCC): ICD-10-CM

## 2018-09-24 LAB
ALBUMIN SERPL-MCNC: 2.5 G/DL (ref 3.5–4.8)
ALBUMIN/GLOB SERPL: 0.5 {RATIO} (ref 1–2)
ALP LIVER SERPL-CCNC: 137 U/L (ref 45–117)
ALT SERPL-CCNC: 82 U/L (ref 17–63)
ANION GAP SERPL CALC-SCNC: 6 MMOL/L (ref 0–18)
AST SERPL-CCNC: 23 U/L (ref 15–41)
BILIRUB SERPL-MCNC: 0.4 MG/DL (ref 0.1–2)
BUN BLD-MCNC: 15 MG/DL (ref 8–20)
BUN/CREAT SERPL: 12.2 (ref 10–20)
CALCIUM BLD-MCNC: 8.7 MG/DL (ref 8.3–10.3)
CHLORIDE SERPL-SCNC: 104 MMOL/L (ref 101–111)
CO2 SERPL-SCNC: 26 MMOL/L (ref 22–32)
CREAT BLD-MCNC: 1.23 MG/DL (ref 0.7–1.3)
GLOBULIN PLAS-MCNC: 4.9 G/DL (ref 2.5–4)
GLUCOSE BLD-MCNC: 198 MG/DL (ref 70–99)
M PROTEIN MFR SERPL ELPH: 7.4 G/DL (ref 6.1–8.3)
OSMOLALITY SERPL CALC.SUM OF ELEC: 288 MOSM/KG (ref 275–295)
POTASSIUM SERPL-SCNC: 4.4 MMOL/L (ref 3.6–5.1)
SODIUM SERPL-SCNC: 136 MMOL/L (ref 136–144)

## 2018-09-24 PROCEDURE — 36415 COLL VENOUS BLD VENIPUNCTURE: CPT

## 2018-09-24 PROCEDURE — 80053 COMPREHEN METABOLIC PANEL: CPT

## 2018-09-24 NOTE — TELEPHONE ENCOUNTER
Spoke to Dr Kelsey Ruby over the weekend and to Mayra Bruner (daughter ) this am.  He has had a rough week since last week's treatment. LFT\"s have been up though he has not been jaundiced.   Plan CT in the next day or two and if need be consider stent exchan

## 2018-09-25 ENCOUNTER — HOSPITAL ENCOUNTER (OUTPATIENT)
Dept: CT IMAGING | Facility: HOSPITAL | Age: 78
Discharge: HOME | End: 2018-09-25
Attending: INTERNAL MEDICINE
Payer: MEDICARE

## 2018-09-25 DIAGNOSIS — C25.0 MALIGNANT NEOPLASM OF HEAD OF PANCREAS (HCC): ICD-10-CM

## 2018-09-25 PROCEDURE — 71260 CT THORAX DX C+: CPT | Performed by: INTERNAL MEDICINE

## 2018-09-25 PROCEDURE — 74177 CT ABD & PELVIS W/CONTRAST: CPT | Performed by: INTERNAL MEDICINE

## 2018-09-28 ENCOUNTER — APPOINTMENT (OUTPATIENT)
Dept: HEMATOLOGY/ONCOLOGY | Facility: HOSPITAL | Age: 78
End: 2018-09-28
Attending: INTERNAL MEDICINE
Payer: MEDICARE

## 2018-09-30 ENCOUNTER — SNF/IP PROF CHARGE ONLY (OUTPATIENT)
Dept: HEMATOLOGY/ONCOLOGY | Facility: HOSPITAL | Age: 78
End: 2018-09-30

## 2018-09-30 DIAGNOSIS — C25.0 MALIGNANT NEOPLASM OF HEAD OF PANCREAS (HCC): ICD-10-CM

## 2018-09-30 PROCEDURE — G9678 ONCOLOGY CARE MODEL SERVICE: HCPCS | Performed by: INTERNAL MEDICINE

## 2018-10-01 ENCOUNTER — APPOINTMENT (OUTPATIENT)
Dept: RADIATION ONCOLOGY | Facility: HOSPITAL | Age: 78
End: 2018-10-01
Attending: RADIOLOGY
Payer: MEDICARE

## 2018-10-02 ENCOUNTER — OFFICE VISIT (OUTPATIENT)
Dept: HEMATOLOGY/ONCOLOGY | Facility: HOSPITAL | Age: 78
End: 2018-10-02
Attending: INTERNAL MEDICINE
Payer: MEDICARE

## 2018-10-02 VITALS
HEART RATE: 86 BPM | BODY MASS INDEX: 29.04 KG/M2 | SYSTOLIC BLOOD PRESSURE: 146 MMHG | RESPIRATION RATE: 18 BRPM | TEMPERATURE: 98 F | OXYGEN SATURATION: 94 % | HEIGHT: 72.01 IN | DIASTOLIC BLOOD PRESSURE: 79 MMHG | WEIGHT: 214.38 LBS

## 2018-10-02 DIAGNOSIS — C25.0 MALIGNANT NEOPLASM OF HEAD OF PANCREAS (HCC): Primary | ICD-10-CM

## 2018-10-02 PROCEDURE — 99214 OFFICE O/P EST MOD 30 MIN: CPT | Performed by: INTERNAL MEDICINE

## 2018-10-02 NOTE — PROGRESS NOTES
Patient is here for MD f/u for pancreatic cancer. Patient received C2D1 of Gemzar and Abraxane on 9/17. Patient was very ill that week and did not get out of bed. Patient had a Ct scan on 9/25. Appetite is better this week now that taste has improved.  Shari

## 2018-10-05 RX ORDER — CHLORAL HYDRATE 500 MG
1000 CAPSULE ORAL DAILY
COMMUNITY
End: 2018-11-27

## 2018-10-05 RX ORDER — ACETAMINOPHEN 500 MG
650 TABLET ORAL EVERY 6 HOURS PRN
COMMUNITY
End: 2019-10-25

## 2018-10-09 ENCOUNTER — HOSPITAL ENCOUNTER (OUTPATIENT)
Facility: HOSPITAL | Age: 78
Setting detail: HOSPITAL OUTPATIENT SURGERY
Discharge: HOME | End: 2018-10-09
Attending: INTERNAL MEDICINE | Admitting: INTERNAL MEDICINE
Payer: MEDICARE

## 2018-10-09 ENCOUNTER — ANESTHESIA (OUTPATIENT)
Dept: ENDOSCOPY | Facility: HOSPITAL | Age: 78
End: 2018-10-09
Payer: MEDICARE

## 2018-10-09 ENCOUNTER — APPOINTMENT (OUTPATIENT)
Dept: GENERAL RADIOLOGY | Facility: HOSPITAL | Age: 78
End: 2018-10-09
Attending: INTERNAL MEDICINE
Payer: MEDICARE

## 2018-10-09 ENCOUNTER — ANESTHESIA EVENT (OUTPATIENT)
Dept: ENDOSCOPY | Facility: HOSPITAL | Age: 78
End: 2018-10-09
Payer: MEDICARE

## 2018-10-09 VITALS
OXYGEN SATURATION: 95 % | SYSTOLIC BLOOD PRESSURE: 172 MMHG | HEART RATE: 59 BPM | RESPIRATION RATE: 18 BRPM | WEIGHT: 217 LBS | HEIGHT: 72 IN | DIASTOLIC BLOOD PRESSURE: 78 MMHG | BODY MASS INDEX: 29.39 KG/M2 | TEMPERATURE: 98 F

## 2018-10-09 DIAGNOSIS — C25.9 MALIGNANT NEOPLASM OF PANCREAS, UNSPECIFIED LOCATION OF MALIGNANCY (HCC): ICD-10-CM

## 2018-10-09 PROCEDURE — 0FPB8DZ REMOVAL OF INTRALUMINAL DEVICE FROM HEPATOBILIARY DUCT, VIA NATURAL OR ARTIFICIAL OPENING ENDOSCOPIC: ICD-10-PCS | Performed by: INTERNAL MEDICINE

## 2018-10-09 PROCEDURE — 82962 GLUCOSE BLOOD TEST: CPT

## 2018-10-09 PROCEDURE — 0F798DZ DILATION OF COMMON BILE DUCT WITH INTRALUMINAL DEVICE, VIA NATURAL OR ARTIFICIAL OPENING ENDOSCOPIC: ICD-10-PCS | Performed by: INTERNAL MEDICINE

## 2018-10-09 PROCEDURE — 0FC98ZZ EXTIRPATION OF MATTER FROM COMMON BILE DUCT, VIA NATURAL OR ARTIFICIAL OPENING ENDOSCOPIC: ICD-10-PCS | Performed by: INTERNAL MEDICINE

## 2018-10-09 PROCEDURE — 74330 X-RAY BILE/PANC ENDOSCOPY: CPT | Performed by: INTERNAL MEDICINE

## 2018-10-09 DEVICE — STENT SYSTEM
Type: IMPLANTABLE DEVICE | Site: BILIARY | Status: FUNCTIONAL
Brand: WALLFLEX™ BILIARY

## 2018-10-09 RX ORDER — NALOXONE HYDROCHLORIDE 0.4 MG/ML
80 INJECTION, SOLUTION INTRAMUSCULAR; INTRAVENOUS; SUBCUTANEOUS AS NEEDED
Status: DISCONTINUED | OUTPATIENT
Start: 2018-10-09 | End: 2018-10-09

## 2018-10-09 RX ORDER — SODIUM CHLORIDE, SODIUM LACTATE, POTASSIUM CHLORIDE, CALCIUM CHLORIDE 600; 310; 30; 20 MG/100ML; MG/100ML; MG/100ML; MG/100ML
INJECTION, SOLUTION INTRAVENOUS CONTINUOUS
Status: DISCONTINUED | OUTPATIENT
Start: 2018-10-09 | End: 2018-10-09

## 2018-10-09 RX ORDER — DEXTROSE MONOHYDRATE 25 G/50ML
50 INJECTION, SOLUTION INTRAVENOUS
Status: DISCONTINUED | OUTPATIENT
Start: 2018-10-09 | End: 2018-10-09

## 2018-10-09 NOTE — OPERATIVE REPORT
Irvin Mohs Patient Status:  Hospital Outpatient Surgery    11/3/1940 MRN [id number]   [facility] ENDOSCOPY Attending Nimesh Infante MD   Hosp Day # 0 PCP Nadeen Mercado MD     PREOPERATIVE DIAGNOSIS/INDICATION: Locally advanced extraction balloon, deep cannulation was performed with the help of a 0.035 straight Hydra jagwire 260cm in length.  Biliary balloon stone extraction was performed the help of [employer]’s 9-12mm triple lumen stone extraction balloon, moderate amount

## 2018-10-09 NOTE — PROGRESS NOTES
[facility]    PATIENT'S NAME: Kiran Ocampo   ATTENDING PHYSICIAN: Briseida Mccartney M.D.    PATIENT ACCOUNT #: [de-identified] LOCATION: [address] [place] [address] RECORD #: [id number] YOB: 1940   DATE OF SERVICE: 10/02/2018       CANCER CE SIGNS:  His performance status is 1. His weight is 214 pounds. Blood pressure is 146/79, pulse 86, respiratory rate is 20, temperature is 98. 3. HEENT:  Unremarkable. He has pink conjunctivae, anicteric sclerae. Pharynx without lesions.     LYMPHATICS: with at least equal survivals compared to historical control. While this is unlikely to ever be considered a cure, it is certainly a palliative intervention that may delay eventual systemic progression.   Given his poor results so far with the more tolerab

## 2018-10-09 NOTE — H&P
[address] [facility] Patient Status:  Hospital Outpatient Surgery    11/3/1940 MRN [id number]   [facility] ENDOSCOPY Attending Renata Brown MD   Hosp Day # 0 PCP Dara Tovar MD     CC: Advanced pancr 4/2/2016    Performed by Celeste Santiago MD at [address]   • OTHER SURGICAL HISTORY  10 years ago    cystoscopy   • OTHER SURGICAL HISTORY Left 4/2/2016    Procedure: HIP HEMIARTHROPLASTY/ BIPOLAR;  Surgeon: Celeste Santiago MD;  Location: [facility] MAIN OR   • S Orthopedic aftercare     Tear of left rotator cuff     Osteoarthrosis, localized, primary, knee, left     Nonspecific elevation of levels of transaminase or lactic acid dehydrogenase (LDH)     Pancreatic mass     Malignant neoplasm of head of pancreas ([address]

## 2018-10-09 NOTE — ANESTHESIA PREPROCEDURE EVALUATION
PRE-OP EVALUATION    Patient Name: Andreia Wang    Pre-op Diagnosis: Malignant neoplasm of pancreas, unspecified location of malignancy ([facility] [C25.9]    Procedure(s):  ENDOSCOPIC ULTRASOUND WITH FIDUCIAL PLACEMENT AND ENDOSCOPIC RETROGRADE [place] 100 UNIT/ML Subcutaneous Solution Pen-injector Inject 55 Units into the skin every morning. Disp:  Rfl: 0   Levothyroxine Sodium 50 MCG Oral Tab Take 50 mcg by mouth before breakfast. Disp:  Rfl:    Cetirizine HCl (ZYRTEC OR) Take 10 mg by mouth daily. Date    WBC 5.3 10/02/2018    RBC 4.21 10/02/2018    HGB 13.2 10/02/2018    HCT 42.0 10/02/2018    MCV 99.8 (H) 10/02/2018    MCH 31.4 10/02/2018    MCHC 31.4 10/02/2018    RDW 15.1 10/02/2018    .0 10/02/2018     Lab Results   Component Value Date

## 2018-10-09 NOTE — OPERATIVE REPORT
Avery Gutiérrez Patient Status:  Hospital Outpatient Surgery    11/3/1940 MRN [id number]   [facility] ENDOSCOPY Attending Alice Srinivasan MD   Hosp Day # 0 PCP Hiwot Brown MD     PREOPERATIVE DIAGNOSIS/INDICATION: Locally advanced mass fiducial placement, through the duodenal bulb 4 fiducials were placed within the mass, three at the center and one more towards the periphery, no complications noted  RECOMMENDATIONS:   - Post EUS/FNF placement precautions, watch for bleeding, infecti

## 2018-10-09 NOTE — ANESTHESIA POSTPROCEDURE EVALUATION
[address] Patient Status:  Hospital Outpatient Surgery   Age/Gender 65year old male MRN [id number]   Location [address] [place] [address]. Attending Michelle Tirado MD   Hosp Day # 0 PCP Marychuy West MD       Anesthesia Post-op

## 2018-10-10 ENCOUNTER — HOSPITAL ENCOUNTER (OUTPATIENT)
Dept: RADIATION ONCOLOGY | Facility: HOSPITAL | Age: 78
Discharge: HOME | End: 2018-10-10
Attending: RADIOLOGY
Payer: MEDICARE

## 2018-10-10 VITALS
WEIGHT: 217 LBS | SYSTOLIC BLOOD PRESSURE: 161 MMHG | HEART RATE: 82 BPM | BODY MASS INDEX: 29 KG/M2 | DIASTOLIC BLOOD PRESSURE: 84 MMHG

## 2018-10-10 DIAGNOSIS — C25.0 MALIGNANT NEOPLASM OF HEAD OF PANCREAS (HCC): Primary | ICD-10-CM

## 2018-10-10 PROCEDURE — 99214 OFFICE O/P EST MOD 30 MIN: CPT

## 2018-10-10 NOTE — PROGRESS NOTES
Nursing Consultation Note  Patient: Cindy Hou  YOB: 1940  Age: 65year old  Radiation Oncologist: Dr. Carina Salinas  Referring Physician: [email or web address]  Consult Date: 10/10/2018      Chemotherapy: Marisol Gill Negative. Eyes: Negative. Respiratory: Negative. Cardiovascular: Negative. Gastrointestinal: Positive for nausea. Endocrine: Negative. Genitourinary: Negative. Musculoskeletal:        History of arthritis   Skin: Negative.     Allergic/I [address]35  Phone: [phone] Fax: [phone] [facility] - [address], [phone], [address]  [facility] [address]  Phone: [phone] Fax: [phone]      [facility] cystoscopy   • OTHER SURGICAL HISTORY Left 4/2/2016    Procedure: HIP HEMIARTHROPLASTY/ BIPOLAR;  Surgeon: Leonela Michelle MD;  Location: [facility] MAIN OR   • SKIN SURGERY     • TONSILLECTOMY     • TOTAL HIP REPLACEMENT         Social History    Socioeconomic Hi .   Transportation:  Adequate transportation available for expected visits    Pain:   ;Pain Score: 0   ;    ;

## 2018-10-10 NOTE — PATIENT INSTRUCTIONS
-The radiation therapy staff at [facility] will contact you to schedule a planning/\"mapping\" scan    - IF YOU HAVE ANY QUESTIONS OR CONCERNS REGARDING RADIATION IN THE MEANTIME, PLEASE CALL OUR OFFICE: [phone]    -You may also call the \"[facility]\

## 2018-10-12 NOTE — CONSULTS
[address]    PATIENT'S NAME: Elen Alaniz   RADIATION ONCOLOGIST: Mayur Bruner M.D.    PATIENT ACCOUNT #: [de-identified] [facility]   MEDICAL RECORD #: [id number] YOB: 1940   CONSULTATION DATE: 10/10/2018       RADIA breath, and weak after his first cycle of chemotherapy. He discussed this with Dr. Portia Boyd, and he did get some dose reduction for the second cycle of chemotherapy.   Repeat imaging revealed that the mass was essentially unchanged, though his CA 19-9 has co reveals a 24-year-old male who is pleasant, cooperative and alert, awake, and oriented x3. He is in no acute distress. VITAL SIGNS:  He has an ECOG performance score of 2 and a pain score of 0 currently.   Blood pressure is 161/84, pulse of 82, respirat appears involved by radiographic testing. I am optimistic that this will control the disease in this area for some period of time, though this would by no means be considered curative.   Multiple studies have shown that this treatment can at least equal th radiation enteritis or other similar issues. This can never be absolutely guaranteed but would be unusual at these doses, which are generally well tolerated.   Following this long and thorough discussion of all the risks and benefits of treatment, the mary

## 2018-10-15 PROCEDURE — 77470 SPECIAL RADIATION TREATMENT: CPT | Performed by: RADIOLOGY

## 2018-10-15 PROCEDURE — 77290 THER RAD SIMULAJ FIELD CPLX: CPT | Performed by: RADIOLOGY

## 2018-10-15 PROCEDURE — 77334 RADIATION TREATMENT AID(S): CPT | Performed by: RADIOLOGY

## 2018-10-24 PROCEDURE — 77295 3-D RADIOTHERAPY PLAN: CPT | Performed by: RADIOLOGY

## 2018-10-24 PROCEDURE — 77293 RESPIRATOR MOTION MGMT SIMUL: CPT | Performed by: RADIOLOGY

## 2018-10-24 PROCEDURE — 77370 RADIATION PHYSICS CONSULT: CPT | Performed by: RADIOLOGY

## 2018-10-25 ENCOUNTER — TELEPHONE (OUTPATIENT)
Dept: HEMATOLOGY/ONCOLOGY | Facility: HOSPITAL | Age: 78
End: 2018-10-25

## 2018-10-25 NOTE — TELEPHONE ENCOUNTER
Daughter called states patient has a chronic non productive cough. No fevers. Tried Mucinex with no relief. Patient is scheduled for [facility] treatment tomorrow and will need to be still during this treatment.  Per Dr Kaylee Blake, patient to start on Cheratuss

## 2018-10-26 ENCOUNTER — APPOINTMENT (OUTPATIENT)
Dept: RADIATION ONCOLOGY | Facility: HOSPITAL | Age: 78
End: 2018-10-26
Attending: RADIOLOGY
Payer: MEDICARE

## 2018-10-26 PROCEDURE — 77300 RADIATION THERAPY DOSE PLAN: CPT | Performed by: RADIOLOGY

## 2018-10-26 PROCEDURE — 77280 THER RAD SIMULAJ FIELD SMPL: CPT | Performed by: RADIOLOGY

## 2018-10-26 PROCEDURE — 77334 RADIATION TREATMENT AID(S): CPT | Performed by: RADIOLOGY

## 2018-10-26 PROCEDURE — 77373 STRTCTC BDY RAD THER TX DLVR: CPT | Performed by: RADIOLOGY

## 2018-10-29 DIAGNOSIS — Z77.22 DAILY EXPOSURE TO TOBACCO SMOKE: ICD-10-CM

## 2018-10-29 DIAGNOSIS — R73.9 HYPERGLYCEMIA: ICD-10-CM

## 2018-10-29 DIAGNOSIS — C25.0 MALIGNANT NEOPLASM OF HEAD OF PANCREAS (HCC): ICD-10-CM

## 2018-10-29 PROCEDURE — 77373 STRTCTC BDY RAD THER TX DLVR: CPT | Performed by: RADIOLOGY

## 2018-10-31 ENCOUNTER — SNF/IP PROF CHARGE ONLY (OUTPATIENT)
Dept: HEMATOLOGY/ONCOLOGY | Facility: HOSPITAL | Age: 78
End: 2018-10-31

## 2018-10-31 ENCOUNTER — APPOINTMENT (OUTPATIENT)
Dept: RADIATION ONCOLOGY | Facility: HOSPITAL | Age: 78
End: 2018-10-31
Attending: RADIOLOGY
Payer: MEDICARE

## 2018-10-31 DIAGNOSIS — C25.0 MALIGNANT NEOPLASM OF HEAD OF PANCREAS (HCC): ICD-10-CM

## 2018-10-31 PROCEDURE — G9678 ONCOLOGY CARE MODEL SERVICE: HCPCS | Performed by: INTERNAL MEDICINE

## 2018-10-31 PROCEDURE — 77373 STRTCTC BDY RAD THER TX DLVR: CPT | Performed by: RADIOLOGY

## 2018-11-01 ENCOUNTER — APPOINTMENT (OUTPATIENT)
Dept: RADIATION ONCOLOGY | Facility: HOSPITAL | Age: 78
End: 2018-11-01
Attending: RADIOLOGY
Payer: MEDICARE

## 2018-11-02 PROCEDURE — 77373 STRTCTC BDY RAD THER TX DLVR: CPT | Performed by: RADIOLOGY

## 2018-11-05 ENCOUNTER — OFFICE VISIT (OUTPATIENT)
Dept: RADIATION ONCOLOGY | Facility: HOSPITAL | Age: 78
End: 2018-11-05
Attending: RADIOLOGY
Payer: MEDICARE

## 2018-11-05 VITALS
WEIGHT: 218.38 LBS | RESPIRATION RATE: 18 BRPM | HEART RATE: 62 BPM | SYSTOLIC BLOOD PRESSURE: 181 MMHG | DIASTOLIC BLOOD PRESSURE: 79 MMHG | OXYGEN SATURATION: 96 % | BODY MASS INDEX: 30 KG/M2 | TEMPERATURE: 97 F

## 2018-11-05 PROCEDURE — 77336 RADIATION PHYSICS CONSULT: CPT | Performed by: RADIOLOGY

## 2018-11-05 PROCEDURE — 77373 STRTCTC BDY RAD THER TX DLVR: CPT | Performed by: RADIOLOGY

## 2018-11-05 RX ORDER — VERAPAMIL HYDROCHLORIDE 40 MG/1
40 TABLET ORAL 3 TIMES DAILY
COMMUNITY
End: 2019-01-04

## 2018-11-05 NOTE — PATIENT INSTRUCTIONS
Follow up with Dr. Carolene Peabody in 4 weeks. Patience Brochure to call you to schedule appt. Call Christoph DIOR with any radiation issues [phone].

## 2018-11-05 NOTE — PROGRESS NOTES
[facility] Radiation Treatment Management Note 1-5    Patient:  Baljit Malik  Age:  77year old  Visit Diagnosis:  No diagnosis found.   Primary Rad/Onc:  Dr. Pietro Juarez    Site Delivered Dose (Gy) Prescribed Dose (Gy) Morgan Orellana

## 2018-11-06 ENCOUNTER — TELEPHONE (OUTPATIENT)
Dept: HEMATOLOGY/ONCOLOGY | Facility: HOSPITAL | Age: 78
End: 2018-11-06

## 2018-11-06 ENCOUNTER — HOSPITAL ENCOUNTER (OUTPATIENT)
Dept: GENERAL RADIOLOGY | Facility: HOSPITAL | Age: 78
Discharge: HOME | End: 2018-11-06
Attending: INTERNAL MEDICINE
Payer: MEDICARE

## 2018-11-06 ENCOUNTER — APPOINTMENT (OUTPATIENT)
Dept: LAB | Facility: HOSPITAL | Age: 78
End: 2018-11-06
Attending: INTERNAL MEDICINE
Payer: MEDICARE

## 2018-11-06 DIAGNOSIS — C25.0 MALIGNANT NEOPLASM OF HEAD OF PANCREAS (HCC): ICD-10-CM

## 2018-11-06 DIAGNOSIS — R05.9 COUGH: ICD-10-CM

## 2018-11-06 DIAGNOSIS — C25.0 MALIGNANT NEOPLASM OF HEAD OF PANCREAS (HCC): Primary | ICD-10-CM

## 2018-11-06 PROCEDURE — 82378 CARCINOEMBRYONIC ANTIGEN: CPT

## 2018-11-06 PROCEDURE — 86301 IMMUNOASSAY TUMOR CA 19-9: CPT

## 2018-11-06 PROCEDURE — 80053 COMPREHEN METABOLIC PANEL: CPT

## 2018-11-06 PROCEDURE — 36415 COLL VENOUS BLD VENIPUNCTURE: CPT

## 2018-11-06 PROCEDURE — 85610 PROTHROMBIN TIME: CPT

## 2018-11-06 PROCEDURE — 71046 X-RAY EXAM CHEST 2 VIEWS: CPT | Performed by: INTERNAL MEDICINE

## 2018-11-06 NOTE — TELEPHONE ENCOUNTER
Spoke to daughter. Pt has persistent cough. Worse at night. Minimal sputum production. No fever and no purulence - Will check CXR and labs. Will not change meds. Will also check labs.   To see me in a few weeks  Noemí Hinds MD

## 2018-11-06 NOTE — TELEPHONE ENCOUNTER
Spoke to daughter, she has questions. Would like to know if pt is okay to resume daily fish oil and  mg? Also, wondering if pt need abx or chest x-ray, as pt's cough is still persisting.  Cough worst at night with both sitting and laying down and is

## 2018-11-09 ENCOUNTER — TELEPHONE (OUTPATIENT)
Dept: HEMATOLOGY/ONCOLOGY | Facility: HOSPITAL | Age: 78
End: 2018-11-09

## 2018-11-09 RX ORDER — OXYBUTYNIN CHLORIDE 5 MG/1
TABLET ORAL
Qty: 120 ML | Refills: 0 | Status: SHIPPED | OUTPATIENT
Start: 2018-11-09 | End: 2019-03-01

## 2018-11-09 NOTE — TELEPHONE ENCOUNTER
LM for daughter that script was filled but due to codeine they must pick it up in person. Script at [facility] - available to  during open hours. Pt daughter to call with any questions.

## 2018-11-13 NOTE — PROGRESS NOTES
[facility]    PATIENT'S NAME: Garcia Dhruv   RADIATION ONCOLOGIST: Jhonatan Hobson.  Savanna Dooley MD   PATIENT ACCOUNT #: [de-identified] LOCATION: [address] [place] [address] RECORD #: [id number] YOB: 1940   DATE: 11/05/2018       RADIATION ONCO tolerance to chemotherapy, and the patient does not want to go through with a Whipple procedure, he was referred to Radiation Oncology to consider stereotactic treatment for his pancreatic tumor.     TECHNICAL SUMMARY:  The patient was treated with Mattie Catrachita sooner. Thank you very much for allowing me the opportunity to participate in the care of this patient. Should you have any questions regarding the radiotherapy, please feel free to contact me at any time. Dictated By Janneth Chavarria MD  d: 11/

## 2018-11-26 ENCOUNTER — TELEPHONE (OUTPATIENT)
Dept: HEMATOLOGY/ONCOLOGY | Facility: HOSPITAL | Age: 78
End: 2018-11-26

## 2018-11-26 NOTE — TELEPHONE ENCOUNTER
VM received from daughter stating they were supposed to be scheduled with MD this week. Pt has multiple questions for MD regarding medications and vaccines. MD has availability to see pt at 3:30 pm tomorrow. Attempted to contact daughter, but NA.  Left

## 2018-11-27 ENCOUNTER — OFFICE VISIT (OUTPATIENT)
Dept: HEMATOLOGY/ONCOLOGY | Facility: HOSPITAL | Age: 78
End: 2018-11-27
Attending: INTERNAL MEDICINE
Payer: MEDICARE

## 2018-11-27 VITALS
OXYGEN SATURATION: 93 % | WEIGHT: 221.81 LBS | RESPIRATION RATE: 20 BRPM | BODY MASS INDEX: 30.04 KG/M2 | TEMPERATURE: 98 F | HEIGHT: 72.01 IN | DIASTOLIC BLOOD PRESSURE: 85 MMHG | SYSTOLIC BLOOD PRESSURE: 173 MMHG | HEART RATE: 67 BPM

## 2018-11-27 DIAGNOSIS — C25.0 MALIGNANT NEOPLASM OF HEAD OF PANCREAS (HCC): Primary | ICD-10-CM

## 2018-11-27 DIAGNOSIS — R30.0 DYSURIA: ICD-10-CM

## 2018-11-27 PROCEDURE — 99214 OFFICE O/P EST MOD 30 MIN: CPT | Performed by: INTERNAL MEDICINE

## 2018-11-27 NOTE — PROGRESS NOTES
Patient is here for MD f/u for pancreatic cancer. Patient c/o chronic cough with post nasal drip. Patient has been on Zyrtec daily and Virtussin as needed with some relief. Patient received radiation to the pancreatic mass from 10/26-11/5.  Patient is lloyd

## 2018-11-29 NOTE — PROGRESS NOTES
[facility]    PATIENT'S NAME: Willkelly Larry   ATTENDING PHYSICIAN: Ria Thacker M.D.    PATIENT ACCOUNT #: [de-identified] LOCATION: [address] [place] [address] RECORD #: [id number] YOB: 1940   DATE OF SERVICE: 11/27/2018       CANCER CE when it was last done. His appetite is good and overall his spirits are much better. He has very minimal fatigue, but he had an excellent Thanksgiving holiday.   His current medications include Tylenol p.r.n., alprazolam 0.5 mg b.i.d. p.r.n., cetirizine 1 is comfortable with this approach. He is glad he will get through the holidays without any further intervention. He does understand that he still has a stent in place and he will need intervention if he has jaundice and/or fever.       Dictated By Olga Lidia Ayala

## 2018-11-30 ENCOUNTER — SNF/IP PROF CHARGE ONLY (OUTPATIENT)
Dept: HEMATOLOGY/ONCOLOGY | Facility: HOSPITAL | Age: 78
End: 2018-11-30

## 2018-11-30 DIAGNOSIS — C25.0 MALIGNANT NEOPLASM OF HEAD OF PANCREAS (HCC): ICD-10-CM

## 2018-11-30 PROCEDURE — G9678 ONCOLOGY CARE MODEL SERVICE: HCPCS | Performed by: INTERNAL MEDICINE

## 2018-12-06 ENCOUNTER — HOSPITAL ENCOUNTER (OUTPATIENT)
Dept: RADIATION ONCOLOGY | Facility: HOSPITAL | Age: 78
Discharge: HOME | End: 2018-12-06
Attending: RADIOLOGY
Payer: MEDICARE

## 2018-12-06 VITALS
HEART RATE: 67 BPM | OXYGEN SATURATION: 98 % | BODY MASS INDEX: 30 KG/M2 | WEIGHT: 221 LBS | DIASTOLIC BLOOD PRESSURE: 91 MMHG | SYSTOLIC BLOOD PRESSURE: 156 MMHG | RESPIRATION RATE: 17 BRPM

## 2018-12-06 DIAGNOSIS — C25.0 MALIGNANT NEOPLASM OF HEAD OF PANCREAS (HCC): Primary | ICD-10-CM

## 2018-12-06 PROCEDURE — 99213 OFFICE O/P EST LOW 20 MIN: CPT

## 2018-12-06 RX ORDER — LISINOPRIL 10 MG/1
20 TABLET ORAL DAILY
COMMUNITY
End: 2019-03-01 | Stop reason: DRUGHIGH

## 2018-12-06 RX ORDER — DIPHENHYDRAMINE HCL 25 MG
25 CAPSULE ORAL EVERY 6 HOURS PRN
COMMUNITY
End: 2019-07-05

## 2018-12-06 NOTE — PROGRESS NOTES
Nursing Follow-Up Note    Patient: Kaitlynn Monk  YOB: 1940  Age: 77year old  Radiation Oncologist: Dr. Saima Brooks  Referring Physician: Briseida Mccartney  Chief Complaint: Patient presents with:   Follow - Up    Date: 12/6/2018

## 2018-12-06 NOTE — PATIENT INSTRUCTIONS
- CALL [phone] FOR A FOLLOW-UP WITH DR. BRONWYN PACHECO AS NEEDED  - FOLLOW-UP WITH DR. FOSTER AS SCHEDULED  - CALL [phone] TO SCHEDULE YOUR CT SCAN  - CALL IF YOU HAVE ANY QUESTIONS/CONCERNS REGARDING RADIATION THERAPY   [phone]

## 2018-12-07 NOTE — PROGRESS NOTES
[facility]    PATIENT'S NAME: Jalen Garrido   RADIATION ONCOLOGIST: Blossom Schulte. Kenan Siddiqi M.D.    PATIENT ACCOUNT #: [de-identified] Nestor Tubbs   [facility]   MEDICAL RECORD #: [id number] YOB: 1940   FOLLOW-UP DATE: 12/06/2018       RADIATIO degree. Because of this poor tolerance and the fact that the patient was uninterested in surgery, he was referred to Radiation Oncology to consider stereotactic treatment to the pancreatic tumor to achieve some local control of his disease.   I then gave h down to some degree, which is encouraging. I am hopeful that the scan will show at least stability of the mass if not potentially some reduction.   Certainly, this treatment is not curative, but it may help prolong his time to disease progression and fortu

## 2018-12-31 ENCOUNTER — SNF/IP PROF CHARGE ONLY (OUTPATIENT)
Dept: HEMATOLOGY/ONCOLOGY | Facility: HOSPITAL | Age: 78
End: 2018-12-31

## 2018-12-31 ENCOUNTER — HOSPITAL ENCOUNTER (OUTPATIENT)
Dept: CT IMAGING | Facility: HOSPITAL | Age: 78
Discharge: HOME | End: 2018-12-31
Attending: INTERNAL MEDICINE
Payer: MEDICARE

## 2018-12-31 DIAGNOSIS — C25.0 MALIGNANT NEOPLASM OF HEAD OF PANCREAS (HCC): ICD-10-CM

## 2018-12-31 LAB — CREAT SERPL-MCNC: 1.2 MG/DL (ref 0.7–1.3)

## 2018-12-31 PROCEDURE — G9678 ONCOLOGY CARE MODEL SERVICE: HCPCS | Performed by: INTERNAL MEDICINE

## 2018-12-31 PROCEDURE — 82565 ASSAY OF CREATININE: CPT

## 2018-12-31 PROCEDURE — 74177 CT ABD & PELVIS W/CONTRAST: CPT | Performed by: INTERNAL MEDICINE

## 2018-12-31 PROCEDURE — 71260 CT THORAX DX C+: CPT | Performed by: INTERNAL MEDICINE

## 2019-01-04 ENCOUNTER — OFFICE VISIT (OUTPATIENT)
Dept: HEMATOLOGY/ONCOLOGY | Facility: HOSPITAL | Age: 79
End: 2019-01-04
Attending: INTERNAL MEDICINE
Payer: MEDICARE

## 2019-01-04 VITALS
TEMPERATURE: 97 F | HEART RATE: 64 BPM | SYSTOLIC BLOOD PRESSURE: 163 MMHG | WEIGHT: 233 LBS | DIASTOLIC BLOOD PRESSURE: 75 MMHG | OXYGEN SATURATION: 93 % | BODY MASS INDEX: 32 KG/M2 | RESPIRATION RATE: 18 BRPM

## 2019-01-04 DIAGNOSIS — Z85.07 HISTORY OF PANCREATIC CANCER: Primary | ICD-10-CM

## 2019-01-04 LAB
ALBUMIN SERPL-MCNC: 3.3 G/DL (ref 3.1–4.5)
ALBUMIN/GLOB SERPL: 0.7 {RATIO} (ref 1–2)
ALP LIVER SERPL-CCNC: 91 U/L (ref 45–117)
ALT SERPL-CCNC: 22 U/L (ref 17–63)
ANION GAP SERPL CALC-SCNC: 6 MMOL/L (ref 0–18)
AST SERPL-CCNC: 13 U/L (ref 15–41)
BASOPHILS # BLD AUTO: 0.02 X10(3) UL (ref 0–0.1)
BASOPHILS NFR BLD AUTO: 0.4 %
BILIRUB SERPL-MCNC: 0.3 MG/DL (ref 0.1–2)
BUN BLD-MCNC: 15 MG/DL (ref 8–20)
BUN/CREAT SERPL: 10.4 (ref 10–20)
CALCIUM BLD-MCNC: 8.6 MG/DL (ref 8.3–10.3)
CANCER AG19-9 SERPL-ACNC: 15.4 U/ML (ref ?–37)
CHLORIDE SERPL-SCNC: 106 MMOL/L (ref 101–111)
CO2 SERPL-SCNC: 29 MMOL/L (ref 22–32)
CREAT BLD-MCNC: 1.44 MG/DL (ref 0.7–1.3)
EOSINOPHIL # BLD AUTO: 0.19 X10(3) UL (ref 0–0.3)
EOSINOPHIL NFR BLD AUTO: 3.5 %
ERYTHROCYTE [DISTWIDTH] IN BLOOD BY AUTOMATED COUNT: 12.6 % (ref 11.5–16)
GLOBULIN PLAS-MCNC: 4.6 G/DL (ref 2.8–4.4)
GLUCOSE BLD-MCNC: 174 MG/DL (ref 70–99)
HCT VFR BLD AUTO: 43.8 % (ref 37–53)
HGB BLD-MCNC: 14.2 G/DL (ref 13–17)
IMMATURE GRANULOCYTE COUNT: 0.02 X10(3) UL (ref 0–1)
IMMATURE GRANULOCYTE RATIO %: 0.4 %
LYMPHOCYTES # BLD AUTO: 1.35 X10(3) UL (ref 0.9–4)
LYMPHOCYTES NFR BLD AUTO: 25.1 %
M PROTEIN MFR SERPL ELPH: 7.9 G/DL (ref 6.4–8.2)
MCH RBC QN AUTO: 30.7 PG (ref 27–33.2)
MCHC RBC AUTO-ENTMCNC: 32.4 G/DL (ref 31–37)
MCV RBC AUTO: 94.8 FL (ref 80–99)
MONOCYTES # BLD AUTO: 0.55 X10(3) UL (ref 0.1–1)
MONOCYTES NFR BLD AUTO: 10.2 %
NEUTROPHIL ABS PRELIM: 3.25 X10 (3) UL (ref 1.3–6.7)
NEUTROPHILS # BLD AUTO: 3.25 X10(3) UL (ref 1.3–6.7)
NEUTROPHILS NFR BLD AUTO: 60.4 %
OSMOLALITY SERPL CALC.SUM OF ELEC: 297 MOSM/KG (ref 275–295)
PLATELET # BLD AUTO: 203 10(3)UL (ref 150–450)
POTASSIUM SERPL-SCNC: 4.3 MMOL/L (ref 3.6–5.1)
RBC # BLD AUTO: 4.62 X10(6)UL (ref 3.8–5.8)
RED CELL DISTRIBUTION WIDTH-SD: 43.9 FL (ref 35.1–46.3)
SODIUM SERPL-SCNC: 141 MMOL/L (ref 136–144)
WBC # BLD AUTO: 5.4 X10(3) UL (ref 4–13)

## 2019-01-04 PROCEDURE — 99214 OFFICE O/P EST MOD 30 MIN: CPT | Performed by: INTERNAL MEDICINE

## 2019-01-04 RX ORDER — LEVOFLOXACIN 500 MG/1
500 TABLET, FILM COATED ORAL DAILY
Qty: 7 TABLET | Refills: 3 | Status: SHIPPED | OUTPATIENT
Start: 2019-01-04 | End: 2019-07-05

## 2019-01-04 RX ORDER — LISINOPRIL 20 MG/1
20 TABLET ORAL DAILY
Qty: 30 TABLET | Refills: 11 | Status: SHIPPED | OUTPATIENT
Start: 2019-01-04 | End: 2019-07-05

## 2019-01-04 NOTE — PROGRESS NOTES
Patient is here for MD f/u for pancreatic cancer. Patient c/o weight gain. Abdominal bloating. Mild epigastric pain. Patient has been taking Benadryl nightly for allergies. Daughter has noticed patient has been forgetful. Patient had a Ct scan on 12/31.

## 2019-01-07 NOTE — PROGRESS NOTES
[facility]    PATIENT'S NAME: Adelaida Dee   ATTENDING PHYSICIAN: Sarah Frost M.D.    PATIENT ACCOUNT #: [de-identified] LOCATION: [address] [place] [address] RECORD #: [id number] YOB: 1940   DATE OF SERVICE: 01/04/2019       CANCER CE at home in case of fever due to the presence of the stent, levothyroxine 50 mcg daily, lisinopril 10 mg daily and I am increasing this to 20 mg daily given his elevated blood pressure, tamsulosin 0.4 mg daily, venlafaxine 75 mg extended release daily, and in 2 months and I will ask him to get a set of labs done in 1 month. Dictated By Pretty Wilson M.D.  d: 01/07/2019 06:36:25  t: 01/07/2019 12:07:53  [facility] [id number]  RG/    cc: CARLEE Altamirano M.D.    [place] [id number]

## 2019-01-09 ENCOUNTER — OFFICE VISIT (OUTPATIENT)
Dept: PHYSICAL THERAPY | Age: 79
End: 2019-01-09
Attending: INTERNAL MEDICINE
Payer: MEDICARE

## 2019-01-09 PROCEDURE — 97161 PT EVAL LOW COMPLEX 20 MIN: CPT

## 2019-01-09 NOTE — PROGRESS NOTES
LOWER EXTREMITY EVALUATION:   Referring Physician: Dr. Dagmar Merino  Diagnosis: decreased endurance and LE strength, pancreatic cancer      Date of Service: 1/9/2019     PATIENT SUMMARY   Huong Coy is a 77year old y/o male who presents to therapy today function he reports no difficulty with walking community distances. Pt goals include easier in and out of chair, walking endurance, getting on/off the floor, and possibly to help address the chest pain.   He denies any falls, but does report his balance fee for: pt instructions, LE strengthening    Charges: PT Eval Low Complexity due to generalized weakness and no complaints of pain      Total Timed Treatment: 40 min     Total Treatment Time: 5 min HEP review    PLAN OF CARE:    Goals:    1)Pt.  to be independ

## 2019-01-10 ENCOUNTER — APPOINTMENT (OUTPATIENT)
Dept: PHYSICAL THERAPY | Age: 79
End: 2019-01-10
Attending: INTERNAL MEDICINE
Payer: MEDICARE

## 2019-01-14 ENCOUNTER — APPOINTMENT (OUTPATIENT)
Dept: PHYSICAL THERAPY | Age: 79
End: 2019-01-14
Attending: INTERNAL MEDICINE
Payer: MEDICARE

## 2019-01-15 DIAGNOSIS — C25.0 MALIGNANT NEOPLASM OF HEAD OF PANCREAS (HCC): ICD-10-CM

## 2019-01-15 DIAGNOSIS — R73.9 HYPERGLYCEMIA: ICD-10-CM

## 2019-01-15 DIAGNOSIS — Z77.22 DAILY EXPOSURE TO TOBACCO SMOKE: ICD-10-CM

## 2019-01-15 RX ORDER — TAMSULOSIN HYDROCHLORIDE 0.4 MG/1
0.4 CAPSULE ORAL DAILY
Qty: 90 CAPSULE | Refills: 3 | Status: SHIPPED | OUTPATIENT
Start: 2019-01-15 | End: 2020-01-30

## 2019-01-23 ENCOUNTER — OFFICE VISIT (OUTPATIENT)
Dept: PHYSICAL THERAPY | Age: 79
End: 2019-01-23
Attending: INTERNAL MEDICINE
Payer: MEDICARE

## 2019-01-23 PROCEDURE — 97110 THERAPEUTIC EXERCISES: CPT

## 2019-01-23 PROCEDURE — 97112 NEUROMUSCULAR REEDUCATION: CPT

## 2019-01-23 NOTE — PROGRESS NOTES
Dx: decreased endurance and LE strength, pancreatic cancer              Authorized # of Visits:  9 recommended         Next MD visit: none scheduled  Fall Risk: standard         Precautions: n/a             Subjective: he reports just hasn't felt good all

## 2019-01-28 ENCOUNTER — APPOINTMENT (OUTPATIENT)
Dept: PHYSICAL THERAPY | Age: 79
End: 2019-01-28
Attending: INTERNAL MEDICINE
Payer: MEDICARE

## 2019-01-30 ENCOUNTER — APPOINTMENT (OUTPATIENT)
Dept: PHYSICAL THERAPY | Age: 79
End: 2019-01-30
Attending: INTERNAL MEDICINE
Payer: MEDICARE

## 2019-01-31 ENCOUNTER — SNF/IP PROF CHARGE ONLY (OUTPATIENT)
Dept: HEMATOLOGY/ONCOLOGY | Facility: HOSPITAL | Age: 79
End: 2019-01-31

## 2019-01-31 DIAGNOSIS — C25.0 MALIGNANT NEOPLASM OF HEAD OF PANCREAS (HCC): ICD-10-CM

## 2019-01-31 PROCEDURE — G9678 ONCOLOGY CARE MODEL SERVICE: HCPCS | Performed by: INTERNAL MEDICINE

## 2019-02-01 ENCOUNTER — NURSE ONLY (OUTPATIENT)
Dept: HEMATOLOGY/ONCOLOGY | Facility: HOSPITAL | Age: 79
End: 2019-02-01
Attending: INTERNAL MEDICINE
Payer: MEDICARE

## 2019-02-01 DIAGNOSIS — Z85.07 HISTORY OF PANCREATIC CANCER: ICD-10-CM

## 2019-02-01 LAB
ALBUMIN SERPL-MCNC: 3.4 G/DL (ref 3.1–4.5)
ALBUMIN/GLOB SERPL: 0.8 {RATIO} (ref 1–2)
ALP LIVER SERPL-CCNC: 86 U/L (ref 45–117)
ALT SERPL-CCNC: 22 U/L (ref 17–63)
ANION GAP SERPL CALC-SCNC: 7 MMOL/L (ref 0–18)
AST SERPL-CCNC: 18 U/L (ref 15–41)
BASOPHILS # BLD AUTO: 0.03 X10(3) UL (ref 0–0.2)
BASOPHILS NFR BLD AUTO: 0.5 %
BILIRUB SERPL-MCNC: 0.4 MG/DL (ref 0.1–2)
BUN BLD-MCNC: 17 MG/DL (ref 8–20)
BUN/CREAT SERPL: 11 (ref 10–20)
CALCIUM BLD-MCNC: 8.6 MG/DL (ref 8.3–10.3)
CANCER AG19-9 SERPL-ACNC: 15.6 U/ML (ref ?–37)
CHLORIDE SERPL-SCNC: 108 MMOL/L (ref 101–111)
CO2 SERPL-SCNC: 26 MMOL/L (ref 22–32)
CREAT BLD-MCNC: 1.54 MG/DL (ref 0.7–1.3)
DEPRECATED RDW RBC AUTO: 50.5 FL (ref 35.1–46.3)
EOSINOPHIL # BLD AUTO: 0.21 X10(3) UL (ref 0–0.7)
EOSINOPHIL NFR BLD AUTO: 3.5 %
ERYTHROCYTE [DISTWIDTH] IN BLOOD BY AUTOMATED COUNT: 14.6 % (ref 11–15)
GLOBULIN PLAS-MCNC: 4.5 G/DL (ref 2.8–4.4)
GLUCOSE BLD-MCNC: 153 MG/DL (ref 70–99)
HCT VFR BLD AUTO: 47.1 % (ref 39–53)
HGB BLD-MCNC: 15.1 G/DL (ref 13–17.5)
IMM GRANULOCYTES # BLD AUTO: 0.01 X10(3) UL (ref 0–1)
IMM GRANULOCYTES NFR BLD: 0.2 %
LYMPHOCYTES # BLD AUTO: 1.73 X10(3) UL (ref 1–4)
LYMPHOCYTES NFR BLD AUTO: 29 %
M PROTEIN MFR SERPL ELPH: 7.9 G/DL (ref 6.4–8.2)
MCH RBC QN AUTO: 30.7 PG (ref 26–34)
MCHC RBC AUTO-ENTMCNC: 32.1 G/DL (ref 31–37)
MCV RBC AUTO: 95.7 FL (ref 80–100)
MONOCYTES # BLD AUTO: 0.59 X10(3) UL (ref 0.1–1)
MONOCYTES NFR BLD AUTO: 9.9 %
NEUTROPHILS # BLD AUTO: 3.4 X10 (3) UL (ref 1.5–7.7)
NEUTROPHILS # BLD AUTO: 3.4 X10(3) UL (ref 1.5–7.7)
NEUTROPHILS NFR BLD AUTO: 56.9 %
OSMOLALITY SERPL CALC.SUM OF ELEC: 297 MOSM/KG (ref 275–295)
PLATELET # BLD AUTO: 149 10(3)UL (ref 150–450)
POTASSIUM SERPL-SCNC: 4.2 MMOL/L (ref 3.6–5.1)
RBC # BLD AUTO: 4.92 X10(6)UL (ref 3.8–5.8)
SODIUM SERPL-SCNC: 141 MMOL/L (ref 136–144)
WBC # BLD AUTO: 6 X10(3) UL (ref 4–11)

## 2019-02-01 PROCEDURE — 36415 COLL VENOUS BLD VENIPUNCTURE: CPT

## 2019-02-01 PROCEDURE — 80053 COMPREHEN METABOLIC PANEL: CPT

## 2019-02-01 PROCEDURE — 86301 IMMUNOASSAY TUMOR CA 19-9: CPT

## 2019-02-01 PROCEDURE — 85025 COMPLETE CBC W/AUTO DIFF WBC: CPT

## 2019-02-04 ENCOUNTER — OFFICE VISIT (OUTPATIENT)
Dept: PHYSICAL THERAPY | Age: 79
End: 2019-02-04
Attending: INTERNAL MEDICINE
Payer: MEDICARE

## 2019-02-04 PROCEDURE — 97112 NEUROMUSCULAR REEDUCATION: CPT

## 2019-02-04 PROCEDURE — 97110 THERAPEUTIC EXERCISES: CPT

## 2019-02-06 ENCOUNTER — APPOINTMENT (OUTPATIENT)
Dept: PHYSICAL THERAPY | Age: 79
End: 2019-02-06
Attending: INTERNAL MEDICINE
Payer: MEDICARE

## 2019-02-07 ENCOUNTER — OFFICE VISIT (OUTPATIENT)
Dept: PHYSICAL THERAPY | Age: 79
End: 2019-02-07
Attending: INTERNAL MEDICINE
Payer: MEDICARE

## 2019-02-07 PROCEDURE — 97112 NEUROMUSCULAR REEDUCATION: CPT

## 2019-02-07 PROCEDURE — 97110 THERAPEUTIC EXERCISES: CPT

## 2019-02-07 PROCEDURE — 97530 THERAPEUTIC ACTIVITIES: CPT

## 2019-02-07 NOTE — PROGRESS NOTES
Dx: decreased endurance and LE strength, pancreatic cancer              Authorized # of Visits:  9 recommended         Next MD visit: none scheduled  Fall Risk: standard         Precautions: n/a           Subjective: he reports just hasn't felt good all we

## 2019-02-11 ENCOUNTER — TELEPHONE (OUTPATIENT)
Dept: PHYSICAL THERAPY | Age: 79
End: 2019-02-11

## 2019-02-11 ENCOUNTER — APPOINTMENT (OUTPATIENT)
Dept: PHYSICAL THERAPY | Age: 79
End: 2019-02-11
Attending: INTERNAL MEDICINE
Payer: MEDICARE

## 2019-02-13 ENCOUNTER — OFFICE VISIT (OUTPATIENT)
Dept: PHYSICAL THERAPY | Age: 79
End: 2019-02-13
Attending: FAMILY MEDICINE
Payer: MEDICARE

## 2019-02-13 PROCEDURE — 97112 NEUROMUSCULAR REEDUCATION: CPT

## 2019-02-13 PROCEDURE — 97110 THERAPEUTIC EXERCISES: CPT

## 2019-02-13 PROCEDURE — 97530 THERAPEUTIC ACTIVITIES: CPT

## 2019-02-13 NOTE — PROGRESS NOTES
Dx: decreased endurance and LE strength, pancreatic cancer              Authorized # of Visits:  9 recommended         Next MD visit: none scheduled  Fall Risk: standard         Precautions: n/a           Subjective: he reports just hasn't felt good all we Skilled Services: skilled selection of there ex and balance training, one on one services provided and education throughout session     Charges: there ex 1, funct act 1, neuro re ed 1       Total Timed Treatment: 45 min  Total Treatment Time: 45 min

## 2019-03-01 ENCOUNTER — OFFICE VISIT (OUTPATIENT)
Dept: HEMATOLOGY/ONCOLOGY | Facility: HOSPITAL | Age: 79
End: 2019-03-01
Attending: INTERNAL MEDICINE
Payer: MEDICARE

## 2019-03-01 VITALS
SYSTOLIC BLOOD PRESSURE: 178 MMHG | HEART RATE: 66 BPM | BODY MASS INDEX: 32.1 KG/M2 | DIASTOLIC BLOOD PRESSURE: 87 MMHG | RESPIRATION RATE: 18 BRPM | WEIGHT: 237 LBS | OXYGEN SATURATION: 96 % | HEIGHT: 72.01 IN | TEMPERATURE: 98 F

## 2019-03-01 DIAGNOSIS — Z85.07 HISTORY OF PANCREATIC CANCER: ICD-10-CM

## 2019-03-01 DIAGNOSIS — Z85.00 PERSONAL HISTORY OF MALIGNANT NEOPLASM OF DIGESTIVE ORGAN: ICD-10-CM

## 2019-03-01 DIAGNOSIS — C25.0 MALIGNANT NEOPLASM OF HEAD OF PANCREAS (HCC): Primary | ICD-10-CM

## 2019-03-01 LAB
ALBUMIN SERPL-MCNC: 3.5 G/DL (ref 3.4–5)
ALBUMIN/GLOB SERPL: 0.8 {RATIO} (ref 1–2)
ALP LIVER SERPL-CCNC: 86 U/L (ref 45–117)
ALT SERPL-CCNC: 26 U/L (ref 16–61)
ANION GAP SERPL CALC-SCNC: 6 MMOL/L (ref 0–18)
AST SERPL-CCNC: 18 U/L (ref 15–37)
BASOPHILS # BLD AUTO: 0.02 X10(3) UL (ref 0–0.2)
BASOPHILS NFR BLD AUTO: 0.3 %
BILIRUB SERPL-MCNC: 0.4 MG/DL (ref 0.1–2)
BUN BLD-MCNC: 14 MG/DL (ref 7–18)
BUN/CREAT SERPL: 10.5 (ref 10–20)
CALCIUM BLD-MCNC: 9 MG/DL (ref 8.5–10.1)
CANCER AG19-9 SERPL-ACNC: 17.6 U/ML (ref ?–37)
CHLORIDE SERPL-SCNC: 108 MMOL/L (ref 98–107)
CO2 SERPL-SCNC: 27 MMOL/L (ref 21–32)
CREAT BLD-MCNC: 1.33 MG/DL (ref 0.7–1.3)
DEPRECATED RDW RBC AUTO: 47.7 FL (ref 35.1–46.3)
EOSINOPHIL # BLD AUTO: 0.2 X10(3) UL (ref 0–0.7)
EOSINOPHIL NFR BLD AUTO: 3.1 %
ERYTHROCYTE [DISTWIDTH] IN BLOOD BY AUTOMATED COUNT: 13.9 % (ref 11–15)
GLOBULIN PLAS-MCNC: 4.3 G/DL (ref 2.8–4.4)
GLUCOSE BLD-MCNC: 113 MG/DL (ref 70–99)
HCT VFR BLD AUTO: 43.6 % (ref 39–53)
HGB BLD-MCNC: 14.2 G/DL (ref 13–17.5)
IMM GRANULOCYTES # BLD AUTO: 0.02 X10(3) UL (ref 0–1)
IMM GRANULOCYTES NFR BLD: 0.3 %
LYMPHOCYTES # BLD AUTO: 1.62 X10(3) UL (ref 1–4)
LYMPHOCYTES NFR BLD AUTO: 25.4 %
M PROTEIN MFR SERPL ELPH: 7.8 G/DL (ref 6.4–8.2)
MCH RBC QN AUTO: 30.8 PG (ref 26–34)
MCHC RBC AUTO-ENTMCNC: 32.6 G/DL (ref 31–37)
MCV RBC AUTO: 94.6 FL (ref 80–100)
MONOCYTES # BLD AUTO: 0.71 X10(3) UL (ref 0.1–1)
MONOCYTES NFR BLD AUTO: 11.1 %
NEUTROPHILS # BLD AUTO: 3.82 X10 (3) UL (ref 1.5–7.7)
NEUTROPHILS # BLD AUTO: 3.82 X10(3) UL (ref 1.5–7.7)
NEUTROPHILS NFR BLD AUTO: 59.8 %
OSMOLALITY SERPL CALC.SUM OF ELEC: 293 MOSM/KG (ref 275–295)
PLATELET # BLD AUTO: 204 10(3)UL (ref 150–450)
POTASSIUM SERPL-SCNC: 4.2 MMOL/L (ref 3.5–5.1)
RBC # BLD AUTO: 4.61 X10(6)UL (ref 3.8–5.8)
SODIUM SERPL-SCNC: 141 MMOL/L (ref 136–145)
WBC # BLD AUTO: 6.4 X10(3) UL (ref 4–11)

## 2019-03-01 PROCEDURE — 99214 OFFICE O/P EST MOD 30 MIN: CPT | Performed by: INTERNAL MEDICINE

## 2019-03-01 RX ORDER — VERAPAMIL HYDROCHLORIDE 100 MG/1
CAPSULE, EXTENDED RELEASE ORAL
Refills: 1 | COMMUNITY
Start: 2018-09-11 | End: 2019-08-13

## 2019-03-01 RX ORDER — LISINOPRIL 40 MG/1
40 TABLET ORAL DAILY
Qty: 30 TABLET | Refills: 11 | Status: SHIPPED | OUTPATIENT
Start: 2019-03-01 | End: 2019-07-05

## 2019-03-01 NOTE — PROGRESS NOTES
Patient is here for MD f/u for pancreatic cancer. In the past few weeks, patient has been wheezing and c/o increase SOB. Patient is now using a Ventolin inhaler. Patient noticed this after the weather had changed and the snow was melting.  Patient is [facility]

## 2019-03-05 NOTE — PROGRESS NOTES
[facility]    PATIENT'S NAME: Zach De La Rosa   ATTENDING PHYSICIAN: Key Cooley M.D.    PATIENT ACCOUNT #: [de-identified] LOCATION: [address] [place] [address] RECORD #: [id number] YOB: 1940   DATE OF SERVICE: 03/01/2019       CANCER CE Humalog insulin 20 units 3 times a day with meals, Lantus insulin 60 units q.a.m., levofloxacin which he has at home in case he has fever with the presence of the stent, levothyroxine 50 mcg daily, lisinopril now increasing to 40 mg daily, tamsulosin 0.4 m alternative chemotherapy regimen may be a possibility for him in the future if and when he progresses. Dictated By Sharlene Mata M.D.  d: 03/05/2019 06:35:10  t: 03/05/2019 13:13:15  [facility] [id number]  AH/    cc: FLACO Monet

## 2019-03-11 RX ORDER — AMOXICILLIN 500 MG/1
500 TABLET, FILM COATED ORAL EVERY 8 HOURS
Qty: 4 TABLET | Refills: 0 | Status: SHIPPED | OUTPATIENT
Start: 2019-03-11 | End: 2019-07-05

## 2019-03-13 ENCOUNTER — MED REC SCAN ONLY (OUTPATIENT)
Dept: HEMATOLOGY/ONCOLOGY | Facility: HOSPITAL | Age: 79
End: 2019-03-13

## 2019-04-19 ENCOUNTER — APPOINTMENT (OUTPATIENT)
Dept: GENERAL RADIOLOGY | Facility: HOSPITAL | Age: 79
End: 2019-04-19
Attending: EMERGENCY MEDICINE
Payer: MEDICARE

## 2019-04-19 ENCOUNTER — HOSPITAL ENCOUNTER (EMERGENCY)
Facility: HOSPITAL | Age: 79
Discharge: HOME | End: 2019-04-19
Attending: EMERGENCY MEDICINE
Payer: MEDICARE

## 2019-04-19 ENCOUNTER — APPOINTMENT (OUTPATIENT)
Dept: CT IMAGING | Facility: HOSPITAL | Age: 79
End: 2019-04-19
Attending: EMERGENCY MEDICINE
Payer: MEDICARE

## 2019-04-19 VITALS
RESPIRATION RATE: 18 BRPM | BODY MASS INDEX: 31.42 KG/M2 | OXYGEN SATURATION: 96 % | HEIGHT: 72 IN | DIASTOLIC BLOOD PRESSURE: 80 MMHG | SYSTOLIC BLOOD PRESSURE: 172 MMHG | TEMPERATURE: 99 F | HEART RATE: 60 BPM | WEIGHT: 232 LBS

## 2019-04-19 DIAGNOSIS — S49.91XA INJURY OF RIGHT SHOULDER, INITIAL ENCOUNTER: Primary | ICD-10-CM

## 2019-04-19 DIAGNOSIS — S46.001A INJURY OF RIGHT ROTATOR CUFF, INITIAL ENCOUNTER: ICD-10-CM

## 2019-04-19 DIAGNOSIS — S09.90XA INJURY OF HEAD, INITIAL ENCOUNTER: ICD-10-CM

## 2019-04-19 PROCEDURE — 70450 CT HEAD/BRAIN W/O DYE: CPT | Performed by: EMERGENCY MEDICINE

## 2019-04-19 PROCEDURE — 99284 EMERGENCY DEPT VISIT MOD MDM: CPT | Performed by: EMERGENCY MEDICINE

## 2019-04-19 PROCEDURE — 82962 GLUCOSE BLOOD TEST: CPT

## 2019-04-19 PROCEDURE — 73030 X-RAY EXAM OF SHOULDER: CPT | Performed by: EMERGENCY MEDICINE

## 2019-04-19 PROCEDURE — 71046 X-RAY EXAM CHEST 2 VIEWS: CPT | Performed by: EMERGENCY MEDICINE

## 2019-04-19 PROCEDURE — 99285 EMERGENCY DEPT VISIT HI MDM: CPT | Performed by: EMERGENCY MEDICINE

## 2019-04-19 PROCEDURE — 81003 URINALYSIS AUTO W/O SCOPE: CPT | Performed by: EMERGENCY MEDICINE

## 2019-04-19 RX ORDER — ONDANSETRON 4 MG/1
4 TABLET, ORALLY DISINTEGRATING ORAL EVERY 4 HOURS PRN
Qty: 20 TABLET | Refills: 0 | Status: SHIPPED | OUTPATIENT
Start: 2019-04-19 | End: 2019-07-05

## 2019-04-19 RX ORDER — TRAMADOL HYDROCHLORIDE 50 MG/1
50 TABLET ORAL EVERY 6 HOURS PRN
Qty: 10 TABLET | Refills: 0 | Status: SHIPPED | OUTPATIENT
Start: 2019-04-19 | End: 2019-04-26

## 2019-04-19 NOTE — ED PROVIDER NOTES
Patient Seen in: [facility] Emergency Department    History   Patient presents with:  Upper Extremity Injury (musculoskeletal)    Stated Complaint: Right shoulder pain and headache, cabinet fell on top of patient    HPI    This is a 65-year-old male essential hypertension    • Visual impairment     glasses   • Wears glasses        Past Surgical History:   Procedure Laterality Date   • COLONOSCOPY     • ENDOSCOPIC RETROGRADE CHOLANGIOPANCREATOGRAPHY (ERCP) N/A 10/9/2018    Performed by Esther Simental cranial nerves are grossly intact  The patient is in no respiratory distress. The patient is not septic or toxic    HEENT:  conjunctiva are not pale. There is no icterus. Oral mucosa Is wet. No facial trauma. The neck is supple.     LUNGS: Clear to ausc and struck his right shoulder as well. Patient is unable to abduct his right arm due to the injury to his shoulder. FINDINGS:  Heart size is within normal limits. Pleural spaces appear clear.   Mediastinal and hilar contours are normal.  No focal consol Radiology Data Registry) which includes the Dose Index Registry. PATIENT STATED HISTORY: (As transcribed by Technologist)  Patient had a cabinet fall on hit forehead. Denies loss of consciousness.     FINDINGS:  There is cerebral atrophy with symmetric pro Disp-10 tablet, R-0    ondansetron 4 MG Oral Tablet Dispersible  Take 1 tablet (4 mg total) by mouth every 4 (four) hours as needed for Nausea. , Print Script, Disp-20 tablet, R-0

## 2019-04-19 NOTE — ED INITIAL ASSESSMENT (HPI)
PT states cabinets were stacked in the garage, one fell on top of him. Cabinet hit his head and onto his R shoulder. PT carrying cooler at time, and rotated R shoulder as well. PT did not fall to ground, no LOC.

## 2019-04-23 ENCOUNTER — HOSPITAL ENCOUNTER (OUTPATIENT)
Dept: MRI IMAGING | Age: 79
Discharge: HOME | End: 2019-04-23
Attending: FAMILY MEDICINE
Payer: MEDICARE

## 2019-04-23 DIAGNOSIS — M25.511 ACUTE PAIN OF RIGHT SHOULDER: ICD-10-CM

## 2019-04-23 DIAGNOSIS — Z91.81 STATUS POST FALL: ICD-10-CM

## 2019-04-23 PROCEDURE — 73221 MRI JOINT UPR EXTREM W/O DYE: CPT | Performed by: FAMILY MEDICINE

## 2019-04-30 ENCOUNTER — HOSPITAL ENCOUNTER (OUTPATIENT)
Dept: CT IMAGING | Facility: HOSPITAL | Age: 79
Discharge: HOME | End: 2019-04-30
Attending: INTERNAL MEDICINE
Payer: MEDICARE

## 2019-04-30 ENCOUNTER — LAB ENCOUNTER (OUTPATIENT)
Dept: LAB | Facility: HOSPITAL | Age: 79
End: 2019-04-30
Attending: INTERNAL MEDICINE
Payer: MEDICARE

## 2019-04-30 DIAGNOSIS — E11.9 DIABETES MELLITUS (HCC): ICD-10-CM

## 2019-04-30 DIAGNOSIS — Z85.00 PERSONAL HISTORY OF MALIGNANT NEOPLASM OF DIGESTIVE ORGAN: ICD-10-CM

## 2019-04-30 DIAGNOSIS — Z85.07 HISTORY OF PANCREATIC CANCER: ICD-10-CM

## 2019-04-30 DIAGNOSIS — R53.83 FATIGUE: Primary | ICD-10-CM

## 2019-04-30 PROCEDURE — 83036 HEMOGLOBIN GLYCOSYLATED A1C: CPT

## 2019-04-30 PROCEDURE — 85025 COMPLETE CBC W/AUTO DIFF WBC: CPT

## 2019-04-30 PROCEDURE — 82378 CARCINOEMBRYONIC ANTIGEN: CPT

## 2019-04-30 PROCEDURE — 84443 ASSAY THYROID STIM HORMONE: CPT

## 2019-04-30 PROCEDURE — 80053 COMPREHEN METABOLIC PANEL: CPT

## 2019-04-30 PROCEDURE — 71260 CT THORAX DX C+: CPT | Performed by: INTERNAL MEDICINE

## 2019-04-30 PROCEDURE — 82565 ASSAY OF CREATININE: CPT

## 2019-04-30 PROCEDURE — 74177 CT ABD & PELVIS W/CONTRAST: CPT | Performed by: INTERNAL MEDICINE

## 2019-04-30 PROCEDURE — 86301 IMMUNOASSAY TUMOR CA 19-9: CPT

## 2019-04-30 PROCEDURE — 84439 ASSAY OF FREE THYROXINE: CPT

## 2019-04-30 PROCEDURE — 36415 COLL VENOUS BLD VENIPUNCTURE: CPT

## 2019-05-01 DIAGNOSIS — C25.0 MALIGNANT NEOPLASM OF HEAD OF PANCREAS (HCC): Primary | ICD-10-CM

## 2019-05-07 PROBLEM — M75.101 NON-TRAUMATIC ROTATOR CUFF TEAR, RIGHT: Status: ACTIVE | Noted: 2019-05-07

## 2019-05-09 ENCOUNTER — LAB ENCOUNTER (OUTPATIENT)
Dept: LAB | Facility: HOSPITAL | Age: 79
End: 2019-05-09
Attending: INTERNAL MEDICINE
Payer: MEDICARE

## 2019-05-09 DIAGNOSIS — E11.9 DIABETES MELLITUS WITHOUT COMPLICATION (HCC): Primary | ICD-10-CM

## 2019-05-09 DIAGNOSIS — E03.9 ACQUIRED HYPOTHYROIDISM: ICD-10-CM

## 2019-05-09 DIAGNOSIS — C25.0 MALIGNANT NEOPLASM OF HEAD OF PANCREAS (HCC): ICD-10-CM

## 2019-05-09 PROCEDURE — 84443 ASSAY THYROID STIM HORMONE: CPT

## 2019-05-09 PROCEDURE — 84439 ASSAY OF FREE THYROXINE: CPT

## 2019-05-09 PROCEDURE — 82378 CARCINOEMBRYONIC ANTIGEN: CPT

## 2019-05-09 PROCEDURE — 83036 HEMOGLOBIN GLYCOSYLATED A1C: CPT

## 2019-05-09 PROCEDURE — 36415 COLL VENOUS BLD VENIPUNCTURE: CPT

## 2019-05-09 PROCEDURE — 80053 COMPREHEN METABOLIC PANEL: CPT

## 2019-05-09 PROCEDURE — 85025 COMPLETE CBC W/AUTO DIFF WBC: CPT

## 2019-05-09 PROCEDURE — 86301 IMMUNOASSAY TUMOR CA 19-9: CPT

## 2019-06-05 ENCOUNTER — HOSPITAL ENCOUNTER (EMERGENCY)
Facility: HOSPITAL | Age: 79
Discharge: HOME | End: 2019-06-05
Attending: EMERGENCY MEDICINE
Payer: MEDICARE

## 2019-06-05 ENCOUNTER — APPOINTMENT (OUTPATIENT)
Dept: GENERAL RADIOLOGY | Facility: HOSPITAL | Age: 79
End: 2019-06-05
Attending: NURSE PRACTITIONER
Payer: MEDICARE

## 2019-06-05 VITALS
SYSTOLIC BLOOD PRESSURE: 139 MMHG | OXYGEN SATURATION: 96 % | TEMPERATURE: 98 F | RESPIRATION RATE: 13 BRPM | HEART RATE: 66 BPM | DIASTOLIC BLOOD PRESSURE: 73 MMHG

## 2019-06-05 DIAGNOSIS — R53.83 FATIGUE, UNSPECIFIED TYPE: Primary | ICD-10-CM

## 2019-06-05 PROCEDURE — 99285 EMERGENCY DEPT VISIT HI MDM: CPT

## 2019-06-05 PROCEDURE — 36415 COLL VENOUS BLD VENIPUNCTURE: CPT

## 2019-06-05 PROCEDURE — 83735 ASSAY OF MAGNESIUM: CPT | Performed by: NURSE PRACTITIONER

## 2019-06-05 PROCEDURE — 93005 ELECTROCARDIOGRAM TRACING: CPT

## 2019-06-05 PROCEDURE — 80053 COMPREHEN METABOLIC PANEL: CPT | Performed by: NURSE PRACTITIONER

## 2019-06-05 PROCEDURE — 99284 EMERGENCY DEPT VISIT MOD MDM: CPT

## 2019-06-05 PROCEDURE — 82962 GLUCOSE BLOOD TEST: CPT

## 2019-06-05 PROCEDURE — 84484 ASSAY OF TROPONIN QUANT: CPT | Performed by: NURSE PRACTITIONER

## 2019-06-05 PROCEDURE — 84443 ASSAY THYROID STIM HORMONE: CPT | Performed by: EMERGENCY MEDICINE

## 2019-06-05 PROCEDURE — 81003 URINALYSIS AUTO W/O SCOPE: CPT | Performed by: NURSE PRACTITIONER

## 2019-06-05 PROCEDURE — 85025 COMPLETE CBC W/AUTO DIFF WBC: CPT | Performed by: NURSE PRACTITIONER

## 2019-06-05 PROCEDURE — 83690 ASSAY OF LIPASE: CPT | Performed by: NURSE PRACTITIONER

## 2019-06-05 PROCEDURE — 93010 ELECTROCARDIOGRAM REPORT: CPT

## 2019-06-05 PROCEDURE — 71045 X-RAY EXAM CHEST 1 VIEW: CPT | Performed by: NURSE PRACTITIONER

## 2019-06-05 RX ORDER — LISINOPRIL AND HYDROCHLOROTHIAZIDE 20; 12.5 MG/1; MG/1
1 TABLET ORAL DAILY
Status: ON HOLD | COMMUNITY
End: 2019-11-25

## 2019-06-05 NOTE — ED INITIAL ASSESSMENT (HPI)
Pt here with increasing fatigue over the last few days, epigastric pain radiating through to upper back, and complaints of unsteady gait. Denies any n/v/d/fever. Breathing is unlabored, even, regular, without any signs of respiratory distress.  Patient has

## 2019-06-05 NOTE — ED PROVIDER NOTES
Patient Seen in: [facility] Emergency Department    History   Patient presents with:  Fatigue (constitutional, neurologic)    Stated Complaint: fatigue    69-year-old male presents today with complaints of generalized fatigue worsening over the last cardiac implants and grafts    • Prostatitis    • Renal disorder    • Skin cancer    • Sputum production    • Stress    • Thyroid disease    • Type II or unspecified type diabetes mellitus without mention of complication, not stated as uncontrolled    • Un Physical Exam   Constitutional: He is oriented to person, place, and time. He appears well-developed and well-nourished. No distress. HENT:   Head: Normocephalic. Eyes: Pupils are equal, round, and reactive to light.  Conjunctivae are normal. ------                     CBC W/ DIFFERENTIAL[[id number]]                              Final result                 Please view results for these tests on the individual orders.    RAINBOW DRAW BLUE   RAINBOW DRAW LAVENDER   RAINBOW DRAW LIGHT GREEN patient can be discharged home with close follow-up in the office. Dr. Bryanna Parker did discuss results and plan of care with patient and family who both verbalized understanding agree with plan of care.             Disposition and Plan     Clinical Impression:

## 2019-06-10 PROCEDURE — 82710 FATS/LIPIDS FECES QUANT: CPT

## 2019-06-11 PROCEDURE — 82710 FATS/LIPIDS FECES QUANT: CPT

## 2019-06-12 ENCOUNTER — LAB ENCOUNTER (OUTPATIENT)
Dept: LAB | Facility: HOSPITAL | Age: 79
End: 2019-06-12
Attending: FAMILY MEDICINE
Payer: MEDICARE

## 2019-06-12 DIAGNOSIS — K90.9 STEATORRHEA: ICD-10-CM

## 2019-07-05 ENCOUNTER — OFFICE VISIT (OUTPATIENT)
Dept: HEMATOLOGY/ONCOLOGY | Facility: HOSPITAL | Age: 79
End: 2019-07-05
Attending: INTERNAL MEDICINE
Payer: MEDICARE

## 2019-07-05 VITALS
RESPIRATION RATE: 18 BRPM | HEIGHT: 72.01 IN | HEART RATE: 80 BPM | TEMPERATURE: 98 F | SYSTOLIC BLOOD PRESSURE: 129 MMHG | WEIGHT: 235 LBS | DIASTOLIC BLOOD PRESSURE: 65 MMHG | OXYGEN SATURATION: 93 % | BODY MASS INDEX: 31.83 KG/M2

## 2019-07-05 DIAGNOSIS — Z79.4 TYPE 2 DIABETES MELLITUS WITHOUT COMPLICATION, WITH LONG-TERM CURRENT USE OF INSULIN (HCC): ICD-10-CM

## 2019-07-05 DIAGNOSIS — C25.0 MALIGNANT NEOPLASM OF HEAD OF PANCREAS (HCC): Primary | ICD-10-CM

## 2019-07-05 DIAGNOSIS — Z77.22 DAILY EXPOSURE TO TOBACCO SMOKE: ICD-10-CM

## 2019-07-05 DIAGNOSIS — E11.9 TYPE 2 DIABETES MELLITUS WITHOUT COMPLICATION, WITH LONG-TERM CURRENT USE OF INSULIN (HCC): ICD-10-CM

## 2019-07-05 LAB
ALBUMIN SERPL-MCNC: 3.4 G/DL (ref 3.4–5)
ALBUMIN/GLOB SERPL: 0.8 {RATIO} (ref 1–2)
ALP LIVER SERPL-CCNC: 82 U/L (ref 45–117)
ALT SERPL-CCNC: 31 U/L (ref 16–61)
ANION GAP SERPL CALC-SCNC: 6 MMOL/L (ref 0–18)
AST SERPL-CCNC: 22 U/L (ref 15–37)
BASOPHILS # BLD AUTO: 0.02 X10(3) UL (ref 0–0.2)
BASOPHILS NFR BLD AUTO: 0.3 %
BILIRUB SERPL-MCNC: 0.4 MG/DL (ref 0.1–2)
BUN BLD-MCNC: 31 MG/DL (ref 7–18)
BUN/CREAT SERPL: 16.7 (ref 10–20)
CALCIUM BLD-MCNC: 9.1 MG/DL (ref 8.5–10.1)
CANCER AG19-9 SERPL-ACNC: 223.1 U/ML (ref ?–37)
CEA SERPL-MCNC: 1.2 NG/ML (ref ?–5)
CHLORIDE SERPL-SCNC: 100 MMOL/L (ref 98–112)
CO2 SERPL-SCNC: 27 MMOL/L (ref 21–32)
CREAT BLD-MCNC: 1.86 MG/DL (ref 0.7–1.3)
DEPRECATED RDW RBC AUTO: 45.9 FL (ref 35.1–46.3)
EOSINOPHIL # BLD AUTO: 0.16 X10(3) UL (ref 0–0.7)
EOSINOPHIL NFR BLD AUTO: 2.7 %
ERYTHROCYTE [DISTWIDTH] IN BLOOD BY AUTOMATED COUNT: 12.8 % (ref 11–15)
GLOBULIN PLAS-MCNC: 4.5 G/DL (ref 2.8–4.4)
GLUCOSE BLD-MCNC: 381 MG/DL (ref 70–99)
HCT VFR BLD AUTO: 44.5 % (ref 39–53)
HGB BLD-MCNC: 14.4 G/DL (ref 13–17.5)
IMM GRANULOCYTES # BLD AUTO: 0.02 X10(3) UL (ref 0–1)
IMM GRANULOCYTES NFR BLD: 0.3 %
LYMPHOCYTES # BLD AUTO: 1.65 X10(3) UL (ref 1–4)
LYMPHOCYTES NFR BLD AUTO: 27.5 %
M PROTEIN MFR SERPL ELPH: 7.9 G/DL (ref 6.4–8.2)
MCH RBC QN AUTO: 31.9 PG (ref 26–34)
MCHC RBC AUTO-ENTMCNC: 32.4 G/DL (ref 31–37)
MCV RBC AUTO: 98.5 FL (ref 80–100)
MONOCYTES # BLD AUTO: 0.53 X10(3) UL (ref 0.1–1)
MONOCYTES NFR BLD AUTO: 8.8 %
NEUTROPHILS # BLD AUTO: 3.63 X10 (3) UL (ref 1.5–7.7)
NEUTROPHILS # BLD AUTO: 3.63 X10(3) UL (ref 1.5–7.7)
NEUTROPHILS NFR BLD AUTO: 60.4 %
OSMOLALITY SERPL CALC.SUM OF ELEC: 298 MOSM/KG (ref 275–295)
PLATELET # BLD AUTO: 214 10(3)UL (ref 150–450)
POTASSIUM SERPL-SCNC: 4.8 MMOL/L (ref 3.5–5.1)
RBC # BLD AUTO: 4.52 X10(6)UL (ref 3.8–5.8)
SODIUM SERPL-SCNC: 133 MMOL/L (ref 136–145)
WBC # BLD AUTO: 6 X10(3) UL (ref 4–11)

## 2019-07-05 PROCEDURE — 99214 OFFICE O/P EST MOD 30 MIN: CPT | Performed by: INTERNAL MEDICINE

## 2019-07-05 RX ORDER — VENLAFAXINE HYDROCHLORIDE 150 MG/1
150 CAPSULE, EXTENDED RELEASE ORAL DAILY
Qty: 30 CAPSULE | Refills: 5 | Status: SHIPPED | OUTPATIENT
Start: 2019-07-05 | End: 2020-01-27

## 2019-07-05 RX ORDER — ESCITALOPRAM OXALATE 10 MG/1
10 TABLET ORAL DAILY
Qty: 30 TABLET | Refills: 0 | Status: SHIPPED | OUTPATIENT
Start: 2019-07-05 | End: 2019-07-05

## 2019-07-05 RX ORDER — PANCRELIPASE 24000; 76000; 120000 [USP'U]/1; [USP'U]/1; [USP'U]/1
3 CAPSULE, DELAYED RELEASE PELLETS ORAL
Refills: 1 | Status: ON HOLD | COMMUNITY
Start: 2019-06-21 | End: 2019-12-19

## 2019-07-05 RX ORDER — PANTOPRAZOLE SODIUM 40 MG/1
40 TABLET, DELAYED RELEASE ORAL DAILY
Qty: 30 TABLET | Refills: 11 | Status: SHIPPED | OUTPATIENT
Start: 2019-07-05 | End: 2020-04-28

## 2019-07-05 RX ORDER — TRAMADOL HYDROCHLORIDE 50 MG/1
50 TABLET ORAL 4 TIMES DAILY PRN
Status: ON HOLD | COMMUNITY
Start: 2019-04-19 | End: 2019-12-10

## 2019-07-05 NOTE — PROGRESS NOTES
MD f/u for pancreatic ca. Here with daughter. States last month patient was very fatigued and weak. Recently started Creon approx 10 days ago and has been feeling better since. Currently on tramadol at night d/t bilat torn rotator cuff.  Continues to have U

## 2019-07-08 LAB
EST. AVERAGE GLUCOSE BLD GHB EST-MCNC: 220 MG/DL (ref 68–126)
HBA1C MFR BLD HPLC: 9.3 % (ref ?–5.7)

## 2019-07-09 NOTE — PROGRESS NOTES
[facility]    PATIENT'S NAME: Paguate Parul   ATTENDING PHYSICIAN: Kimberly Allen M.D.    PATIENT ACCOUNT #: [de-identified] LOCATION: [address] [place] [address] RECORD #: [id number] YOB: 1940   DATE OF SERVICE: 07/05/2019       CANCER CE wife who is also ill, and his daughter is managing most of his medications.   His current medications include Tylenol, alprazolam p.r.n., B complex vitamins, calcium, vitamin D, cetirizine 10 mg daily, Creon 24,000 units 1 capsule t.i.d. with meals, Humalog FOLFOX or liposomal irinotecan regimen. Both of these are likely to result in some responses in this setting. We need to establish the extent of his disease at the present time.   He is already about 1 year out from diagnosis and with locally advanced dis

## 2019-07-17 ENCOUNTER — HOSPITAL ENCOUNTER (OUTPATIENT)
Dept: CT IMAGING | Facility: HOSPITAL | Age: 79
Discharge: HOME | End: 2019-07-17
Attending: INTERNAL MEDICINE
Payer: MEDICARE

## 2019-07-17 DIAGNOSIS — C25.0 MALIGNANT NEOPLASM OF HEAD OF PANCREAS (HCC): ICD-10-CM

## 2019-07-17 PROCEDURE — 71260 CT THORAX DX C+: CPT | Performed by: INTERNAL MEDICINE

## 2019-07-17 PROCEDURE — 74177 CT ABD & PELVIS W/CONTRAST: CPT | Performed by: INTERNAL MEDICINE

## 2019-07-30 ENCOUNTER — HOSPITAL ENCOUNTER (OUTPATIENT)
Dept: CT IMAGING | Age: 79
Discharge: HOME | End: 2019-07-30
Attending: FAMILY MEDICINE
Payer: MEDICARE

## 2019-07-30 DIAGNOSIS — C25.0 MALIGNANT NEOPLASM OF HEAD OF PANCREAS (HCC): ICD-10-CM

## 2019-07-30 DIAGNOSIS — R94.31 ABNORMAL EKG: ICD-10-CM

## 2019-07-30 DIAGNOSIS — R06.00 DOE (DYSPNEA ON EXERTION): ICD-10-CM

## 2019-07-30 PROCEDURE — 71275 CT ANGIOGRAPHY CHEST: CPT | Performed by: FAMILY MEDICINE

## 2019-08-13 ENCOUNTER — OFFICE VISIT (OUTPATIENT)
Dept: HEMATOLOGY/ONCOLOGY | Facility: HOSPITAL | Age: 79
End: 2019-08-13
Attending: INTERNAL MEDICINE
Payer: MEDICARE

## 2019-08-13 VITALS
BODY MASS INDEX: 31.15 KG/M2 | RESPIRATION RATE: 18 BRPM | OXYGEN SATURATION: 93 % | TEMPERATURE: 99 F | SYSTOLIC BLOOD PRESSURE: 136 MMHG | WEIGHT: 230 LBS | HEART RATE: 87 BPM | DIASTOLIC BLOOD PRESSURE: 76 MMHG | HEIGHT: 72.01 IN

## 2019-08-13 DIAGNOSIS — E11.9 TYPE 2 DIABETES MELLITUS WITHOUT COMPLICATION, WITHOUT LONG-TERM CURRENT USE OF INSULIN (HCC): ICD-10-CM

## 2019-08-13 DIAGNOSIS — C25.0 MALIGNANT NEOPLASM OF HEAD OF PANCREAS (HCC): Primary | ICD-10-CM

## 2019-08-13 LAB
ALBUMIN SERPL-MCNC: 3.6 G/DL (ref 3.4–5)
ALBUMIN/GLOB SERPL: 0.8 {RATIO} (ref 1–2)
ALP LIVER SERPL-CCNC: 83 U/L (ref 45–117)
ALT SERPL-CCNC: 22 U/L (ref 16–61)
ANION GAP SERPL CALC-SCNC: 8 MMOL/L (ref 0–18)
AST SERPL-CCNC: 12 U/L (ref 15–37)
BASOPHILS # BLD AUTO: 0.03 X10(3) UL (ref 0–0.2)
BASOPHILS NFR BLD AUTO: 0.5 %
BILIRUB SERPL-MCNC: 0.5 MG/DL (ref 0.1–2)
BUN BLD-MCNC: 26 MG/DL (ref 7–18)
BUN/CREAT SERPL: 13.2 (ref 10–20)
CALCIUM BLD-MCNC: 9.3 MG/DL (ref 8.5–10.1)
CANCER AG19-9 SERPL-ACNC: 304.8 U/ML (ref ?–37)
CEA SERPL-MCNC: 1.4 NG/ML (ref ?–5)
CHLORIDE SERPL-SCNC: 104 MMOL/L (ref 98–112)
CO2 SERPL-SCNC: 27 MMOL/L (ref 21–32)
CREAT BLD-MCNC: 1.97 MG/DL (ref 0.7–1.3)
DEPRECATED RDW RBC AUTO: 46 FL (ref 35.1–46.3)
EOSINOPHIL # BLD AUTO: 0.11 X10(3) UL (ref 0–0.7)
EOSINOPHIL NFR BLD AUTO: 1.9 %
ERYTHROCYTE [DISTWIDTH] IN BLOOD BY AUTOMATED COUNT: 12.6 % (ref 11–15)
EST. AVERAGE GLUCOSE BLD GHB EST-MCNC: 194 MG/DL (ref 68–126)
GLOBULIN PLAS-MCNC: 4.7 G/DL (ref 2.8–4.4)
GLUCOSE BLD-MCNC: 295 MG/DL (ref 70–99)
HBA1C MFR BLD HPLC: 8.4 % (ref ?–5.7)
HCT VFR BLD AUTO: 46.4 % (ref 39–53)
HGB BLD-MCNC: 14.9 G/DL (ref 13–17.5)
IMM GRANULOCYTES # BLD AUTO: 0.02 X10(3) UL (ref 0–1)
IMM GRANULOCYTES NFR BLD: 0.3 %
LYMPHOCYTES # BLD AUTO: 1.1 X10(3) UL (ref 1–4)
LYMPHOCYTES NFR BLD AUTO: 18.8 %
M PROTEIN MFR SERPL ELPH: 8.3 G/DL (ref 6.4–8.2)
MCH RBC QN AUTO: 31.9 PG (ref 26–34)
MCHC RBC AUTO-ENTMCNC: 32.1 G/DL (ref 31–37)
MCV RBC AUTO: 99.4 FL (ref 80–100)
MONOCYTES # BLD AUTO: 0.39 X10(3) UL (ref 0.1–1)
MONOCYTES NFR BLD AUTO: 6.7 %
NEUTROPHILS # BLD AUTO: 4.21 X10 (3) UL (ref 1.5–7.7)
NEUTROPHILS # BLD AUTO: 4.21 X10(3) UL (ref 1.5–7.7)
NEUTROPHILS NFR BLD AUTO: 71.8 %
OSMOLALITY SERPL CALC.SUM OF ELEC: 304 MOSM/KG (ref 275–295)
PLATELET # BLD AUTO: 227 10(3)UL (ref 150–450)
POTASSIUM SERPL-SCNC: 4.4 MMOL/L (ref 3.5–5.1)
RBC # BLD AUTO: 4.67 X10(6)UL (ref 3.8–5.8)
SODIUM SERPL-SCNC: 139 MMOL/L (ref 136–145)
WBC # BLD AUTO: 5.9 X10(3) UL (ref 4–11)

## 2019-08-13 NOTE — PROGRESS NOTES
Patient is here for MD f/u for pancreatic cancer. Patient has lost 5 lbs this month. Not much of an appetite. On Creon with meals. Mld pain in left side of chest. Nipple is sensitive to touch. Patient is taking Tramadol as needed. SOB with exertion.  Denies

## 2019-10-14 ENCOUNTER — PATIENT MESSAGE (OUTPATIENT)
Dept: HEMATOLOGY/ONCOLOGY | Facility: HOSPITAL | Age: 79
End: 2019-10-14

## 2019-10-14 DIAGNOSIS — C25.0 MALIGNANT NEOPLASM OF HEAD OF PANCREAS (HCC): Primary | ICD-10-CM

## 2019-10-15 NOTE — TELEPHONE ENCOUNTER
From: Andreia Wang  To: Waleska Shabazz MD  Sent: 10/14/2019 5:33 PM CDT  Subject: Other    Dr. Deacon Walker:  Mahsa Booth) is scheduled for his scan this week. Is the order in the system for all his blood work -  etc. too?  Since we are in

## 2019-10-18 ENCOUNTER — HOSPITAL ENCOUNTER (OUTPATIENT)
Dept: CT IMAGING | Facility: HOSPITAL | Age: 79
Discharge: HOME | End: 2019-10-18
Attending: INTERNAL MEDICINE
Payer: MEDICARE

## 2019-10-18 ENCOUNTER — LAB ENCOUNTER (OUTPATIENT)
Dept: LAB | Facility: HOSPITAL | Age: 79
End: 2019-10-18
Attending: INTERNAL MEDICINE
Payer: MEDICARE

## 2019-10-18 DIAGNOSIS — C25.0 MALIGNANT NEOPLASM OF HEAD OF PANCREAS (HCC): ICD-10-CM

## 2019-10-18 PROCEDURE — 74177 CT ABD & PELVIS W/CONTRAST: CPT | Performed by: INTERNAL MEDICINE

## 2019-10-18 PROCEDURE — 80053 COMPREHEN METABOLIC PANEL: CPT

## 2019-10-18 PROCEDURE — 85025 COMPLETE CBC W/AUTO DIFF WBC: CPT

## 2019-10-18 PROCEDURE — 71260 CT THORAX DX C+: CPT | Performed by: INTERNAL MEDICINE

## 2019-10-18 PROCEDURE — 86301 IMMUNOASSAY TUMOR CA 19-9: CPT

## 2019-10-18 PROCEDURE — 82378 CARCINOEMBRYONIC ANTIGEN: CPT

## 2019-10-18 PROCEDURE — 36415 COLL VENOUS BLD VENIPUNCTURE: CPT

## 2019-10-25 ENCOUNTER — OFFICE VISIT (OUTPATIENT)
Dept: HEMATOLOGY/ONCOLOGY | Facility: HOSPITAL | Age: 79
End: 2019-10-25
Attending: INTERNAL MEDICINE
Payer: MEDICARE

## 2019-10-25 VITALS
OXYGEN SATURATION: 92 % | RESPIRATION RATE: 16 BRPM | DIASTOLIC BLOOD PRESSURE: 67 MMHG | SYSTOLIC BLOOD PRESSURE: 123 MMHG | HEART RATE: 87 BPM | HEIGHT: 72.1 IN | WEIGHT: 227 LBS | TEMPERATURE: 98 F | BODY MASS INDEX: 30.75 KG/M2

## 2019-10-25 DIAGNOSIS — Z79.4 TYPE 2 DIABETES MELLITUS WITHOUT COMPLICATION, WITH LONG-TERM CURRENT USE OF INSULIN (HCC): ICD-10-CM

## 2019-10-25 DIAGNOSIS — C78.7 SECONDARY ADENOCARCINOMA OF LIVER (HCC): ICD-10-CM

## 2019-10-25 DIAGNOSIS — E11.9 TYPE 2 DIABETES MELLITUS WITHOUT COMPLICATION, WITH LONG-TERM CURRENT USE OF INSULIN (HCC): ICD-10-CM

## 2019-10-25 DIAGNOSIS — C25.9 MALIGNANT NEOPLASM OF PANCREAS, UNSPECIFIED LOCATION OF MALIGNANCY (HCC): Primary | ICD-10-CM

## 2019-10-25 PROCEDURE — 99214 OFFICE O/P EST MOD 30 MIN: CPT | Performed by: INTERNAL MEDICINE

## 2019-10-25 RX ORDER — LIDOCAINE AND PRILOCAINE 25; 25 MG/G; MG/G
CREAM TOPICAL
Qty: 1 TUBE | Refills: 0 | Status: ON HOLD | OUTPATIENT
Start: 2019-10-25 | End: 2019-12-13

## 2019-10-25 RX ORDER — IBUPROFEN 200 MG
200 TABLET ORAL EVERY 6 HOURS PRN
Status: ON HOLD | COMMUNITY
End: 2019-12-10

## 2019-10-25 NOTE — PROGRESS NOTES
Patient is here for MD f/katie for pancreatic cancer. He had a CT scan and labs on 10/18. Here to discuss results. Patient reports his appetite has decreased in the last 2-3 weeks. He has a constant upset stomach. Patient is moving his bowels ok.  Patient was t

## 2019-10-28 ENCOUNTER — TELEPHONE (OUTPATIENT)
Dept: PHYSICAL THERAPY | Age: 79
End: 2019-10-28

## 2019-10-28 PROBLEM — C78.7 SECONDARY ADENOCARCINOMA OF LIVER (HCC): Status: ACTIVE | Noted: 2019-10-28

## 2019-10-29 NOTE — PROGRESS NOTES
[facility]    PATIENT'S NAME: Jose Cruz Connorco   ATTENDING PHYSICIAN: Amanda Carty M.D.    PATIENT ACCOUNT #: [de-identified] LOCATION: [address] [place] [address] RECORD #: [id number] YOB: 1940   DATE OF SERVICE: 10/25/2019       CANCER CE reasonably well. He was taken off his Flomax due to low blood pressure, but now he has urinary frequency and we are going to take him off his losartan/hydrochlorothiazide and go back on the Flomax. He is fatigued.   He has been more short of breath with e stable, but elevated, at 1.72. IMPRESSION:  Pancreatic cancer. Now with local mild progression and a small volume of metastatic disease in the liver. We talked about options for him.   Systemic treatment options are going to be difficult given his po

## 2019-11-07 ENCOUNTER — TELEPHONE (OUTPATIENT)
Dept: ENDOCRINOLOGY CLINIC | Facility: CLINIC | Age: 79
End: 2019-11-07

## 2019-11-07 ENCOUNTER — DIABETIC EDUCATION (OUTPATIENT)
Dept: ENDOCRINOLOGY CLINIC | Facility: CLINIC | Age: 79
End: 2019-11-07
Payer: MEDICARE

## 2019-11-07 DIAGNOSIS — E11.65 TYPE 2 DIABETES MELLITUS WITH HYPERGLYCEMIA, WITH LONG-TERM CURRENT USE OF INSULIN (HCC): Primary | ICD-10-CM

## 2019-11-07 DIAGNOSIS — Z79.4 TYPE 2 DIABETES MELLITUS WITH HYPERGLYCEMIA, WITH LONG-TERM CURRENT USE OF INSULIN (HCC): Primary | ICD-10-CM

## 2019-11-07 PROCEDURE — G0108 DIAB MANAGE TRN  PER INDIV: HCPCS | Performed by: DIETITIAN, REGISTERED

## 2019-11-07 NOTE — PROGRESS NOTES
Vicky Lui  11/3/1940 was seen for Initial Personal Lisbeth Continuous Glucose Sensor Instruction:    11/7/2019  Start time: .12:45 End time: 1:45   Referring Provider: Rohan    Pt has metastatic pancreatic CA and qualifies for Medicare covera

## 2019-11-07 NOTE — TELEPHONE ENCOUNTER
Lisbeth cover sheet, demographic info and copy of insurance cards emailed to [employer] at [email or web address]  Pt qualifies for medicare to cover his Jake Mayo.   Flagged for [facility] Nov 18th to check with Edmar.

## 2019-11-11 ENCOUNTER — APPOINTMENT (OUTPATIENT)
Dept: PHYSICAL THERAPY | Age: 79
End: 2019-11-11
Attending: FAMILY MEDICINE
Payer: MEDICARE

## 2019-11-11 ENCOUNTER — DIETICIAN VISIT (OUTPATIENT)
Dept: HEMATOLOGY/ONCOLOGY | Facility: HOSPITAL | Age: 79
End: 2019-11-11

## 2019-11-11 NOTE — PROGRESS NOTES
Nutrition screen complete as triggered by [facility] dx of pancreatic cancer. Chart reviewed. Noted pt being followed by RD at diabetes clinic. Noted daughter is an MD. Pt appears nutritionally stable at present. RD available on consult.

## 2019-11-13 ENCOUNTER — APPOINTMENT (OUTPATIENT)
Dept: PHYSICAL THERAPY | Age: 79
End: 2019-11-13
Attending: FAMILY MEDICINE
Payer: MEDICARE

## 2019-11-18 ENCOUNTER — TELEPHONE (OUTPATIENT)
Dept: HEMATOLOGY/ONCOLOGY | Facility: HOSPITAL | Age: 79
End: 2019-11-18

## 2019-11-18 ENCOUNTER — OFFICE VISIT (OUTPATIENT)
Dept: PHYSICAL THERAPY | Age: 79
End: 2019-11-18
Attending: FAMILY MEDICINE
Payer: MEDICARE

## 2019-11-18 PROCEDURE — 97162 PT EVAL MOD COMPLEX 30 MIN: CPT

## 2019-11-18 NOTE — PROGRESS NOTES
FALL SCREEN EVALUATION   Referring Physician: Dr. Mcgee ref.  provider found  Diagnosis: decreased strength, mildly increased fall risk, pancreatic CA    Date of Service: 11/18/2019     PATIENT Quique Ferrari is a [de-identified]year old male who presents to strength and endurance. Pt and PT discussed evaluation findings, pathology, POC and HEP. Pt voiced understanding and performs HEP correctly without reported pain.  Skilled Physical Therapy is medically necessary to address the above impairments and reach f to demo 10 times sit to stand with 30 sec timed sit to stand  · Pt will improve functional hip strength to demonstrate ability to ascend/descend 1 flight of stairs reciprocally without use of handrail  · Pt will be independent and compliant with comprehens

## 2019-11-18 NOTE — TELEPHONE ENCOUNTER
Spoke with dtr [facility] today. We received the following email from Lizbeth Huizar at [place]. Have called pcp office twice today to talk with them about getting pcp enrolled as per email below.  Was advised to contact  Crisotbal Sarah tomorrow to discuss with

## 2019-11-18 NOTE — TELEPHONE ENCOUNTER
Spoke with daughter Elfrieda Riedel - having some increase in lower abd pain. Has been taking one tramadol and one xanax at night and sleeping well. Suggested he take more tramadol during the day - either twice or three times a day and report back if not better.

## 2019-11-18 NOTE — TELEPHONE ENCOUNTER
Spoke with daughter- states pt has always had on/off dull abdominal discomfort since diagnoses, but within the last 1-2 weeks pt has been experiencing a continuous ache across his lower abdomen. Denies fever or N/V/C/D.  Reports that the pt has not been eat

## 2019-11-19 DIAGNOSIS — C25.0 MALIGNANT NEOPLASM OF HEAD OF PANCREAS (HCC): Primary | ICD-10-CM

## 2019-11-19 NOTE — TELEPHONE ENCOUNTER
Spoke with Dr Anel Enamorado billing person, Delicia. She said that Dr Mehnaz Jim is, in fact a [facility] provider. I forwarded the email (from Alan at [place]) to Delicia at [email or web address] and she will discuss with  Jamie Torres and let us know what to do

## 2019-11-20 ENCOUNTER — APPOINTMENT (OUTPATIENT)
Dept: PHYSICAL THERAPY | Age: 79
End: 2019-11-20
Attending: FAMILY MEDICINE
Payer: MEDICARE

## 2019-11-20 NOTE — TELEPHONE ENCOUNTER
Received email from Yoselin Murrieta at [place] asking for NPI of Dr Eileen Lee and he asked that I send that email to the provider relations email.     I called Aditya Hughes, the billing person at Dr Amaury Hernández office and she and Diann Erickson, the  think the probl

## 2019-11-21 NOTE — TELEPHONE ENCOUNTER
Emailed pt's his demographic page and copies of his insurance cards to [place].    Faxed over a [employer] order form for Toro Wilson to Dr Palma Joiner office (fax [phone]-) fax [phone] and [place] should be getting that back as soon as they can get him to sign it but not

## 2019-11-24 ENCOUNTER — APPOINTMENT (OUTPATIENT)
Dept: GENERAL RADIOLOGY | Facility: HOSPITAL | Age: 79
DRG: 919 | End: 2019-11-24
Attending: EMERGENCY MEDICINE
Payer: MEDICARE

## 2019-11-24 ENCOUNTER — APPOINTMENT (OUTPATIENT)
Dept: CT IMAGING | Facility: HOSPITAL | Age: 79
DRG: 919 | End: 2019-11-24
Attending: EMERGENCY MEDICINE
Payer: MEDICARE

## 2019-11-24 ENCOUNTER — HOSPITAL ENCOUNTER (INPATIENT)
Facility: HOSPITAL | Age: 79
LOS: 16 days | Discharge: NURSING, REHAB OR LONG-TERM CARE | DRG: 919 | End: 2019-12-10
Attending: EMERGENCY MEDICINE | Admitting: HOSPITALIST
Payer: MEDICARE

## 2019-11-24 DIAGNOSIS — K59.00 CONSTIPATION, UNSPECIFIED CONSTIPATION TYPE: ICD-10-CM

## 2019-11-24 DIAGNOSIS — E11.65 UNCONTROLLED TYPE 2 DIABETES MELLITUS WITH HYPERGLYCEMIA (HCC): ICD-10-CM

## 2019-11-24 DIAGNOSIS — R11.2 NON-INTRACTABLE VOMITING WITH NAUSEA, UNSPECIFIED VOMITING TYPE: ICD-10-CM

## 2019-11-24 DIAGNOSIS — M75.122 COMPLETE TEAR OF LEFT ROTATOR CUFF: ICD-10-CM

## 2019-11-24 DIAGNOSIS — B37.0 ORAL CANDIDIASIS: ICD-10-CM

## 2019-11-24 DIAGNOSIS — C25.9 MALIGNANT NEOPLASM OF PANCREAS, UNSPECIFIED LOCATION OF MALIGNANCY (HCC): ICD-10-CM

## 2019-11-24 DIAGNOSIS — M17.12 OSTEOARTHROSIS, LOCALIZED, PRIMARY, KNEE, LEFT: ICD-10-CM

## 2019-11-24 DIAGNOSIS — R79.89 ELEVATED LFTS: ICD-10-CM

## 2019-11-24 DIAGNOSIS — D63.0 ANEMIA COMPLICATING NEOPLASTIC DISEASE: ICD-10-CM

## 2019-11-24 DIAGNOSIS — R10.9 ABDOMINAL PAIN, ACUTE: Primary | ICD-10-CM

## 2019-11-24 DIAGNOSIS — Z96.642 S/P HIP REPLACEMENT, LEFT: ICD-10-CM

## 2019-11-24 PROBLEM — D69.6 THROMBOCYTOPENIA (HCC): Status: ACTIVE | Noted: 2019-11-24

## 2019-11-24 PROCEDURE — 99223 1ST HOSP IP/OBS HIGH 75: CPT | Performed by: HOSPITALIST

## 2019-11-24 PROCEDURE — 71046 X-RAY EXAM CHEST 2 VIEWS: CPT | Performed by: EMERGENCY MEDICINE

## 2019-11-24 PROCEDURE — 74177 CT ABD & PELVIS W/CONTRAST: CPT | Performed by: EMERGENCY MEDICINE

## 2019-11-24 RX ORDER — ACETAMINOPHEN 325 MG/1
650 TABLET ORAL EVERY 6 HOURS PRN
Status: DISCONTINUED | OUTPATIENT
Start: 2019-11-24 | End: 2019-12-10

## 2019-11-24 RX ORDER — POLYETHYLENE GLYCOL 3350 17 G/17G
17 POWDER, FOR SOLUTION ORAL DAILY PRN
Status: DISCONTINUED | OUTPATIENT
Start: 2019-11-24 | End: 2019-12-05

## 2019-11-24 RX ORDER — ACETAMINOPHEN, ASPIRIN AND CAFFEINE 250; 250; 65 MG/1; MG/1; MG/1
1 TABLET, FILM COATED ORAL ONCE
Status: DISCONTINUED | OUTPATIENT
Start: 2019-11-24 | End: 2019-11-24

## 2019-11-24 RX ORDER — ACETAMINOPHEN 500 MG
1000 TABLET ORAL ONCE
Status: DISCONTINUED | OUTPATIENT
Start: 2019-11-24 | End: 2019-11-24

## 2019-11-24 RX ORDER — SODIUM PHOSPHATE, DIBASIC AND SODIUM PHOSPHATE, MONOBASIC 7; 19 G/133ML; G/133ML
1 ENEMA RECTAL ONCE AS NEEDED
Status: DISCONTINUED | OUTPATIENT
Start: 2019-11-24 | End: 2019-12-10

## 2019-11-24 RX ORDER — ACETAMINOPHEN, ASPIRIN AND CAFFEINE 250; 250; 65 MG/1; MG/1; MG/1
1 TABLET, FILM COATED ORAL ONCE
Status: COMPLETED | OUTPATIENT
Start: 2019-11-24 | End: 2019-11-24

## 2019-11-24 RX ORDER — SODIUM CHLORIDE 9 MG/ML
1000 INJECTION, SOLUTION INTRAVENOUS ONCE
Status: COMPLETED | OUTPATIENT
Start: 2019-11-24 | End: 2019-11-24

## 2019-11-24 RX ORDER — ALPRAZOLAM 0.5 MG/1
0.5 TABLET ORAL 2 TIMES DAILY PRN
Status: DISCONTINUED | OUTPATIENT
Start: 2019-11-24 | End: 2019-11-26

## 2019-11-24 RX ORDER — SODIUM CHLORIDE 9 MG/ML
INJECTION, SOLUTION INTRAVENOUS CONTINUOUS
Status: DISCONTINUED | OUTPATIENT
Start: 2019-11-24 | End: 2019-11-28

## 2019-11-24 RX ORDER — METOCLOPRAMIDE HYDROCHLORIDE 5 MG/ML
10 INJECTION INTRAMUSCULAR; INTRAVENOUS EVERY 8 HOURS PRN
Status: DISCONTINUED | OUTPATIENT
Start: 2019-11-24 | End: 2019-11-26 | Stop reason: SDUPTHER

## 2019-11-24 RX ORDER — HEPARIN SODIUM 5000 [USP'U]/ML
5000 INJECTION, SOLUTION INTRAVENOUS; SUBCUTANEOUS EVERY 8 HOURS SCHEDULED
Status: DISCONTINUED | OUTPATIENT
Start: 2019-11-24 | End: 2019-11-27

## 2019-11-24 RX ORDER — MORPHINE SULFATE 4 MG/ML
4 INJECTION, SOLUTION INTRAMUSCULAR; INTRAVENOUS EVERY 2 HOUR PRN
Status: DISCONTINUED | OUTPATIENT
Start: 2019-11-24 | End: 2019-11-26

## 2019-11-24 RX ORDER — LEVOTHYROXINE SODIUM 0.1 MG/1
100 TABLET ORAL
Status: DISCONTINUED | OUTPATIENT
Start: 2019-11-25 | End: 2019-12-10

## 2019-11-24 RX ORDER — ONDANSETRON 2 MG/ML
4 INJECTION INTRAMUSCULAR; INTRAVENOUS EVERY 4 HOURS PRN
Status: DISCONTINUED | OUTPATIENT
Start: 2019-11-24 | End: 2019-11-25

## 2019-11-24 RX ORDER — LISINOPRIL AND HYDROCHLOROTHIAZIDE 20; 12.5 MG/1; MG/1
1 TABLET ORAL DAILY
Status: DISCONTINUED | OUTPATIENT
Start: 2019-11-24 | End: 2019-11-24

## 2019-11-24 RX ORDER — DOCUSATE SODIUM 100 MG/1
100 CAPSULE, LIQUID FILLED ORAL 2 TIMES DAILY
Status: DISCONTINUED | OUTPATIENT
Start: 2019-11-24 | End: 2019-12-10

## 2019-11-24 RX ORDER — VENLAFAXINE HYDROCHLORIDE 75 MG/1
150 CAPSULE, EXTENDED RELEASE ORAL DAILY
Status: DISCONTINUED | OUTPATIENT
Start: 2019-11-24 | End: 2019-12-10

## 2019-11-24 RX ORDER — ALFUZOSIN HYDROCHLORIDE 10 MG/1
10 TABLET, EXTENDED RELEASE ORAL
Status: DISCONTINUED | OUTPATIENT
Start: 2019-11-25 | End: 2019-11-24

## 2019-11-24 RX ORDER — MORPHINE SULFATE 4 MG/ML
2 INJECTION, SOLUTION INTRAMUSCULAR; INTRAVENOUS EVERY 2 HOUR PRN
Status: DISCONTINUED | OUTPATIENT
Start: 2019-11-24 | End: 2019-11-26

## 2019-11-24 RX ORDER — ONDANSETRON 2 MG/ML
4 INJECTION INTRAMUSCULAR; INTRAVENOUS EVERY 6 HOURS PRN
Status: DISCONTINUED | OUTPATIENT
Start: 2019-11-24 | End: 2019-12-10

## 2019-11-24 RX ORDER — ALFUZOSIN HYDROCHLORIDE 10 MG/1
10 TABLET, EXTENDED RELEASE ORAL
Status: DISCONTINUED | OUTPATIENT
Start: 2019-11-24 | End: 2019-12-10

## 2019-11-24 RX ORDER — BISACODYL 10 MG
10 SUPPOSITORY, RECTAL RECTAL
Status: DISCONTINUED | OUTPATIENT
Start: 2019-11-24 | End: 2019-12-10

## 2019-11-24 RX ORDER — PANTOPRAZOLE SODIUM 40 MG/1
40 TABLET, DELAYED RELEASE ORAL
Status: DISCONTINUED | OUTPATIENT
Start: 2019-11-25 | End: 2019-11-27

## 2019-11-24 RX ORDER — SODIUM CHLORIDE 9 MG/ML
INJECTION, SOLUTION INTRAVENOUS CONTINUOUS
Status: ACTIVE | OUTPATIENT
Start: 2019-11-24 | End: 2019-11-25

## 2019-11-25 ENCOUNTER — ANESTHESIA (OUTPATIENT)
Dept: ENDOSCOPY | Facility: HOSPITAL | Age: 79
DRG: 919 | End: 2019-11-25
Payer: MEDICARE

## 2019-11-25 ENCOUNTER — ANESTHESIA EVENT (OUTPATIENT)
Dept: ENDOSCOPY | Facility: HOSPITAL | Age: 79
DRG: 919 | End: 2019-11-25
Payer: MEDICARE

## 2019-11-25 ENCOUNTER — APPOINTMENT (OUTPATIENT)
Dept: GENERAL RADIOLOGY | Facility: HOSPITAL | Age: 79
DRG: 919 | End: 2019-11-25
Attending: INTERNAL MEDICINE
Payer: MEDICARE

## 2019-11-25 PROCEDURE — 99233 SBSQ HOSP IP/OBS HIGH 50: CPT | Performed by: HOSPITALIST

## 2019-11-25 PROCEDURE — 0FC98ZZ EXTIRPATION OF MATTER FROM COMMON BILE DUCT, VIA NATURAL OR ARTIFICIAL OPENING ENDOSCOPIC: ICD-10-PCS | Performed by: INTERNAL MEDICINE

## 2019-11-25 PROCEDURE — 74328 X-RAY BILE DUCT ENDOSCOPY: CPT | Performed by: INTERNAL MEDICINE

## 2019-11-25 PROCEDURE — 0F778DZ DILATION OF COMMON HEPATIC DUCT WITH INTRALUMINAL DEVICE, VIA NATURAL OR ARTIFICIAL OPENING ENDOSCOPIC: ICD-10-PCS | Performed by: INTERNAL MEDICINE

## 2019-11-25 PROCEDURE — 99223 1ST HOSP IP/OBS HIGH 75: CPT | Performed by: INTERNAL MEDICINE

## 2019-11-25 DEVICE — STENT SYSTEM RMV
Type: IMPLANTABLE DEVICE | Status: FUNCTIONAL
Brand: WALLFLEX BILIARY

## 2019-11-25 RX ORDER — LISINOPRIL 40 MG/1
40 TABLET ORAL DAILY
Status: DISCONTINUED | OUTPATIENT
Start: 2019-11-25 | End: 2019-11-27

## 2019-11-25 RX ORDER — HYDRALAZINE HYDROCHLORIDE 20 MG/ML
10 INJECTION INTRAMUSCULAR; INTRAVENOUS EVERY 6 HOURS PRN
Status: DISCONTINUED | OUTPATIENT
Start: 2019-11-25 | End: 2019-12-10

## 2019-11-25 RX ORDER — LISINOPRIL 40 MG/1
40 TABLET ORAL DAILY
Refills: 0 | Status: ON HOLD | COMMUNITY
Start: 2019-11-07 | End: 2019-12-10

## 2019-11-25 RX ORDER — LEVOTHYROXINE SODIUM 0.1 MG/1
100 TABLET ORAL DAILY
Status: DISCONTINUED | OUTPATIENT
Start: 2019-11-25 | End: 2019-11-25

## 2019-11-25 RX ORDER — LEVOTHYROXINE SODIUM 0.1 MG/1
100 TABLET ORAL DAILY
Refills: 0 | COMMUNITY
Start: 2019-11-17 | End: 2020-03-13

## 2019-11-25 RX ORDER — LIDOCAINE AND PRILOCAINE 25; 25 MG/G; MG/G
CREAM TOPICAL ONCE
Status: COMPLETED | OUTPATIENT
Start: 2019-11-25 | End: 2019-11-25

## 2019-11-25 RX ORDER — DEXTROSE MONOHYDRATE 25 G/50ML
50 INJECTION, SOLUTION INTRAVENOUS
Status: DISCONTINUED | OUTPATIENT
Start: 2019-11-25 | End: 2019-11-27

## 2019-11-25 NOTE — ED NOTES
Verbal order received from Dr iDrk Oliva for Excedrin 1 tab po x 1 dose. Pt declined PO tylenol requesting excedrin for c/o headache.  Discussed with Dr Dirk Oliva

## 2019-11-25 NOTE — CONSULTS
[facility]                       Gastroenterology Consultation-[facility]    Gem Rise Patient Status:  Inpatient    11/3/1940 MRN [id number]   [facility] 4NW-A Attending Nano Fishman MD   [facility] Day # 1 PCP Liseth • Personal history of antineoplastic chemotherapy 09/2018   • PONV (postoperative nausea and vomiting)    • Presence of other cardiac implants and grafts    • Prostatitis    • Renal disorder    • Skin cancer    • Sputum production    • Stress    • Thyro 5,000 Units, 5,000 Units, Subcutaneous, Q8H DELMI  acetaminophen (TYLENOL) tab 650 mg, 650 mg, Oral, Q6H PRN  docusate sodium (COLACE) cap 100 mg, 100 mg, Oral, BID  PEG 3350 (MIRALAX) powder packet 17 g, 17 g, Oral, Daily PRN  magnesium hydroxide (MILK OF M Hematologic: The patient reports no easy bruising, frequent gum bleeding or nose bleeding;   The patient has no history of known chronic anemia            Dermatologic: The patient reports no recent rashes or chronic skin disorders            Rheumatologic: PTT 29.8 11/24/2019    INR 1.13 11/24/2019    PTP 15.0 11/24/2019    LIP 28 11/24/2019     Recent Labs   Lab 11/24/19  1844 11/25/19  0552   * 81   BUN 11 9   CREATSERUM 1.31* 1.07   GFRAA 59* 75   GFRNAA 51* 65   CA 8.7 8.1*    143   K 4.3 3. calcification involving the aorta, but no evidence for aneurysm involving the visualized portions of the aorta. Endo biliary stent present, lower portion the duodenum, upper portion in the common bile duct.   Small amount of iatrogenic biliary air inclu intrahepatics appear wnl, the cystic duct and the gallbladder were not visualized.   THERAPEUTICS: The old CBD stent was removed through the scope with the help of a rat tooth and a snare, the CBD was cannulated using a standard 0.035 R Juan Carlos Jose 1 scientific  Tri pancreatic cancer s/p biliary obstruction presenting with elevated lfts, abd pain. Pt with progressively worsened diffuse abd pain. CT abd concerning for new liver mets, no CBD dilation. Bili wnl, , ast 96, alt 84.      Assessment and Plan:  - pt's pa

## 2019-11-25 NOTE — H&P
[facility]  History and Physical     Ronandenilson Vega Patient Status:  Inpatient    11/3/1940 MRN [id number]   [facility] 4NW-A Attending Yosvany Ibrahim[address] [place] [address] Day # 0 PCP Ani Giron MD     Chief Complaint: Kavitha Claros without mention of complication, not stated as uncontrolled    • Unspecified essential hypertension    • Visual impairment     glasses   • Wears glasses         Past Surgical History:   Past Surgical History:   Procedure Laterality Date   • COLONOSCOPY Rfl: 1  CALCIUM-VITAMIN D OR, Take by mouth., Disp: , Rfl:   B Complex Vitamins (B COMPLEX-B12 OR), Take by mouth., Disp: , Rfl:   CREON 20739-42670 units Oral Cap DR Particles, TAKE 1 CAPSULE BY MOUTH THREE TIMES DAILY WITH EACH MEAL AS DIRECTED, Disp: , murmurs, rubs or gallops. Equal pulses. Chest and Back: No tenderness or deformity. Abdomen: Soft, nontender, nondistended. Positive bowel sounds. No rebound, guarding or organomegaly. Neurologic: No focal neurological deficits.  CNII-XII grossly intac low-grade temp but no source of infection. Blood cultures drawn. Respiratory panel negative. Will hold off on any antibiotic for now. Colleges to evaluate in the morning.     Quality:  · DVT Prophylaxis: Heparin  · CODE status: Full  · Yesenia: N/A    Frank

## 2019-11-25 NOTE — ED PROVIDER NOTES
Patient Seen in: [facility] Emergency Department      History   Patient presents with:  Abdomen/Flank Pain (GI/)  Fever (infectious)    Stated Complaint:     HPI    Patient is a 50-year-old male who presents emergency room with a history of pancre appearance. HEENT: Head is normocephalic, atraumatic. Pupils are 3 mm equally round and reactive to light. Oropharynx is clear. Mucous membranes are somewhat dry. NECK: There is no focal tenderness to palpation appreciated. There is no JVD.  No meningeal normal limits   PROCALCITONIN - Abnormal; Notable for the following components:    Procalcitonin 0.35 (*)     All other components within normal limits    Narrative:     Resulted by: batch: K, LIPL, CO2, CL, NA, ALB, TP, TBIL, ALT, AST, ALP, CA, CREA, BUN, Dictated by: Isac Demarco MD on 11/24/2019 at 20:13     Approved by: Isac Demarco MD on 11/24/2019 at 20:21                MDM     Patient had an IV line established blood work drawn including a CBC, chemistries, BUN/creatinine, and blood sugar all o on Admission  Date Reviewed: 10/28/2019          ICD-10-CM Noted POA    Abdominal pain, acute R10.9 11/24/2019 Unknown    Thrombocytopenia ([facility] [address] D69.6 11/24/2019 Yes

## 2019-11-25 NOTE — OPERATIVE REPORT
Gem Raymundo Patient Status:  Inpatient    11/3/1940 MRN [id number]   [facility] 4NW-A Attending Nano Fishman MD   Date 2019 PCP Rona Beasley MD     PREOPERATIVE DIAGNOSIS/INDICATION: Metastatic pancreas cancer, abdominal dariela biliary metal stent due to sludge and tumor/tissue ingrowth, the confluence of the right and left hepatic ducts and the intrahepatics appear wnl, the cystic duct and the gallbladder were not visualized.   THERAPEUTICS: The CBD stent was cannulated using a s

## 2019-11-25 NOTE — DIETARY NOTE
[facility]    NUTRITION INITIAL ASSESSMENT    Pt does not meet malnutrition criteria.     NUTRITION DIAGNOSIS/PROBLEM:    Inadequate energy intake related to decreased ability to consume sufficient energy intake as evidenced by current NPO status and p discretion    MONITOR AND EVALUATE/NUTRITION GOALS:  1.  Diet advancement within 48 hrs as medically feasible    MEDICATIONS:  IV abx, Protonix    LABS:  Glu:81, K+:3.3, elevated LFT's    Pt is at moderate nutrition risk    FOLLOW-UP DATE:   11/30/2019    E

## 2019-11-25 NOTE — PLAN OF CARE
Patient had blood monitoring device on right arm. Family and patient wanted it removed. External needle removed from device, will do accuchecks. voided in urinal in   no distress. Xanax given.  Daughter at bedside    Paged Dr Ronnie Kumar for consult this am

## 2019-11-25 NOTE — CONSULTS
[facility]  Report of Consultation    [place]y [place] Patient Status:  Inpatient    11/3/1940 MRN [id number]   [facility] 4NW-A Attending Flavia Quiñones MD   Hosp Day # 1 PCP Tarah [place] MD     Reason for Consultation:    abd pain i implants and grafts    • Prostatitis    • Renal disorder    • Skin cancer    • Sputum production    • Stress    • Thyroid disease    • Type II or unspecified type diabetes mellitus without mention of complication, not stated as uncontrolled    • Unspecifie acetaminophen (TYLENOL) tab 650 mg, 650 mg, Oral, Q6H PRN  •  docusate sodium (COLACE) cap 100 mg, 100 mg, Oral, BID  •  PEG 3350 (MIRALAX) powder packet 17 g, 17 g, Oral, Daily PRN  •  magnesium hydroxide (MILK OF MAGNESIA) 400 MG/5ML suspension 30 mL, 30 Abdomen: Soft, non tender with good bowel sounds. No hepatosplenomegaly. Extremities: Pedal pulses are present. No edema and no tenderness. Neurological: Grossly intact. Lymphatics:  There is no palpable lymphadenopathy throughout in the cervical, Albumin 3.1 (L) 3.4 - 5.0 g/dL    Globulin  4.7 (H) 2.8 - 4.4 g/dL    A/G Ratio 0.7 (L) 1.0 - 2.0   LIPASE    Collection Time: 11/24/19  6:44 PM   Result Value Ref Range    Lipase 28 (L) 73 - 393 U/L   PROTHROMBIN TIME (PT)    Collection Time: 11/24/19 Urine 6.0 4.5 - 8.0    Protein Urine Negative Negative mg/dl    Urobilinogen Urine 4.0 (A) 0.2 - 2.0 mg/dL    Nitrite Urine Negative Negative    Leukocyte Esterase Urine Negative Negative   POCT GLUCOSE    Collection Time: 11/24/19 10:16 PM   Result Value (ER)     COMPARISON:  [facility] , CT, CT CHEST+ABDOMEN+PELVIS(ALL CNTRST ONLY)(CPT=71260/68171), 7/17/2019, 13:35. [facility] , CT, CT CHEST+ABDOMEN+PELVIS(ALL CNTRST ONLY)(CPT=71260/45147), 12/31/2018, 14:37.   [facility] , CT, CT CHEST+ABDOMEN+PELVIS(ALL CNTRST   O left hepatic lobe   low-attenuation measures 41 x 21 mm, previously 36 x 21 mm. Pancreatic body atrophy. Dystrophic calcifications and prominence in the pancreatic head. This appears similar and stable.      Diverticula sigmoid colon without sign of or lactic acid dehydrogenase (LDH)     Pancreatic mass     Malignant neoplasm of head of pancreas (HCC)     Hyponatremia     Hyperglycemia     Post-procedural fever     Elevated liver enzymes     Fatigue due to treatment     Non-traumatic rotator cuff tear

## 2019-11-25 NOTE — ANESTHESIA POSTPROCEDURE EVALUATION
[address]  Patient Status:  Inpatient   Age/Gender [de-identified]year old male MRN [id number]   Location [facility]. Attending Nano Fishman MD   [address] Day # 1 PCP Rona Beasley MD       Anesthesia Post-op Note    Procedure(s):

## 2019-11-25 NOTE — CONSULTS
[facility] Pharmacy Note: Antimicrobial Weight Based Dose Adjustment for: piperacillin/tazobactam (Coretta Esquivel)    Ford Salinas is a [de-identified]year old male who has been prescribed piperacillin/tazobactam (ZOSYN) 3375 mg every 8 hours.     Estimated Creatinine Clearance:

## 2019-11-25 NOTE — PROGRESS NOTES
NURSING ADMISSION NOTE      Patient admitted via cart  Oriented to room. Safety precautions initiated. Bed in low position. Call light in reach. In no distress , denies pain. Reports one constipated stool today. No nausea at this time.  Iv  Fluids

## 2019-11-25 NOTE — PROGRESS NOTES
[facility]  Progress Note     Ajay Lawson Patient Status:  Inpatient    11/3/1940 MRN [id number]   [facility] 4NW-A Attending Carrie Vega MD   Hosp Day # 1 PCP Jonel Gomez MD     Chief Complaint: abd pain nausea vomiting piperacillin-tazobactam  4.5 g Intravenous Q8H   • potassium chloride 40mEq IVPB (peripheral line)  40 mEq Intravenous Once   • pantoprazole (PROTONIX) IV push  40 mg Intravenous Q24H   • lisinopril  40 mg Oral Daily   • lidocaine-prilocaine   Topical Once

## 2019-11-25 NOTE — ANESTHESIA PREPROCEDURE EVALUATION
PRE-OP EVALUATION    Patient Name: [facility]    Pre-op Diagnosis: Abdominal pain,mild elevated liver function test    Procedure(s):  ENDOSCOPIC RETROGRADE CHOLANGIOPANCREATOGRAPHY    Surgeon(s) and Role:     Shadi Montes MD - Primary    Pre 50 Units, 50 Units, Subcutaneous, Daily  Levothyroxine Sodium (SYNTHROID) tab 100 mcg, 100 mcg, Oral, Before breakfast  Pantoprazole Sodium (PROTONIX) EC tab 40 mg, 40 mg, Oral, Before breakfast  Venlafaxine HCl ER (EFFEXOR-XR) 24 hr cap 150 mg, 150 mg, Or 0  LANTUS SOLOSTAR 100 UNIT/ML Subcutaneous Solution Pen-injector, Inject 60 Units into the skin every morning.  , Disp: , Rfl: 0  Cetirizine HCl (ZYRTEC OR), Take 10 mg by mouth daily.   , Disp: , Rfl:         Allergies: Clindamycin      Anesthesia Evaluat Component Value Date    WBC 3.7 (L) 11/25/2019    RBC 3.83 11/25/2019    HGB 12.2 (L) 11/25/2019    HCT 38.0 (L) 11/25/2019    MCV 99.2 11/25/2019    MCH 31.9 11/25/2019    MCHC 32.1 11/25/2019    RDW 12.9 11/25/2019    .0 (L) 11/25/2019     Lab R

## 2019-11-25 NOTE — PLAN OF CARE
Pt. NPO this am. GI in to see pt and order received for ERCP today. Plans for this PM. Call received from lab and pt found to have positive blood cultures. Dr. Larissa Geller notified. GI also informed. Repeat Blood cultures ordered and collected.  Pt. Started on IV

## 2019-11-26 ENCOUNTER — APPOINTMENT (OUTPATIENT)
Dept: GENERAL RADIOLOGY | Facility: HOSPITAL | Age: 79
DRG: 919 | End: 2019-11-26
Attending: HOSPITALIST
Payer: MEDICARE

## 2019-11-26 PROBLEM — K72.90 HEPATIC ENCEPHALOPATHY (HCC): Status: ACTIVE | Noted: 2019-11-26

## 2019-11-26 PROBLEM — K76.82 HEPATIC ENCEPHALOPATHY (HCC): Status: ACTIVE | Noted: 2019-11-26

## 2019-11-26 PROCEDURE — 99232 SBSQ HOSP IP/OBS MODERATE 35: CPT | Performed by: INTERNAL MEDICINE

## 2019-11-26 PROCEDURE — 99291 CRITICAL CARE FIRST HOUR: CPT | Performed by: HOSPITALIST

## 2019-11-26 PROCEDURE — 71045 X-RAY EXAM CHEST 1 VIEW: CPT | Performed by: HOSPITALIST

## 2019-11-26 RX ORDER — POTASSIUM CHLORIDE 29.8 MG/ML
40 INJECTION INTRAVENOUS ONCE
Status: DISCONTINUED | OUTPATIENT
Start: 2019-11-26 | End: 2019-11-26

## 2019-11-26 RX ORDER — METOCLOPRAMIDE HYDROCHLORIDE 5 MG/ML
5 INJECTION INTRAMUSCULAR; INTRAVENOUS EVERY 8 HOURS PRN
Status: DISCONTINUED | OUTPATIENT
Start: 2019-11-26 | End: 2019-12-10

## 2019-11-26 RX ORDER — TRAMADOL HYDROCHLORIDE 50 MG/1
50 TABLET ORAL EVERY 6 HOURS PRN
Status: DISCONTINUED | OUTPATIENT
Start: 2019-11-26 | End: 2019-11-27

## 2019-11-26 RX ORDER — ALBUTEROL SULFATE 2.5 MG/3ML
2.5 SOLUTION RESPIRATORY (INHALATION) EVERY 6 HOURS PRN
Status: DISCONTINUED | OUTPATIENT
Start: 2019-11-26 | End: 2019-12-10

## 2019-11-26 RX ORDER — LACTULOSE 10 G/15ML
10 SOLUTION ORAL 2 TIMES DAILY
Status: DISCONTINUED | OUTPATIENT
Start: 2019-11-26 | End: 2019-12-01

## 2019-11-26 NOTE — PROGRESS NOTES
[facility] Gastroenterology Progress Note    S: Pt with fever overnight and chest/abd pain. Improved today. O: /48 (BP Location: Left arm)   Pulse 84   Temp 97.5 °F (36.4 °C) (Oral)   Resp 16   Wt 229 lb 3.2 oz (104 kg)   SpO2 95%   BMI 31. 0

## 2019-11-26 NOTE — PROGRESS NOTES
Heme/Onc Progress Note    Patient Name: Gem Raymundo   YOB: 1940   Medical Record Number: [id number]   CSN: [id number]   Attending Physician: Key Cooley M.D.      Subjective:  Had ERCP and stent revision yesterday - had some abd dariela Units, 5,000 Units, Subcutaneous, Q8H [facility] & assisted  •  acetaminophen (TYLENOL) tab 650 mg, 650 mg, Oral, Q6H PRN  •  docusate sodium (COLACE) cap 100 mg, 100 mg, Oral, BID  •  PEG 3350 (MIRALAX) powder packet 17 g, 17 g, Oral, Daily PRN  •  magnesium hydroxide (MIL regions.   Psych/Depression: mildly depressed    Labs:  Recent Results (from the past 24 hour(s))   POCT GLUCOSE    Collection Time: 11/25/19  8:52 AM   Result Value Ref Range    POC Glucose 101 (H) 70 - 99 mg/dL   POCT GLUCOSE    Collection Time: 11/25/19 >=60    AST 83 (H) 15 - 37 U/L    ALT 85 (H) 16 - 61 U/L    Alkaline Phosphatase 164 (H) 45 - 117 U/L    Bilirubin, Total 1.6 0.1 - 2.0 mg/dL    Total Protein 7.0 6.4 - 8.2 g/dL    Albumin 2.8 (L) 3.4 - 5.0 g/dL    Globulin  4.2 2.8 - 4.4 g/dL    A/G Ratio positive blood culture on admit - Klebsiella - likely biliary source. Continue antibiotics, PPI, pain meds and antiemetics as needed. CT shows mild progression again of the malignancy. NPO but will hopefully be advanced by GI.   Explained that we are cl

## 2019-11-26 NOTE — CODE DOCUMENTATION
[facility]  RAPID RESPONSE NOTE     Denise Ardon Patient Status:  Inpatient    11/3/1940 MRN [id number]   [facility] 4NW-A Attending Peng Rinaldi MD   Hosp Day # 2 PCP Yoel Jean Baptiste MD     Reason for RRT:Pt with AMS.  Pt her wi

## 2019-11-26 NOTE — PROGRESS NOTES
11/26/19 0941   Clinical Encounter Type   Visited With Patient and family together   Continue Visiting No   Referral From   (Consult for discussion of possible DNR)   Referral To Nurse  (Patient's daughter stated they have had discussion/family of docto

## 2019-11-26 NOTE — PROGRESS NOTES
11/26/19 0953   Clinical Encounter Type   Visited With Patient and family together   Patient's Supportive Strategies/Resources Patient finds community/spiritual support at [address] [facility] and [facility].  As requested,  contacted Annabella Santoyo for visit

## 2019-11-26 NOTE — PLAN OF CARE
Pt. A&O x4. VSS. Afebrile. Diet orders changed to clear liquid diet advance as tolerated. Clear liquid diet tolerated; advancing to full liquids for dinner. Pt. Feeling much better than last night. Family at the bedside.  Lactulose started and given per MD output and hemodynamic stability  Description  INTERVENTIONS:  - Monitor vital signs, rhythm, and trends  - Monitor for bleeding, hypotension and signs of decreased cardiac output  - Evaluate effectiveness of vasoactive medications to optimize hemodynamic ordered  - Evaluate effectiveness of ordered antiemetic medications  - Provide nonpharmacologic comfort measures as appropriate  - Advance diet as tolerated, if ordered  - Obtain nutritional consult as needed  - Evaluate fluid balance  Outcome: Progressing results as appropriate  - Fluid restriction as ordered  - Instruct patient on fluid and nutrition restrictions as appropriate  Outcome: Progressing

## 2019-11-26 NOTE — PLAN OF CARE
Assumed pt care at [address] [place] [address]. Pt up in chair with son requesting something to eat. GI was paged for orders. Shortly later pt began complaining of chest pain that radiated into his back and down into his abdomen. Hospitalist and GI made aware.  EKG and labs order

## 2019-11-26 NOTE — PROGRESS NOTES
Called by nurse bout Chest and epigastric pain; EKG essentially changed compared to prior, most recent epic ekg. Check trops; Agree with lipase as well, per GI. This is most likely gi in origin. Nurse updated dr. Tracy Goldsmith.

## 2019-11-27 ENCOUNTER — APPOINTMENT (OUTPATIENT)
Dept: GENERAL RADIOLOGY | Facility: HOSPITAL | Age: 79
DRG: 919 | End: 2019-11-27
Attending: NURSE PRACTITIONER
Payer: MEDICARE

## 2019-11-27 ENCOUNTER — APPOINTMENT (OUTPATIENT)
Dept: GENERAL RADIOLOGY | Facility: HOSPITAL | Age: 79
DRG: 919 | End: 2019-11-27
Attending: RADIOLOGY
Payer: MEDICARE

## 2019-11-27 ENCOUNTER — APPOINTMENT (OUTPATIENT)
Dept: INTERVENTIONAL RADIOLOGY/VASCULAR | Facility: HOSPITAL | Age: 79
DRG: 919 | End: 2019-11-27
Attending: INTERNAL MEDICINE
Payer: MEDICARE

## 2019-11-27 ENCOUNTER — APPOINTMENT (OUTPATIENT)
Dept: CT IMAGING | Facility: HOSPITAL | Age: 79
DRG: 919 | End: 2019-11-27
Attending: INTERNAL MEDICINE
Payer: MEDICARE

## 2019-11-27 PROCEDURE — 71045 X-RAY EXAM CHEST 1 VIEW: CPT | Performed by: RADIOLOGY

## 2019-11-27 PROCEDURE — 99232 SBSQ HOSP IP/OBS MODERATE 35: CPT | Performed by: HOSPITALIST

## 2019-11-27 PROCEDURE — 05HM33Z INSERTION OF INFUSION DEVICE INTO RIGHT INTERNAL JUGULAR VEIN, PERCUTANEOUS APPROACH: ICD-10-PCS | Performed by: RADIOLOGY

## 2019-11-27 PROCEDURE — 99232 SBSQ HOSP IP/OBS MODERATE 35: CPT | Performed by: INTERNAL MEDICINE

## 2019-11-27 PROCEDURE — 71045 X-RAY EXAM CHEST 1 VIEW: CPT | Performed by: NURSE PRACTITIONER

## 2019-11-27 PROCEDURE — 74176 CT ABD & PELVIS W/O CONTRAST: CPT | Performed by: INTERNAL MEDICINE

## 2019-11-27 PROCEDURE — 30233L1 TRANSFUSION OF NONAUTOLOGOUS FRESH PLASMA INTO PERIPHERAL VEIN, PERCUTANEOUS APPROACH: ICD-10-PCS | Performed by: HOSPITALIST

## 2019-11-27 RX ORDER — METRONIDAZOLE 500 MG/100ML
500 INJECTION, SOLUTION INTRAVENOUS EVERY 8 HOURS
Status: DISCONTINUED | OUTPATIENT
Start: 2019-11-27 | End: 2019-11-29

## 2019-11-27 RX ORDER — DEXTROSE AND SODIUM CHLORIDE 5; .9 G/100ML; G/100ML
INJECTION, SOLUTION INTRAVENOUS CONTINUOUS PRN
Status: DISCONTINUED | OUTPATIENT
Start: 2019-11-27 | End: 2019-12-10

## 2019-11-27 RX ORDER — DEXTROSE MONOHYDRATE 25 G/50ML
50 INJECTION, SOLUTION INTRAVENOUS
Status: DISCONTINUED | OUTPATIENT
Start: 2019-11-27 | End: 2019-12-10

## 2019-11-27 RX ORDER — DEXTROSE MONOHYDRATE 25 G/50ML
50 INJECTION, SOLUTION INTRAVENOUS
Status: DISCONTINUED | OUTPATIENT
Start: 2019-11-27 | End: 2019-11-27

## 2019-11-27 RX ORDER — SODIUM CHLORIDE 9 MG/ML
INJECTION, SOLUTION INTRAVENOUS ONCE
Status: COMPLETED | OUTPATIENT
Start: 2019-11-27 | End: 2019-11-27

## 2019-11-27 RX ORDER — TRAMADOL HYDROCHLORIDE 50 MG/1
50 TABLET ORAL EVERY 8 HOURS PRN
Status: DISCONTINUED | OUTPATIENT
Start: 2019-11-27 | End: 2019-12-01

## 2019-11-27 RX ORDER — LIDOCAINE HYDROCHLORIDE AND EPINEPHRINE 10; 10 MG/ML; UG/ML
INJECTION, SOLUTION INFILTRATION; PERINEURAL
Status: COMPLETED
Start: 2019-11-27 | End: 2019-11-27

## 2019-11-27 RX ORDER — SODIUM CHLORIDE 9 MG/ML
INJECTION, SOLUTION INTRAVENOUS CONTINUOUS
Status: DISCONTINUED | OUTPATIENT
Start: 2019-11-27 | End: 2019-11-27

## 2019-11-27 NOTE — PLAN OF CARE
Patient alert and X3, drowsy but can follow simple commands. POA/Son Dr. Vandana Meyer requested Xanax and Morphine to be D/C'd, and have Tramadol for pain instead. Dr. Terrie Seth notified and gave new orders.      Patient complained of abdominal pain, PRN pain me

## 2019-11-27 NOTE — PLAN OF CARE
Received from 419 @ ~0600. Ox4, lethargic, follows commands. Lungs clear/diminihsed with some crackles heard over L basilar region, 8lts HFNC. Abdomen round/distended, nontender with active bowel sounds. BLE edema +2. Mucous membranes dry.  Levo started for

## 2019-11-27 NOTE — CONSULTS
[facility]  Report of Consultation    Denise Ardon Patient Status:  Inpatient    11/3/1940 MRN [id number]   [facility] 4SW-A Attending Peng Rinaldi MD   Hosp Day # 3 PCP Yoel Jean Baptiste MD     Reason for Consultation: Intensive c is a mild diffuse discomfort (without rebound).     PAST HISTORY:    · Hypothyroidism on repletion therapy  · Essential hypertension  · BPH/prostatism  · Type 2 diabetes mellitus    Past Surgical History:   Procedure Laterality Date   • COLONOSCOPY     • EN Take 1 tablet by mouth daily. , Disp: , Rfl: , 11/24/2019 at Unknown time  B Complex Vitamins (B COMPLEX-B12 OR), Take 1 tablet by mouth daily.   , Disp: , Rfl: , 11/24/2019 at Unknown time  CREON 90351-59856 units Oral Cap DR Particles, TAKE 1 CAPSULE BY Output 400 ml   Net 2018 ml       Physical Exam:   General: alert, cooperative, oriented. No subjective respiratory distress although he he is tachypneic. Head: Normocephalic, without obvious abnormality, atraumatic.    Eyes: Conjunctivae/corneas clear overload signs. Assessment and Plan:    1. Sepsis syndrome-likely source from the right upper quadrant/pancreas-biliary system. Antibiotic spectrum broadened to include antifungal agents and Flagyl.   2. Multiorgan system dysfunction including renal ins

## 2019-11-27 NOTE — PLAN OF CARE
Received pt drowsy. Oriented to self, situation, place. Unsure of date but reports November 2019. Continues on 8L HFNC. Afebrile. NSR on monitor. Tolerating diet per order. BM x 1. Patterson cathter in place. No c/o pain or discomfort. No s/s of distress.

## 2019-11-27 NOTE — PROGRESS NOTES
[facility]  Progress Note     Jose Alfredo Erm Patient Status:  Inpatient    11/3/1940 MRN [id number]   [facility] 4NW-A Attending Cori Hardwick MD   Hosp Day # 3 PCP Niecy Walker MD     Chief Complaint: abdominal pain    S: Patie Estimated Creatinine Clearance: 23.2 mL/min (A) (based on SCr of 2.79 mg/dL (H)).     Recent Labs   Lab 11/24/19  1844 11/27/19  0630   PTP 15.0* 21.0*   INR 1.13* 1.71*       Recent Labs   Lab 11/25/19 2018 11/26/19  0133   TROP <0.045 <0.045

## 2019-11-27 NOTE — PROGRESS NOTES
Heme/Onc Progress Note    Patient Name: Darien Singh   YOB: 1940   Medical Record Number: [id number]   CSN: [id number]   Attending Physician: Mahesh Aceves M.D. Subjective:  Relatively acute deterioration over the last 12 hours. % 100 mL MBP/ADD-vantage, 4.5 g, Intravenous, Q8H  •  Pantoprazole Sodium (PROTONIX) 40 mg in Sodium Chloride 0.9 % 10 mL IV push, 40 mg, Intravenous, Q24H  •  lisinopril tab 40 mg, 40 mg, Oral, Daily  •  hydrALAzine HCl (APRESOLINE) injection 10 mg, 10 mg non tender with good bowel sounds. Extremities: Pedal pulses are present. No edema. Neurological: Grossly intact. Lymphatics: There is no palpable lymphadenopathy throughout in the cervical, supraclavicular, axillary, or inguinal regions.   Psych/Depres Ratio 0.6 (L) 1.0 - 2.0   LACTIC ACID, PLASMA    Collection Time: 11/27/19  5:02 AM   Result Value Ref Range    Lactic Acid 12.0 (HH) 0.4 - 2.0 mmol/L   CBC W/ DIFFERENTIAL    Collection Time: 11/27/19  5:02 AM   Result Value Ref Range    WBC 10.0 4.0 - 11       3)  Diabetes - Insulin drip started.    4)  Fever - as above.     5)  Oral candidiasis - due to all of the above. on nystatin.    6)  Thrombocytopenia - worsening - likely consumptive but need to rule out HIPA - HIPA and YAW ordered.     7)  Pancre

## 2019-11-27 NOTE — PROGRESS NOTES
Pharmacy Note:  Renal / Weight Adjustment for Meropenem (MERREM)         Gypsy Rogers is a [de-identified]year old male who has been prescribed meropenem 1000mg q8hr.       Est CrCl: ~25 mL/min    The dose has been adjusted to meropenem 500mg q12hr per hospital

## 2019-11-27 NOTE — CONSULTS
[facility]      Endocrinology Consultation    Huong Coy Patient Status:  Inpatient    11/3/1940 MRN [id number]   [facility] 4SW-A Attending Franchesca Benson MD   Hosp Day # 3 PCP Colleen Steinberg MD     Reason for Consultation:  Hype mouth daily. , Disp: , Rfl: , 11/24/2019 at Unknown time  B Complex Vitamins (B COMPLEX-B12 OR), Take 1 tablet by mouth daily.   , Disp: , Rfl: , 11/24/2019 at Unknown time  CREON 54821-26626 units Oral Cap DR Particles, TAKE 1 CAPSULE BY MOUTH THREE TIMES Nausea    • Osteoarthritis    • Pain in joints    • Personal history of antineoplastic chemotherapy 09/2018   • PONV (postoperative nausea and vomiting)    • Presence of other cardiac implants and grafts    • Prostatitis    • Renal disorder    • Skin cance mL, Intravenous, Once  •  norepinephrine (LEVOPHED) 4 mg/250 ml premix infusion, 0.5-30 mcg/min, Intravenous, Continuous PRN  •  vasopressin (PITRESSIN) 20 Units in sodium chloride 0.9% 50 mL infusion for shock, 0.04 Units/min, Intravenous, Continuous PRN Daily PRN  •  bisacodyl (DULCOLAX) rectal suppository 10 mg, 10 mg, Rectal, Daily PRN  •  FLEET ENEMA (FLEET) 7-19 GM/118ML enema 133 mL, 1 enema, Rectal, Once PRN  •  ondansetron HCl (ZOFRAN) injection 4 mg, 4 mg, Intravenous, Q6H PRN  •  Levothyroxine So 11/25/19  2305 11/26/19  0502 11/26/19  0813 11/26/19  1130 11/26/19  1402 11/26/19  1717 11/26/19  1817 11/26/19  2201 11/27/19  0443 11/27/19  0630 11/27/19  0731 11/27/19  0830   PGLU 105* 101* 135* 156* 246* 217* 302* 329* 340* 357* 346* 339* 355* 258*

## 2019-11-27 NOTE — PROGRESS NOTES
Transfer from floor with sepsis (lactic of 12, low BP and febrile). Orders for sepsis bundle placed. Has received 1 liter of NS prior to arrival to unit, will order the rest to equal the sepsis bolus.        -bc x 2 reordered   -abg, cxr, ammonia and li

## 2019-11-27 NOTE — CONSULTS
INFECTIOUS DISEASE [facility] Patient Status:  Inpatient    11/3/1940 MRN [id number]   [facility] 4SW-A Attending Deshaun Meneses MD   [facility] Day # 3 PCP Raulito Juárez II or unspecified type diabetes mellitus without mention of complication, not stated as uncontrolled    • Unspecified essential hypertension    • Visual impairment     glasses   • Wears glasses      Past Surgical History:   Procedure Laterality Date   • CO Regular Human (NOVOLIN R) 100 Units in sodium chloride 0.9% 100 mL infusion, 2-52 Units/hr, Intravenous, Continuous  •  glucose (DEX4) oral liquid 15 g, 15 g, Oral, Q15 Min PRN **OR** Glucose-Vitamin C (DEX-4) chewable tab 4 tablet, 4 tablet, Oral, Q15 Min (EFFEXOR-XR) 24 hr cap 150 mg, 150 mg, Oral, Daily  •  Alfuzosin HCl ER (UROXATRAL) 24 hr tab 10 mg, 10 mg, Oral, Daily with breakfast  No current facility-administered medications on file prior to encounter.    lisinopril 40 MG Oral Tab, Take 40 mg by mout °C)] 98.7 °F (37.1 °C)  Pulse:  [] 89  Resp:  [16-29] 27  BP: ()/(35-98) 89/48  HEENT: Moist mucous membranes. Extraocular muscles are intact. Neck: No lymphadenopathy. supple, no masses  Respiratory: Clear to auscultation bilaterally.   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

## 2019-11-28 ENCOUNTER — APPOINTMENT (OUTPATIENT)
Dept: GENERAL RADIOLOGY | Facility: HOSPITAL | Age: 79
DRG: 919 | End: 2019-11-28
Attending: INTERNAL MEDICINE
Payer: MEDICARE

## 2019-11-28 PROCEDURE — 99233 SBSQ HOSP IP/OBS HIGH 50: CPT | Performed by: HOSPITALIST

## 2019-11-28 PROCEDURE — 30233R1 TRANSFUSION OF NONAUTOLOGOUS PLATELETS INTO PERIPHERAL VEIN, PERCUTANEOUS APPROACH: ICD-10-PCS | Performed by: HOSPITALIST

## 2019-11-28 PROCEDURE — 99232 SBSQ HOSP IP/OBS MODERATE 35: CPT | Performed by: INTERNAL MEDICINE

## 2019-11-28 PROCEDURE — 99223 1ST HOSP IP/OBS HIGH 75: CPT | Performed by: INTERNAL MEDICINE

## 2019-11-28 PROCEDURE — 71045 X-RAY EXAM CHEST 1 VIEW: CPT | Performed by: INTERNAL MEDICINE

## 2019-11-28 RX ORDER — SODIUM CHLORIDE 450 MG/100ML
INJECTION, SOLUTION INTRAVENOUS CONTINUOUS
Status: DISCONTINUED | OUTPATIENT
Start: 2019-11-28 | End: 2019-11-30

## 2019-11-28 RX ORDER — IPRATROPIUM BROMIDE AND ALBUTEROL SULFATE 2.5; .5 MG/3ML; MG/3ML
3 SOLUTION RESPIRATORY (INHALATION)
Status: DISCONTINUED | OUTPATIENT
Start: 2019-11-28 | End: 2019-11-29

## 2019-11-28 RX ORDER — LIDOCAINE HYDROCHLORIDE 20 MG/ML
20 JELLY TOPICAL AS NEEDED
Status: DISCONTINUED | OUTPATIENT
Start: 2019-11-28 | End: 2019-12-10

## 2019-11-28 RX ORDER — SODIUM CHLORIDE 9 MG/ML
INJECTION, SOLUTION INTRAVENOUS ONCE
Status: COMPLETED | OUTPATIENT
Start: 2019-11-28 | End: 2019-11-28

## 2019-11-28 NOTE — PROGRESS NOTES
Heme/Onc Progress Note    Patient Name: Ajay Lawson   YOB: 1940   Medical Record Number: [id number]   CSN: [id number]     Subjective: In ICU. CXR this AM with worsening pleural parenchymal changes.  Cr 2.29, , , WBC 11.5, HB Oral, Q15 Min PRN **OR** dextrose 50 % injection 50 mL, 50 mL, Intravenous, Q15 Min PRN **OR** glucose (DEX4) oral liquid 30 g, 30 g, Oral, Q15 Min PRN **OR** Glucose-Vitamin C (DEX-4) chewable tab 8 tablet, 8 tablet, Oral, Q15 Min PRN  •  albuterol sulfat edema. Neurological: Grossly intact. Lymphatics: There is no palpable lymphadenopathy throughout in the cervical, supraclavicular, axillary, or inguinal regions. Psych/Depression: unevaluable.     Labs:  Recent Results (from the past 24 hour(s))   POCT Value Ref Range    Na Urine Random 12 mmol/L   CREATININE, URINE, RANDOM    Collection Time: 11/27/19  2:32 PM   Result Value Ref Range    Creatinine Ur Random 186.00 mg/dL   POCT GLUCOSE    Collection Time: 11/27/19  2:35 PM   Result Value Ref Range    PO 0.70 - 1.30 mg/dL    BUN/CREA Ratio 15.3 10.0 - 20.0    Calcium, Total 7.2 (L) 8.5 - 10.1 mg/dL    Calculated Osmolality 301 (H) 275 - 295 mOsm/kg    GFR, Non- 21 (L) >=60    GFR, -American 24 (L) >=72   COMP METABOLIC PANEL (14) Calculated Osmolality 301 (H) 275 - 295 mOsm/kg    GFR, Non- 23 (L) >=60    GFR, -American 27 (L) >=60   POCT GLUCOSE    Collection Time: 11/27/19  9:00 PM   Result Value Ref Range    POC Glucose 153 (H) 70 - 99 mg/dL   POCT GLUCOSE American 26 (L) >=60    GFR, -American 30 (L) >=60     (H) 15 - 37 U/L     (H) 16 - 61 U/L    Alkaline Phosphatase 116 45 - 117 U/L    Bilirubin, Total 1.6 0.1 - 2.0 mg/dL    Total Protein 5.8 (L) 6.4 - 8.2 g/dL    Albumin 1.9 (L) 3.4 Normal Normal   POCT GLUCOSE    Collection Time: 11/28/19  4:59 AM   Result Value Ref Range    POC Glucose 164 (H) 70 - 99 mg/dL   POCT GLUCOSE    Collection Time: 11/28/19  6:00 AM   Result Value Ref Range    POC Glucose 165 (H) 70 - 99 mg/dL   POCT GLUCO

## 2019-11-28 NOTE — PLAN OF CARE
Assumed care at [address] [place] [address]. Drowsy. See flowsheet for further assessment. Episodes of agitation mostly in the AM.  HFNC 8L. Wean as tolerated. SR (ST when agitated). VSS. CVP (via Rt IJ) Q2H -- notified APN when out of ideal range. NPO. Accuchecks Q1H.    Ani

## 2019-11-28 NOTE — PLAN OF CARE
Received pt drowsy. Oriented to self, situation, place. Unsure of date but reports November 2019. Titrated to 6L HFNC. Afebrile. NSR on monitor. Tolerating diet per order. Multiple BMs. Patterson cathter in place. No c/o pain or discomfort.  No s/s of distr

## 2019-11-28 NOTE — CONSULTS
[facility]  Report of Consultation    Baljit Malik Patient Status:  Inpatient    11/3/1940 MRN [id number]   [facility] 4[facility]-A Attending Elis Hurtado MD   [address] Day # 4 PCP Pantera Quintanilla MD       Assessment / Plan:    1) NEMO- due to without mention of complication, not stated as uncontrolled    • Unspecified essential hypertension    • Visual impairment     glasses   • Wears glasses      Past Surgical History:   Procedure Laterality Date   • COLONOSCOPY     • ENDOSCOPIC RETROGRADE CHO (LEVOPHED) 4 mg/250 ml premix infusion, 0.5-30 mcg/min, Intravenous, Continuous PRN  •  vasopressin (PITRESSIN) 20 Units in sodium chloride 0.9% 50 mL infusion for shock, 0.04 Units/min, Intravenous, Continuous PRN  •  dextrose 5 % and 0.9 % NaCl infusion, 10 mg, 10 mg, Rectal, Daily PRN  •  FLEET ENEMA (FLEET) 7-19 GM/118ML enema 133 mL, 1 enema, Rectal, Once PRN  •  ondansetron HCl (ZOFRAN) injection 4 mg, 4 mg, Intravenous, Q6H PRN  •  Levothyroxine Sodium (SYNTHROID) tab 100 mcg, 100 mcg, Oral, Before br ALKPHO 116 11/28/2019    BILT 1.6 11/28/2019    TP 5.8 11/28/2019     11/28/2019     11/28/2019    MG 1.8 11/28/2019    PHOS 3.3 11/28/2019    PGLU 207 11/28/2019       Imaging: All imaging studies reviewed.       Thank you for allowing me

## 2019-11-28 NOTE — PROGRESS NOTES
[facility]  Progress Note     Aletha Escalante Patient Status:  Inpatient    11/3/1940 MRN [id number]   [facility] 4NW-A Attending Faiza Walker MD   Hosp Day # 4 PCP Nga Coker MD     Chief Complaint: abdominal pain    S: Patie 250*   < > 216*  216* 173* 194*   BUN 35*   < > 42*  42* 42* 47*   CREATSERUM 2.79*   < > 2.75*  2.75* 2.52* 2.29*   GFRAA 24*   < > 24*  24* 27* 30*   GFRNAA 20*   < > 21*  21* 23* 26*   CA 7.7*   < > 7.2*  7.2* 7.6* 7.6*   ALB 2.2*  --  2.1*  --  1.9* overload  1. Consider lasix later today or if develops worsening hypoxia, weights trending up. 7. Fatigue 2/2 sepsis  1. Caution po intake as d/w family and RN.   Needs to be upright and more alert to start po.   8. Metabolic acidosis, improved w/ bicarb

## 2019-11-28 NOTE — PROGRESS NOTES
[facility]  Progress Note    Dl Enrique Patient Status:  Inpatient    11/3/1940 MRN [id number]   [facility] 4SW-A Attending Scooby Quiroga MD   Hosp Day # 4 PCP Kelly Gupta MD       SUBJECTIVE:  Overnight gap closed, has been n 11/25/19  0852 11/25/19  1155 11/25/19  1600 11/25/19  2125 11/25/19  2305 11/26/19  0502 11/26/19  0813 11/26/19  1130 11/26/19  1402 11/26/19  1717 11/26/19  1817 11/26/19  2201 11/27/19  0443 11/27/19  0630 11/27/19  0731 11/27/19  0830 11/27/19  09 tablet, Oral, Q15 Min PRN  albuterol sulfate (VENTOLIN) (2.5 MG/3ML) 0.083% nebulizer solution 2.5 mg, 2.5 mg, Nebulization, Q6H PRN  nystatin (MYCOSTATIN) suspension 500,000 Units, 5 mL, Oral, QID  lactulose (CHRONULAC) 10 GM/15ML solution 10 g, 10 g, Ora addressed as fully as possible. I will continue to follow closely while the patient is in the hospital.  Please feel free to contact me with any questions or concerns.       Yessenia Elizondo MD  Endocrinology, Diabetes, and Metabolism  [facility]

## 2019-11-28 NOTE — PROGRESS NOTES
[facility]  Progress Note    Mirlande Patel Patient Status:  Inpatient    11/3/1940 MRN [id number]   [facility] 4SW-A Attending Brooke Sullivan MD   [address] Day # 4 PCP Hung Mejia MD     Subjective:  Mirlande Patel is a(n) 79 year 2.75* 2.52* 2.29*   GFRAA 24*   < > 24*  24* 27* 30*   GFRNAA 20*   < > 21*  21* 23* 26*   CA 7.7*   < > 7.2*  7.2* 7.6* 7.6*   ALB 2.2*  --  2.1*  --  1.9*      < > 137  137 138 140   K 4.2   < > 4.4  4.4 4.1 4.1      < > 107  107 108 109   CO agents and Flagyl. 2. Multiorgan system dysfunction including renal insufficiency, abnormal coags/elevated ammonia level, hypotension  3. Hypothyroidism on repletion therapy  4. Essential hypertension  5. BPH/prostatism  6.  Type 2 diabetes mellitus- AG ac

## 2019-11-29 ENCOUNTER — APPOINTMENT (OUTPATIENT)
Dept: GENERAL RADIOLOGY | Facility: HOSPITAL | Age: 79
DRG: 919 | End: 2019-11-29
Attending: INTERNAL MEDICINE
Payer: MEDICARE

## 2019-11-29 ENCOUNTER — APPOINTMENT (OUTPATIENT)
Dept: ULTRASOUND IMAGING | Facility: HOSPITAL | Age: 79
DRG: 919 | End: 2019-11-29
Attending: NURSE PRACTITIONER
Payer: MEDICARE

## 2019-11-29 PROCEDURE — 71045 X-RAY EXAM CHEST 1 VIEW: CPT | Performed by: INTERNAL MEDICINE

## 2019-11-29 PROCEDURE — 99232 SBSQ HOSP IP/OBS MODERATE 35: CPT | Performed by: HOSPITALIST

## 2019-11-29 PROCEDURE — 93970 EXTREMITY STUDY: CPT | Performed by: NURSE PRACTITIONER

## 2019-11-29 PROCEDURE — 99233 SBSQ HOSP IP/OBS HIGH 50: CPT | Performed by: INTERNAL MEDICINE

## 2019-11-29 RX ORDER — FUROSEMIDE 10 MG/ML
40 INJECTION INTRAMUSCULAR; INTRAVENOUS
Status: DISCONTINUED | OUTPATIENT
Start: 2019-11-29 | End: 2019-12-01

## 2019-11-29 RX ORDER — POTASSIUM CHLORIDE 29.8 MG/ML
40 INJECTION INTRAVENOUS ONCE
Status: COMPLETED | OUTPATIENT
Start: 2019-11-30 | End: 2019-11-30

## 2019-11-29 RX ORDER — IPRATROPIUM BROMIDE AND ALBUTEROL SULFATE 2.5; .5 MG/3ML; MG/3ML
3 SOLUTION RESPIRATORY (INHALATION)
Status: DISCONTINUED | OUTPATIENT
Start: 2019-11-29 | End: 2019-12-03

## 2019-11-29 RX ORDER — CEFAZOLIN SODIUM/WATER 2 G/20 ML
2 SYRINGE (ML) INTRAVENOUS EVERY 12 HOURS
Status: DISCONTINUED | OUTPATIENT
Start: 2019-11-30 | End: 2019-12-01

## 2019-11-29 RX ORDER — MAGNESIUM SULFATE HEPTAHYDRATE 40 MG/ML
2 INJECTION, SOLUTION INTRAVENOUS ONCE
Status: COMPLETED | OUTPATIENT
Start: 2019-11-30 | End: 2019-11-30

## 2019-11-29 RX ORDER — MAGNESIUM OXIDE 400 MG (241.3 MG MAGNESIUM) TABLET
3 TABLET NIGHTLY
Status: DISCONTINUED | OUTPATIENT
Start: 2019-11-29 | End: 2019-12-10

## 2019-11-29 NOTE — PROGRESS NOTES
[facility]  Progress Note    Aletha Escalante Patient Status:  Inpatient    11/3/1940 MRN [id number]   [facility] 4SW-A Attending Faiza Walker MD   [facility] Day # 5 PCP Nga Coker MD     Subjective:  Aletha Escalante is a(n) 79 year NEPRELIM 11.23* 10.71*  --  14.49*   WBC 13.8* 11.5*  --  15.8*   PLT 25.0* 19.0* 30.0* 31.0*     Recent Labs   Lab 11/27/19 1959 11/28/19 0412 11/29/19  0618   * 194* 126*   BUN 42* 47* 61*   CREATSERUM 2.52* 2.29* 1.76*   GFRAA 27* 30* 41*   G

## 2019-11-29 NOTE — PLAN OF CARE
Problem: Impaired Swallowing  Goal: Minimize aspiration risk  Description  Interventions:  - Patient should be alert and upright for all feedings (90 degrees preferred)  - Offer food and liquids at a slow rate  - No straws  - Encourage small bites of cheyenne

## 2019-11-29 NOTE — SLP NOTE
ADULT SWALLOWING EVALUATION    ASSESSMENT    ASSESSMENT/OVERALL IMPRESSION:  Patient seen for swallowing evaluation after being observed to cough while taking pills with water overnight.   Patient admitted due to abdominal pain in the setting of pancreatic complication ([facility] [address]    Malignant neoplasm of head of pancreas (HCC)    Elevated liver enzymes    Thrombocytopenia (HCC)    Non-intractable vomiting with nausea, unspecified vomiting type    Malignant neoplasm of pancreas, unspecified location of malignancy EXAMINATION  Dentition: Natural;Functional  Symmetry: Within Functional Limits  Strength:  Within Functional Limits  Tone: Within Functional Limits  Range of Motion: Within Functional Limits  Rate of Motion: Within Functional Limits    Voice Quality: Clear

## 2019-11-29 NOTE — CONSULTS
[facility]    PATIENT'S NAME: Ophelia Hawkins   ATTENDING PHYSICIAN: CARLEE Carcamo Dies: Rupinder Bynum M.D.    PATIENT ACCOUNT#:   [de-identified]    LOCATION:  [address] [place] [address]  MEDICAL RECORD #:   [id number]       DATE OF BIRTH: no need to change antibiotics at this time, and ID service will follow him up in the a.m. Continue ICU monitoring for the time being.      Dictated By Alfonso Martini M.D.  d: 11/28/2019 19:26:50  t: 11/28/2019 19:41:44  [facility] [id number]  [facility]/

## 2019-11-29 NOTE — PLAN OF CARE
Received report and assumed care of pt at 0730. Pt slightly disoriented, confused, forgetful, states the year is \"[birth year]39\". Pt denies pain. Pt weaned off HFNC to room air. VSS. Speech therapy evaluated pt; see note, pt tolerating meal/pills with applesauce.

## 2019-11-29 NOTE — CM/SW NOTE
11/29/19 1500   CM/SW Screening   Referral Source    Information Source Chart review   Patient's Mental Status Alert;Oriented   Patient lives with Spouse  (family)   Patient Status Prior to Admission   Independent with ADLs and Mobility Yes

## 2019-11-29 NOTE — PLAN OF CARE
Received pt drowsy, oriented to person and place and occasionally date. VSS. SR noted on monitor. Ok to dc CVP per dr Marina Beth. Right IJ TLC, dressing c/d/i. Pt restless in bed. Poor sleep throughout night. Pt family at bedside. Will continue to monitor. oxygen saturation or ABGs  - Provide Smoking Cessation handout, if applicable  - Encourage broncho-pulmonary hygiene including cough, deep breathe, Incentive Spirometry  - Assess the need for suctioning and perform as needed  - Assess and instruct to repor

## 2019-11-29 NOTE — PROGRESS NOTES
[facility]  Nephrology Progress Note    Mirlande Patel Attending:  Brooke Sullivan MD       Assessment and Plan:    1) NEMO- due to dehydration / contrast nephropathy (CT 11/24) in setting of sepsis- improving; no other acute insults.  Somewhat volume u BUN 61 11/29/2019     11/29/2019    K 3.4 11/29/2019     11/29/2019    CO2 26.0 11/29/2019     11/29/2019    CA 7.7 11/29/2019    ALB 2.1 11/29/2019    ALKPHO 211 11/29/2019    BILT 1.6 11/29/2019    TP 5.8 11/29/2019     11/29/20 Q8H PRN  Pantoprazole Sodium (PROTONIX) 40 mg in Sodium Chloride 0.9 % 10 mL IV push, 40 mg, Intravenous, Q24H  hydrALAzine HCl (APRESOLINE) injection 10 mg, 10 mg, Intravenous, Q6H PRN  acetaminophen (TYLENOL) tab 650 mg, 650 mg, Oral, Q6H PRN  docusate s

## 2019-11-29 NOTE — PROGRESS NOTES
[facility]                INFECTIOUS DISEASE PROGRESS NOTE    Gypsy Rogers Patient Status:  Inpatient    11/3/1940 MRN [id number]   [facility] 4SW-A Attending Libia Henderson MD   Hosp Day # 5 PCP Francesca Mae MD     Antibioti *  --  467* 333*   BILT 1.4  --  1.6 1.6   TP 6.2*  --  5.8* 5.8*       No results found for: [facility] Encounter on 11/24/19   1.  BLOOD CULTURE     Status: None (Preliminary result)    Collection Time: 11/27/19  6:37 AM   Re

## 2019-11-29 NOTE — PROGRESS NOTES
[facility]  Progress Note    Cindy Hou Patient Status:  Inpatient    11/3/1940 MRN [id number]   [facility] 4SW-A Attending Gemma Reveles MD   Hosp Day # 5 PCP Marychuy West MD       SUBJECTIVE:  No acute events overnight.   Had 11/27/19  0830 11/27/19  0928 11/27/19  1023 11/27/19  1132 11/27/19  1333 11/27/19  1435 11/27/19  1537 11/27/19  1602 11/27/19  1730 11/27/19  1833 11/27/19  1955 11/27/19  2100 11/27/19  2108 11/27/19  2155 11/27/19  2258 11/28/19  0001 11/28/19  0056 1 Or  Glucose-Vitamin C (DEX-4) chewable tab 4 tablet, 4 tablet, Oral, Q15 Min PRN    Or  dextrose 50 % injection 50 mL, 50 mL, Intravenous, Q15 Min PRN    Or  glucose (DEX4) oral liquid 30 g, 30 g, Oral, Q15 Min PRN    Or  Glucose-Vitamin C (DEX-4) chewable mets, elevated LFTs - s/p biliary stent revision 11/25. Per GI.      Plan of care discussed with patient/family at bedside. All questions/concerns addressed as fully as possible.       I will continue to follow closely while the patient is in the hospital.

## 2019-11-29 NOTE — PROGRESS NOTES
Heme/Onc Progress Note    Patient Name: Bindu Gupta   YOB: 1940   Medical Record Number: [id number]   CSN: [id number]     Subjective: In ICU. Plt stable at 31, Hb 11.4, WBC 15.4. Cr down to 1.76.  Family at bedside, he is a little more ag (DEX-4) chewable tab 8 tablet, 8 tablet, Oral, Q15 Min PRN  •  albuterol sulfate (VENTOLIN) (2.5 MG/3ML) 0.083% nebulizer solution 2.5 mg, 2.5 mg, Nebulization, Q6H PRN  •  nystatin (MYCOSTATIN) suspension 500,000 Units, 5 mL, Oral, QID  •  lactulose ( the past 24 hour(s))   POCT GLUCOSE    Collection Time: 11/28/19  8:08 AM   Result Value Ref Range    POC Glucose 184 (H) 70 - 99 mg/dL   POCT GLUCOSE    Collection Time: 11/28/19  8:57 AM   Result Value Ref Range    POC Glucose 178 (H) 70 - 99 mg/dL   POC Result Value Ref Range    POC Glucose 250 (H) 70 - 99 mg/dL   POCT GLUCOSE    Collection Time: 11/28/19  5:17 PM   Result Value Ref Range    POC Glucose 188 (H) 70 - 99 mg/dL   POCT GLUCOSE    Collection Time: 11/28/19  6:14 PM   Result Value Ref Range (H) 275 - 295 mOsm/kg    GFR, Non- 36 (L) >=60    GFR, -American 41 (L) >=60     (H) 15 - 37 U/L     (H) 16 - 61 U/L    Alkaline Phosphatase 211 (H) 45 - 117 U/L    Bilirubin, Total 1.6 0.1 - 2.0 mg/dL    Total Protein in ICU with pressor support which has been weaned off. Cr and LFTs are improving.    2)  Pain - prn narcotics.       3)  Diabetes - Insulin drip started.    4)  Fever - as above.     5)  Oral candidiasis - due to all of the above. on nystatin.      6)

## 2019-11-30 ENCOUNTER — APPOINTMENT (OUTPATIENT)
Dept: GENERAL RADIOLOGY | Facility: HOSPITAL | Age: 79
DRG: 919 | End: 2019-11-30
Attending: INTERNAL MEDICINE
Payer: MEDICARE

## 2019-11-30 PROCEDURE — 99232 SBSQ HOSP IP/OBS MODERATE 35: CPT | Performed by: INTERNAL MEDICINE

## 2019-11-30 PROCEDURE — 99233 SBSQ HOSP IP/OBS HIGH 50: CPT | Performed by: INTERNAL MEDICINE

## 2019-11-30 PROCEDURE — 71045 X-RAY EXAM CHEST 1 VIEW: CPT | Performed by: INTERNAL MEDICINE

## 2019-11-30 RX ORDER — PANTOPRAZOLE SODIUM 40 MG/1
40 TABLET, DELAYED RELEASE ORAL
Status: DISCONTINUED | OUTPATIENT
Start: 2019-11-30 | End: 2019-12-10

## 2019-11-30 RX ORDER — POTASSIUM CHLORIDE 14.9 MG/ML
20 INJECTION INTRAVENOUS ONCE
Status: COMPLETED | OUTPATIENT
Start: 2019-11-30 | End: 2019-11-30

## 2019-11-30 NOTE — PLAN OF CARE
Received report and assumed care of pt at 0730. Pt disoriented to date, otherwise oriented, seems less delirious today. Pt denies pain although c/o scrotal/coccyx pain when changing brief; aloe lotion applied, repositioned frequently to relieve pressure.  P

## 2019-11-30 NOTE — PROGRESS NOTES
[facility] Pharmacy Note: Route Optimization for Pantoprazole (PROTONIX)    Patient is currently on Pantoprazole (PROTONIX) 40 mg IV every 24 hours.    The patient meets the criteria to convert to the oral equivalent as established by the IV to Oral conversion pro

## 2019-11-30 NOTE — RESPIRATORY THERAPY NOTE
Patient received on 6l nasal canula. All vital signs stable. Neb treatments given as ordered.  Will cont to monitor closely

## 2019-11-30 NOTE — PROGRESS NOTES
Heme/Onc Progress Note    Patient Name: Gypsy Rogers   YOB: 1940   Medical Record Number: [id number]   CSN: [id number]     Subjective: In ICU. Being diuresed. Plt stable at 30. No bleeding.        Objective:    Vitals:   11/30/19  0500 11/ Oral, Q15 Min PRN **OR** Glucose-Vitamin C (DEX-4) chewable tab 8 tablet, 8 tablet, Oral, Q15 Min PRN  •  albuterol sulfate (VENTOLIN) (2.5 MG/3ML) 0.083% nebulizer solution 2.5 mg, 2.5 mg, Nebulization, Q6H PRN  •  nystatin (MYCOSTATIN) suspension 500,000 unevaluable.     Labs:  Recent Results (from the past 24 hour(s))   POCT GLUCOSE    Collection Time: 11/29/19  8:10 AM   Result Value Ref Range    POC Glucose 103 (H) 70 - 99 mg/dL   POCT GLUCOSE    Collection Time: 11/29/19  9:12 AM   Result Value Ref Vadim Kahn Time: 11/29/19 11:00 PM   Result Value Ref Range    Glucose 169 (H) 70 - 99 mg/dL    Sodium 141 136 - 145 mmol/L    Potassium 3.3 (L) 3.5 - 5.1 mmol/L    Chloride 109 98 - 112 mmol/L    CO2 25.0 21.0 - 32.0 mmol/L    Anion Gap 7 0 - 18 mmol/L    BUN 52 (H) (L) >=60    GFR, -American 45 (L) >=60     (H) 15 - 37 U/L     (H) 16 - 61 U/L    Alkaline Phosphatase 235 (H) 45 - 117 U/L    Bilirubin, Total 2.0 0.1 - 2.0 mg/dL    Total Protein 6.1 (L) 6.4 - 8.2 g/dL    Albumin 2.1 (L) 3.4 - 5.0 g/ Collection Time: 11/30/19  7:07 AM   Result Value Ref Range    POC Glucose 133 (H) 70 - 99 mg/dL       Radiology:  CXR pending. Impression and Plan:  1)  Abd pain with nausea and some vomiting. Had partial stent obstruction - this has been revised.   Had

## 2019-11-30 NOTE — PROGRESS NOTES
[facility]  Progress Note     Gem Rise Patient Status:  Inpatient    11/3/1940 MRN [id number]   [facility] 4SW-A Attending Nano Fishman MD   Hosp Day # 5 PCP Rona Beasley MD     Chief Complaint: fever abd pain    S: Patie 24* 27* 30* 41*   GFRNAA 21*  21* 23* 26* 36*   CA 7.2*  7.2* 7.6* 7.6* 7.7*   ALB 2.1*  --  1.9* 2.1*     137 138 140 141   K 4.4  4.4 4.1 4.1 3.4*     107 108 109 112   CO2 21.0  21.0 23.0 21.0 26.0   ALKPHO 90  --  116 211*   *  --  4 7. Fatigue 2/2 sepsis  1. Caution po intake as d/w family and RN. Needs to be upright and more alert to start po.   8. Metabolic acidosis, improved w/ bicarb  9. Elevated ammonia  1. On lactulose here, ammonia prior 42. Had BM today  10.  Thrombocytopeni

## 2019-11-30 NOTE — PROGRESS NOTES
[facility]  Progress Note    Baljit Malik Patient Status:  Inpatient    11/3/1940 MRN [id number]   [facility] 4SW-A Attending Elis Hurtado MD   [facility] Day # 6 PCP Pantera Quintanilla MD       SUBJECTIVE:  No acute events overnight.   No s 11/28/19  2005 11/28/19  2105 11/28/19  2202 11/28/19  2358 11/29/19  0158 11/29/19  0404 11/29/19  0606 11/29/19  0810 11/29/19  0912 11/29/19  1008 11/29/19  1101 11/29/19  1200 11/29/19  1258 11/29/19  1409 11/29/19  1458 11/29/19  1603 11/29/19  1713 1 100 Units in sodium chloride 0.9% 100 mL infusion, 1-32 Units/hr, Intravenous, Continuous  glucose (DEX4) oral liquid 15 g, 15 g, Oral, Q15 Min PRN    Or  Glucose-Vitamin C (DEX-4) chewable tab 4 tablet, 4 tablet, Oral, Q15 Min PRN    Or  dextrose 50 % inj outside food can page if questions regarding carb coverage.      3. Sepsis - blood Cx positive for Klebsiella on admission. IV Abx per ID  4. Hypothyroidism - levothyroxine 100 mcg daily.  TSH NL 6/2019.  5. Pancreatic cancer with liver mets, elevated LFTs

## 2019-11-30 NOTE — PROGRESS NOTES
[facility]  Nephrology Progress Note    Denise Ardon Attending:  Peng Rinaldi MD       Assessment and Plan:    1) NEMO- due to dehydration / contrast nephropathy (CT 11/24) in setting of sepsis- improving; no other acute insults.  Diuresing well- c 1.65 11/30/2019    BUN 53 11/30/2019     11/30/2019    K 3.8 11/30/2019     11/30/2019    CO2 23.0 11/30/2019     11/30/2019    CA 8.3 11/30/2019    ALB 2.1 11/30/2019    ALKPHO 235 11/30/2019    BILT 2.0 11/30/2019    TP 6.1 11/30/2019 sulfate (VENTOLIN) (2.5 MG/3ML) 0.083% nebulizer solution 2.5 mg, 2.5 mg, Nebulization, Q6H PRN  nystatin (MYCOSTATIN) suspension 500,000 Units, 5 mL, Oral, QID  lactulose (CHRONULAC) 10 GM/15ML solution 10 g, 10 g, Oral, BID  Metoclopramide HCl (REGLAN) i

## 2019-11-30 NOTE — PLAN OF CARE
Received pt alert and oriented to person, place and situation. Forgetful to time. VSS. SR noted on monitor. Frequent PVC noted this evening. Shey ARBOLEDA notified. Labs ordered. Potassium and magnesium replaced per protocol.   Pt up to chair with sit-to-ciro Provide Smoking Cessation handout, if applicable  - Encourage broncho-pulmonary hygiene including cough, deep breathe, Incentive Spirometry  - Assess the need for suctioning and perform as needed  - Assess and instruct to report SOB or any respiratory diff

## 2019-11-30 NOTE — PHYSICAL THERAPY NOTE
PHYSICAL THERAPY EVALUATION - INPATIENT     Room Number: 465/465-A  Evaluation Date: 11/30/2019  Type of Evaluation: Initial  Physician Order: PT Eval and Treat    Presenting Problem: Septic Shock  Reason for Therapy: Mobility Dysfunction and Discharge P Jaundice    • Kidney stone    • Leg swelling    • Nausea    • Osteoarthritis    • Pain in joints    • Personal history of antineoplastic chemotherapy 09/2018   • PONV (postoperative nausea and vomiting)    • Presence of other cardiac implants and grafts pt lives with his spouse, who has suffered multiple CVA's and requires a great deal of support, and their dtr, Castillo Blunt, who acts as a full-time, live-in caregiver for the couple.   Typically the pt requires support from Castillo Blunt for medication, meals, payal within functional limits, grossly 5/5    BALANCE  Static Sitting: Fair  Dynamic Sitting: Fair -  Static Standing: Poor +  Dynamic Standing: Poor +    ADDITIONAL TESTS  Additional Tests: Elderly Mobility Scale     Elderly Mobility Scale: 4 required verbal cues for RW management and 1 standing rest break to dyspnea. Pt completed stand>sit to chair with Min A for eccentric control and verbal cues for hand placement and sequencing.     Exercise/Education Provided:  Bed mobility  Body mechanics Treatment Plan: Bed mobility; Endurance; Patient education; Family education;Gait training;Neuromuscular re-educate;Strengthening;Transfer training;Balance training  Rehab Potential : Good  Frequency (Obs): 3-5x/week  Number of Visits to Meet Established Goal

## 2019-11-30 NOTE — PROGRESS NOTES
[facility]                INFECTIOUS DISEASE PROGRESS NOTE    Aletha Escalante Patient Status:  Inpatient    11/3/1940 MRN [id number]   [facility] 4SW-A Attending Faiza Walker MD   [address] Day # 6 PCP Nga Coker MD     Antibioti 11/28/19 11:49 AM   Result Value Ref Range    Urine Culture No Growth at 18-24 hrs. N/A   2.  BLOOD CULTURE     Status: None (Preliminary result)    Collection Time: 11/27/19  6:37 AM   Result Value Ref Range    Blood Culture Result No Growth 3 Days N/A

## 2019-11-30 NOTE — PROGRESS NOTES
[facility]  Progress Note    Irvin Mohs Patient Status:  Inpatient    11/3/1940 MRN [id number]   [facility] 4SW-A Attending Jessica Palacio MD   [facility] Day # 6 PCP Nadeen Mercado MD     Subjective:  Irvin Mohs is a(n) 79 year 13.5   NEPRELIM 10.71*  --  14.49* 6.26   WBC 11.5*  --  15.8* 8.9   PLT 19.0* 30.0* 31.0* 30.0*     Recent Labs   Lab 11/29/19  0618 11/29/19  2300 11/30/19  0511   * 169* 174*   BUN 61* 52* 53*   CREATSERUM 1.76* 1.51* 1.65*   GFRAA 41* 50* 45*

## 2019-12-01 ENCOUNTER — APPOINTMENT (OUTPATIENT)
Dept: CT IMAGING | Facility: HOSPITAL | Age: 79
DRG: 919 | End: 2019-12-01
Attending: HOSPITALIST
Payer: MEDICARE

## 2019-12-01 PROCEDURE — 74176 CT ABD & PELVIS W/O CONTRAST: CPT | Performed by: HOSPITALIST

## 2019-12-01 PROCEDURE — 99233 SBSQ HOSP IP/OBS HIGH 50: CPT | Performed by: STUDENT IN AN ORGANIZED HEALTH CARE EDUCATION/TRAINING PROGRAM

## 2019-12-01 PROCEDURE — 99233 SBSQ HOSP IP/OBS HIGH 50: CPT | Performed by: INTERNAL MEDICINE

## 2019-12-01 PROCEDURE — 99232 SBSQ HOSP IP/OBS MODERATE 35: CPT | Performed by: INTERNAL MEDICINE

## 2019-12-01 PROCEDURE — 99497 ADVNCD CARE PLAN 30 MIN: CPT | Performed by: STUDENT IN AN ORGANIZED HEALTH CARE EDUCATION/TRAINING PROGRAM

## 2019-12-01 RX ORDER — MORPHINE SULFATE 4 MG/ML
1 INJECTION, SOLUTION INTRAMUSCULAR; INTRAVENOUS ONCE
Status: DISCONTINUED | OUTPATIENT
Start: 2019-12-01 | End: 2019-12-01

## 2019-12-01 RX ORDER — TRAMADOL HYDROCHLORIDE 50 MG/1
TABLET ORAL EVERY 8 HOURS PRN
Status: DISCONTINUED | OUTPATIENT
Start: 2019-12-01 | End: 2019-12-01 | Stop reason: SDUPTHER

## 2019-12-01 RX ORDER — TRAMADOL HYDROCHLORIDE 50 MG/1
50 TABLET ORAL EVERY 8 HOURS PRN
Status: DISCONTINUED | OUTPATIENT
Start: 2019-12-01 | End: 2019-12-10

## 2019-12-01 RX ORDER — SODIUM CHLORIDE 9 MG/ML
INJECTION, SOLUTION INTRAVENOUS CONTINUOUS
Status: DISCONTINUED | OUTPATIENT
Start: 2019-12-01 | End: 2019-12-03

## 2019-12-01 RX ORDER — MORPHINE SULFATE 4 MG/ML
1-2 INJECTION, SOLUTION INTRAMUSCULAR; INTRAVENOUS EVERY 4 HOURS PRN
Status: DISCONTINUED | OUTPATIENT
Start: 2019-12-01 | End: 2019-12-03

## 2019-12-01 RX ORDER — POTASSIUM CHLORIDE 20 MEQ/1
40 TABLET, EXTENDED RELEASE ORAL ONCE
Status: DISCONTINUED | OUTPATIENT
Start: 2019-12-01 | End: 2019-12-02

## 2019-12-01 RX ORDER — POTASSIUM CHLORIDE 20 MEQ/1
40 TABLET, EXTENDED RELEASE ORAL ONCE
Status: COMPLETED | OUTPATIENT
Start: 2019-12-01 | End: 2019-12-01

## 2019-12-01 RX ORDER — TRAMADOL HYDROCHLORIDE 50 MG/1
100 TABLET ORAL EVERY 8 HOURS PRN
Status: DISCONTINUED | OUTPATIENT
Start: 2019-12-01 | End: 2019-12-10

## 2019-12-01 NOTE — PROGRESS NOTES
[facility]  Progress Note     Gypsy Rogers Patient Status:  Inpatient    11/3/1940 MRN [id number]   [facility] 4SW-A Attending Carlos Peñaloza MD   [facility] Day # 7 PCP Francesca Mae MD     Chief Complaint: Sepsis     S: Patient  H GFRAA 41* 50* 45* 43*   GFRNAA 36* 43* 39* 37*   CA 7.7* 8.0* 8.3* 7.7*   ALB 2.1*  --  2.1* 1.9*    141 140 135*   K 3.4* 3.3* 3.8 3.8    109 108 103   CO2 26.0 25.0 23.0 26.0   ALKPHO 211*  --  235* 169*   *  --  126* 75*   * to TCC on discharge?: no  Estimated date of discharge: no  Discharge is dependent on: no  At this point Mr. Augustine Marinelli is expected to be discharge to: home    Plan of care discussed with pt, RN    Charlotte Young MD    Contacted by RN pt with 7/10 pain in lower

## 2019-12-01 NOTE — PROGRESS NOTES
[facility]  Nephrology Progress Note    Crispin Lopez Attending:  Gonzalez Mills MD       Assessment and Plan:    1) NEMO- due to dehydration / contrast nephropathy (CT 11/24) in setting of sepsis- improving; no other acute insults.  Diuresing well bu 12/01/2019    BUN 62 12/01/2019     12/01/2019    K 3.8 12/01/2019     12/01/2019    CO2 26.0 12/01/2019     12/01/2019    CA 7.7 12/01/2019    ALB 1.9 12/01/2019    ALKPHO 169 12/01/2019    BILT 1.4 12/01/2019    TP 5.3 12/01/2019    AS Q15 Min PRN    Or  Glucose-Vitamin C (DEX-4) chewable tab 8 tablet, 8 tablet, Oral, Q15 Min PRN  albuterol sulfate (VENTOLIN) (2.5 MG/3ML) 0.083% nebulizer solution 2.5 mg, 2.5 mg, Nebulization, Q6H PRN  nystatin (MYCOSTATIN) suspension 500,000 Units, 5 mL

## 2019-12-01 NOTE — PROGRESS NOTES
[facility]  Progress Note    Huong Coy Patient Status:  Inpatient    11/3/1940 MRN [id number]   [facility] 4SW-A Attending Harika Lopez MD   [facility] Day # 7 PCP Colleen Steinberg MD     Subjective:  Huong Coy is a(n) [de-identified] yea Lab 11/29/19  2300 11/30/19  0511 12/01/19  0514   * 174* 293*   BUN 52* 53* 62*   CREATSERUM 1.51* 1.65* 1.70*   GFRAA 50* 45* 43*   GFRNAA 43* 39* 37*   CA 8.0* 8.3* 7.7*    140 135*   K 3.3* 3.8 3.8    108 103   CO2 25.0 23.0 26.0

## 2019-12-01 NOTE — SIGNIFICANT EVENT
I was asked by my partner Dr. Cori Johnson to evaluate Mr. Fabi Torres.   Mr. Fabi Torres is a 79-year-old male with a history of pancreatic cancer with mets to liver, diabetes mellitus type 2, hypertension, hypothyroidism admitted earlier in the week with severe sepsis

## 2019-12-01 NOTE — SLP NOTE
SPEECH DAILY NOTE - INPATIENT    ASSESSMENT & PLAN   ASSESSMENT  Pt seen for meal assess/dysphagia therapy to monitor po tolerance of recommended diet and ensure adherence to aspiration precautions. Pt alert and sitting upright in chair.  2 L supplemental

## 2019-12-01 NOTE — PLAN OF CARE
Received report and assumed care of pt at 0730. Pt A+Ox3, disoriented to year. Vss. On 2L HFNC. Pt denies pain. Dr Rola Briggs notified of blood sugars in 300s- insulin adjusted accordingly, see EMAR. Pt up to chair with 1 assist for meals.  Report given to [facility]

## 2019-12-01 NOTE — RESPIRATORY THERAPY NOTE
Patient received on 2l nasal canula. All vital signs stable. Will attempt to wean o2. Neb treatments given as ordered.  Will cont to monitor

## 2019-12-01 NOTE — PROGRESS NOTES
Heme/Onc Progress Note    Patient Name: oFrd Salinas   YOB: 1940   Medical Record Number: [id number]   CSN: [id number]     Subjective: In ICU. Plt 34, HB 11.4, WBC 11. On IV diuresis. Thirst is main complaint.  States that his energy level Continuous PRN  •  dextrose 5 % and 0.9 % NaCl infusion, , Intravenous, Continuous PRN  •  traMADol HCl (ULTRAM) tab 50 mg, 50 mg, Oral, Q8H PRN  •  glucose (DEX4) oral liquid 15 g, 15 g, Oral, Q15 Min PRN **OR** Glucose-Vitamin C (DEX-4) chewable tab 4 ta sounds. Extremities: Pedal pulses are present. No edema. Neurological: Grossly intact. Lymphatics: There is no palpable lymphadenopathy throughout in the cervical, supraclavicular, axillary, or inguinal regions. Psych/Depression: unevaluable.     Labs: Collection Time: 12/01/19  5:14 AM   Result Value Ref Range    Phosphorus 3.4 2.5 - 4.9 mg/dL   CBC W/ DIFFERENTIAL    Collection Time: 12/01/19  5:14 AM   Result Value Ref Range    WBC 11.0 4.0 - 11.0 x10(3) uL    RBC 3.65 (L) 3.80 - 5.80 x10(6)uL    H being diuresed.    2)  Pain - prn narcotics.       3)  Diabetes - Insulin drip started.    4)  Fever - as above.     5)  Oral candidiasis - due to all of the above. on nystatin.    6)  Thrombocytopenia - stable - likely consumptive. HIPA negative.  No

## 2019-12-01 NOTE — PROGRESS NOTES
[facility]  Progress Note    Cindy Hou Patient Status:  Inpatient    11/3/1940 MRN [id number]   [facility] 4SW-A Attending Gemma Reveles MD   [address] Day # 7 PCP Marychuy West MD       SUBJECTIVE:  No acute events overnight.  + si 11/28/19  0600 11/28/19  0705 11/28/19  0808 11/28/19  0857 11/28/19  1000 11/28/19  1103 11/28/19  1210 11/28/19  1413 11/28/19  1504 11/28/19  1620 11/28/19  1717 11/28/19  1814 11/28/19  1906 11/28/19  2005 11/28/19  2105 11/28/19  2202 11/28/19  2358 1 per day  melatonin tab TABS 3 mg, 3 mg, Oral, Nightly  pancrelipase (Lip-Prot-Amyl) (ZENPEP) DR particles cap 20,000 Units, 20,000 Units, Oral, TID CC  lidocaine (UROJET) 2 % urethral jelly 20 mL, 20 mL, Urethral, PRN  norepinephrine (LEVOPHED) 4 mg/250 ml Daily with breakfast        Assessment and Plan:   [de-identified]year old male here for     1. Uncontrolled T2DM with hyperglycemia  2.  Long term insulin use  - A1c 9.2 on lantus 60 units daily and humalog 25-30 units TID w/ meals   -increase levemir 35 units q24h gi

## 2019-12-02 ENCOUNTER — APPOINTMENT (OUTPATIENT)
Dept: ULTRASOUND IMAGING | Facility: HOSPITAL | Age: 79
DRG: 919 | End: 2019-12-02
Attending: NURSE PRACTITIONER
Payer: MEDICARE

## 2019-12-02 ENCOUNTER — APPOINTMENT (OUTPATIENT)
Dept: GENERAL RADIOLOGY | Facility: HOSPITAL | Age: 79
DRG: 919 | End: 2019-12-02
Attending: INTERNAL MEDICINE
Payer: MEDICARE

## 2019-12-02 PROBLEM — R10.9 ABDOMINAL PAIN: Status: ACTIVE | Noted: 2019-11-24

## 2019-12-02 PROCEDURE — 74019 RADEX ABDOMEN 2 VIEWS: CPT | Performed by: INTERNAL MEDICINE

## 2019-12-02 PROCEDURE — 49083 ABD PARACENTESIS W/IMAGING: CPT | Performed by: NURSE PRACTITIONER

## 2019-12-02 PROCEDURE — 99232 SBSQ HOSP IP/OBS MODERATE 35: CPT | Performed by: INTERNAL MEDICINE

## 2019-12-02 PROCEDURE — 0W9G3ZZ DRAINAGE OF PERITONEAL CAVITY, PERCUTANEOUS APPROACH: ICD-10-PCS | Performed by: RADIOLOGY

## 2019-12-02 PROCEDURE — 99233 SBSQ HOSP IP/OBS HIGH 50: CPT | Performed by: INTERNAL MEDICINE

## 2019-12-02 PROCEDURE — 99233 SBSQ HOSP IP/OBS HIGH 50: CPT | Performed by: STUDENT IN AN ORGANIZED HEALTH CARE EDUCATION/TRAINING PROGRAM

## 2019-12-02 RX ORDER — ALPRAZOLAM 0.5 MG/1
0.5 TABLET ORAL 2 TIMES DAILY PRN
Status: DISCONTINUED | OUTPATIENT
Start: 2019-12-02 | End: 2019-12-03

## 2019-12-02 NOTE — PROGRESS NOTES
After speaking w/physician family agreed to CT scan and MSO4 and pt medicated prior to CT. Dr Su Garcia endocrinology notified of pt  and NPO status.  To continue w/levemir 35 units as ordered and current novolog sliding scale-correction scale-no carb markus

## 2019-12-02 NOTE — PROGRESS NOTES
[facility]  Progress Note     [place] [facility] Patient Status:  Inpatient    11/3/1940 MRN [id number]   [facility] 4SW-A Attending Morelia Gil MD   [facility] Day # 8 PCP Niecy Walker MD     Chief Complaint: Sepsis     S: Patient re BUN 53* 62* 54*   CREATSERUM 1.65* 1.70* 1.51*   GFRAA 45* 43* 50*   GFRNAA 39* 37* 43*   CA 8.3* 7.7* 7.9*   ALB 2.1* 1.9* 1.9*    135* 139   K 3.8 3.8 4.0    103 107   CO2 23.0 26.0 27.0   ALKPHO 235* 169* 144*   * 75* 50*   * SCD   · CODE status: FULL  · Patterson: no  · Central line: no    Will the patient be referred to TCC on discharge?: no    Estimated date of discharge: tbd    Plan of care discussed with pt, dtr, RN, GI, MD Vanesa FOX  10:06 AM

## 2019-12-02 NOTE — IMAGING NOTE
Pt tolerated procedure well. Vital signs stable. tegaderm dressing to RLQ lateral aspect , CDI. SBAR given to Yana crawford RN at 1640. Pt transported to his inpatient room by transporter.

## 2019-12-02 NOTE — DIETARY NOTE
[facility]     NUTRITION FOLLOW UP ASSESSMENT     Pt does not meet malnutrition criteria.     NUTRITION DIAGNOSIS/PROBLEM:     Inadequate energy intake related to decreased ability to consume sufficient energy intake as evidenced by current NPO status PHYSICAL FINDINGS:   Unable to perform nutrition focused physical exam at this time.      NUTRITION PRESCRIPTION:  Calories: 0308-1576 calories/day (20-22 calories per kg)  Protein: 125-142 grams protein/day (1.5-1.7 grams protein per kg of IBW)  Fluid: ~1

## 2019-12-02 NOTE — PROGRESS NOTES
[facility]                INFECTIOUS DISEASE PROGRESS NOTE    Desiree Wang Patient Status:  Inpatient    11/3/1940 MRN [id number]   [facility] 4SW-A Attending Nithya Steen MD   [facility] Day # 8 PCP Cici Gan MD     Antibioti Result Value Ref Range    Urine Culture No Growth at 18-24 hrs. N/A   2.  BLOOD CULTURE     Status: None    Collection Time: 11/27/19  6:37 AM   Result Value Ref Range    Blood Culture Result No Growth 5 Days N/A         Problem list reviewed:  Patient Ac

## 2019-12-02 NOTE — PROGRESS NOTES
[facility]  Progress Note     [place] Patient Status:  Inpatient    11/3/1940 MRN [id number]   [facility] 4SW-A Attending Monica Garcia MD   [address] Day # 8 PCP Jasmine Richardson MD     Chief Complaint: Sepsis     S: Patient v  103 107   CO2 23.0 26.0 27.0   ALKPHO 235* 169* 144*   * 75* 50*   * 145* 81*   BILT 2.0 1.4 1.9   TP 6.1* 5.3* 5.8*       Estimated Creatinine Clearance: 42.9 mL/min (A) (based on SCr of 1.51 mg/dL (H)).     Recent Labs   Lab 11/27/1 Mr. Mandi Russell is expected to be discharge to: home    Plan of care discussed with pt, RN.  Pt dtr, Adebayo Arambula MD

## 2019-12-02 NOTE — PROGRESS NOTES
Dr Davis Catrachita reconsulted and ID reconsulted per Dr Jian Garibay. Dr Yessy Flores paged re possible Dextrose IVF d/t npo and levemir dose but not necessary per Dr Yessy Flores. Orders rec'd to call for BS <100.

## 2019-12-02 NOTE — PROGRESS NOTES
C/o lower abd pain/pressure and medicated w/tramadol. BS hypoactive. Pt states not passing gas, abd distended. Attempted position change and sitting up on BSC w/o relief. No relief from tramadol and rates pain at 7 on pain scale.  Per pts daughter his pain

## 2019-12-02 NOTE — OCCUPATIONAL THERAPY NOTE
OCCUPATIONAL THERAPY EVALUATION - INPATIENT     Room Number: 404/404-A  Evaluation Date: 12/2/2019  Type of Evaluation: Initial  Presenting Problem: (sepsis, ERCP with stone extraction, ascites)    Physician Order: IP Consult to Occupational Therapy  Sylvia Fever    • Flatulence/gas pain/belching    • Frequent urination    • High blood pressure    • History of blood transfusion 2016   • Irregular bowel habits    • Jaundice    • Kidney stone    • Leg swelling    • Nausea    • Osteoarthritis    • Pain in joints level; Other (Comment)(chair lift)  Lives With: Spouse;Daughter(dtr is paid caregiver for the patient)    Toilet and Equipment: Comfort height toilet  Shower/Tub and Equipment: Walk-in shower; Tub-shower combo;Grab bar; Shower chair  Other Equipment: LongTLM Commazariegos limits     Upper extremity strength:  grossly 3+/5    COORDINATION  Gross Motor    WFL    Fine Motor    WFL      ADDITIONAL TESTS                                    NEUROLOGICAL FINDINGS                   ACTIVITY TOLERANCE                         O2 SATUR history and occupational profile noted above. Functional outcome measures completed include .  In this OT evaluation patient presents with the following performance deficits: weakness, impaired balance, decreased functional reach, decreased knowledge of ada dressing:  with stand by assist  Patient will perform lower body dressing:  with min assist, with adaptive equipment PRN and with cues  Patient will perform toileting: with min assist    Functional Transfer Goals  Patient will transfer to toilet:  with min

## 2019-12-02 NOTE — PLAN OF CARE
Pt a/o x4. Fatigue. NPO. C/o abdominal pain but denies pain med. Ivf infusing. Iv abx given. Pt had x1 soft stool overnight. Stool sent for C- diff. Lab called not testing stool for C- diff because pt had formed stool. Will monitor.  Pt and his family upda

## 2019-12-02 NOTE — SLP NOTE
Attempted follow up, note patient NPO at this time. Will hold and continue to follow.     Donald Livingston Jesus 86 CCC-SLP  Pager [phone]

## 2019-12-02 NOTE — PROGRESS NOTES
[facility]  Progress Note    [place] Patient Status:  Inpatient    11/3/1940 MRN [id number]   [facility] 4NW-A Attending Monica Garcia MD   [facility] Day # 8 PCP Jasmine Richardson MD     STATUS UPDATE: Beginning yesterday afternoon, mildly tender, non-distended although the flanks are bulging, no masses, no guarding, no   rebound, positive BS   Extremity: 1+ dependent edema, no cyanosis   Neurological: Alert, interactive, no focal deficits    Lab Data Review:  Recent Labs   Lab 11/30/ obstruction. Stents of colonic diverticulosis. Slight increase in bilateral pleural effusions. Medications reviewed. Assessment:  1.  Septic shock-likely source from the right upper quadrant/pancreas-biliary system.  Blood cultures positive for Kl

## 2019-12-02 NOTE — PROGRESS NOTES
[facility] Pharmacy Note: Antimicrobial Weight Based Dose Adjustment for: piperacillin/tazobactam (Chidi Hedge)    Avery Gutiérrez is a [de-identified]year old male who has been prescribed piperacillin/tazobactam (ZOSYN) 3.375 g every 8 hours.     Estimated Creatinine Clearance:

## 2019-12-02 NOTE — PROGRESS NOTES
Heme/Onc Progress Note    Patient Name: Zehra Emery   YOB: 1940   Medical Record Number: [id number]   CSN: [id number]   Attending Physician: Kimberly Allen M.D. Subjective:  Less abd pain today. Going for paracentesis soon.   No f (PITRESSIN) 20 Units in sodium chloride 0.9% 50 mL infusion for shock, 0.04 Units/min, Intravenous, Continuous PRN  •  dextrose 5 % and 0.9 % NaCl infusion, , Intravenous, Continuous PRN  •  glucose (DEX4) oral liquid 15 g, 15 g, Oral, Q15 Min PRN **OR** G JVD. No palpable lymphadenopathy. Neck is supple. Chest: Clear to auscultation. Heart: Regular rate and rhythm. Abdomen: Soft, non tender with good bowel sounds. Moderately distended. Extremities: Pedal pulses are present. No edema.   Neurological: Gr Phosphorus 3.8 2.5 - 4.9 mg/dL   CBC W/ DIFFERENTIAL    Collection Time: 12/02/19  6:40 AM   Result Value Ref Range    WBC 14.1 (H) 4.0 - 11.0 x10(3) uL    RBC 3.93 3.80 - 5.80 x10(6)uL    HGB 12.4 (L) 13.0 - 17.5 g/dL    HCT 37.1 (L) 39.0 - 53.0 %    PLT per primary service.    4)  Fever - as above.     5)  Oral candidiasis - due to all of the above.  on nystatin.    6)  Thrombocytopenia - improving. Resolving sepsis.    7)  Pancreatic cancer - Further RX to be discussed once stabilized.      8)  Ascite

## 2019-12-02 NOTE — PROGRESS NOTES
[facility]  Nephrology Progress Note    Jose Alfredo Le Attending:  Cori Hardwick MD       Assessment and Plan:    1) NEMO- due to dehydration / contrast nephropathy (CT 11/24) in setting of sepsis- improving; no other acute insults.  Remains edematous organomegaly  Extremities: Without clubbing, cyanosis; + edema  Neurologic: Cranial nerves grossly intact, moving all extremities  Skin: Warm and dry, no rashes       Labs:   Lab Results   Component Value Date    WBC 14.1 12/02/2019    HGB 12.4 12/02/2019 PRN  norepinephrine (LEVOPHED) 4 mg/250 ml premix infusion, 0.5-30 mcg/min, Intravenous, Continuous PRN  vasopressin (PITRESSIN) 20 Units in sodium chloride 0.9% 50 mL infusion for shock, 0.04 Units/min, Intravenous, Continuous PRN  dextrose 5 % and 0.9 %

## 2019-12-02 NOTE — CM/SW NOTE
SW met w/pt and pt's daughter who confirmed they would like the pt to go to [address] [facility] consulted PMR and sent the referral to Susana Beckwith.

## 2019-12-02 NOTE — PROGRESS NOTES
[facility]  Progress Note    Ajay Lawson Patient Status:  Inpatient    11/3/1940 MRN [id number]   [facility] 4NW-A Attending Yuliana Beasley MD   [facility] Day # 8 PCP Jonel Gomez MD       SUBJECTIVE:  Worsening abd pain overnight. 11/30/19  0223 11/30/19  0303 11/30/19  0409 11/30/19  0456 11/30/19  0609 11/30/19  0707 11/30/19  0808 11/30/19  0902 11/30/19  1001 11/30/19  1105 11/30/19  1205 11/30/19  1305 11/30/19  1403 11/30/19  1503 11/30/19  1603 11/30/19  1704 11/30/19  1832 1 chewable tab 4 tablet, 4 tablet, Oral, Q15 Min PRN    Or  dextrose 50 % injection 50 mL, 50 mL, Intravenous, Q15 Min PRN    Or  glucose (DEX4) oral liquid 30 g, 30 g, Oral, Q15 Min PRN    Or  Glucose-Vitamin C (DEX-4) chewable tab 8 tablet, 8 tablet, Oral, with patient/family at bedside. All questions/concerns addressed as fully as possible. I will continue to follow closely while the patient is in the hospital.  Please feel free to contact me with any questions or concerns.       Teresa Lam MD

## 2019-12-02 NOTE — CONSULTS
[address] [facility] Patient Status:  Inpatient    11/3/1940 MRN [id number]   [facility] 4NW-A Attending Bolivar Samson MD   [facility] Day # 8 PCP Morena Sagastume MD     Patient Identification  Crispin Lopez is a [de-identified]year old abscess formation based on significant interval change since 11/27/2019 CT. Differential includes worsening   metastatic disease. Common bile duct stent is in place.      There is minimal pericolonic stranding surrounding normal caliber ascending and d Thyroid disease    • Type II or unspecified type diabetes mellitus without mention of complication, not stated as uncontrolled    • Unspecified essential hypertension    • Visual impairment     glasses   • Wears glasses        Past Surgical History:   Proc Continuous  [COMPLETED] potassium chloride IVPB premix 20 mEq, 20 mEq, Intravenous, Once  Pantoprazole Sodium (PROTONIX) EC tab 40 mg, 40 mg, Oral, QAM AC  Insulin Aspart Pen (NOVOLOG) 100 UNIT/ML flexpen 0-60 Units, 0-60 Units, Subcutaneous, TID CC  Insul Intravenous, Q15 Min PRN    Or  glucose (DEX4) oral liquid 30 g, 30 g, Oral, Q15 Min PRN    Or  Glucose-Vitamin C (DEX-4) chewable tab 8 tablet, 8 tablet, Oral, Q15 Min PRN  albuterol sulfate (VENTOLIN) (2.5 MG/3ML) 0.083% nebulizer solution 2.5 mg, 2.5 mg breakfast  Venlafaxine HCl ER (EFFEXOR-XR) 24 hr cap 150 mg, 150 mg, Oral, Daily  Alfuzosin HCl ER (UROXATRAL) 24 hr tab 10 mg, 10 mg, Oral, Daily with breakfast        Social History    Tobacco Use      Smoking status: Never Smoker      Smokeless tobacco: enlargement/tenderness/nodules. Lungs:   Resonant, clear breath sounds, quiet accessory muscles. Chest wall:  No tenderness or deformity. Cardiovascular:  Heart with regular rate rhythm, no murmurs appreciated. Radial and pedal pulses good.  No cy device needs. Please consider Palliative consult as well!         24 hr rehab nursing also beneficial for medication management, pressure sore prevention, bowel and bladder management, skin management.      Physiatric medical oversight to monitor kidney f

## 2019-12-02 NOTE — PROGRESS NOTES
Gastroenterology Progress Note  Archana Bennett Patient Status:  Inpatient    11/3/1940 MRN [id number]   [facility] 4NW-A Attending [facility], [address] [facility] [address] Day # 8 PCP Mariana Sommers MD 31.0* 30.0* 32.0* 55.0*       Recent Labs   Lab 11/29/19  0618 11/29/19  2300 11/30/19  0511 12/01/19  0514 12/02/19  0640    141 140 135* 139   K 3.4* 3.3* 3.8 3.8 4.0    109 108 103 107   CO2 26.0 25.0 23.0 26.0 27.0   BUN 61* 52* 53* 62* 54* surgical clips at the head of the pancreas. SPLEEN:  Normal caliber. KIDNEYS:  No hydronephrosis. Stable perinephric stranding and renal cysts largest in the left kidney measuring 2.3 cm.    ADRENALS:  Normal.  AORTA/VASCULAR:  Aortoiliac calcification Farrah Damico MD on 12/01/2019 at 18:21     ________________________________________________________________________________________    PROCEDURE:  CT ABDOMEN+PELVIS (EPE=16175)     COMPARISON:  [facility] , CT, CT CHEST+ABDOMEN+PELVIS(ALL CNTRST ONLY)(CPT=71260/7417 AORTA/VASCULAR:    Unremarkable as seen on non-contrast imaging. RETROPERITONEUM:  No mass or adenopathy. BOWEL/MESENTERY:  No visible mass, obstruction, or bowel wall thickening. Diverticulosis. No evidence of acute diverticulitis.   No abdominal occlusion s/p covered metal biliary stent placement. Remains on abx per ID. Pain now in lower abd, mild to moderate in nature. Will plan for paracentesis given distention and new ascites on CT with fluid analysis to r/o SBP, biliary leak, malignancy    2. onset ascites  - agree with paracentesis as above  - stool studies if diarrhea occurs     Nava Blount, 12/02/19, 12:21 PM  [facility]  [phone]

## 2019-12-03 PROCEDURE — 99232 SBSQ HOSP IP/OBS MODERATE 35: CPT | Performed by: INTERNAL MEDICINE

## 2019-12-03 PROCEDURE — 99233 SBSQ HOSP IP/OBS HIGH 50: CPT | Performed by: STUDENT IN AN ORGANIZED HEALTH CARE EDUCATION/TRAINING PROGRAM

## 2019-12-03 PROCEDURE — 99233 SBSQ HOSP IP/OBS HIGH 50: CPT | Performed by: INTERNAL MEDICINE

## 2019-12-03 RX ORDER — IPRATROPIUM BROMIDE AND ALBUTEROL SULFATE 2.5; .5 MG/3ML; MG/3ML
3 SOLUTION RESPIRATORY (INHALATION) EVERY 4 HOURS PRN
Status: DISCONTINUED | OUTPATIENT
Start: 2019-12-03 | End: 2019-12-10

## 2019-12-03 RX ORDER — MORPHINE SULFATE 4 MG/ML
INJECTION, SOLUTION INTRAMUSCULAR; INTRAVENOUS EVERY 4 HOURS PRN
Status: DISCONTINUED | OUTPATIENT
Start: 2019-12-03 | End: 2019-12-04

## 2019-12-03 RX ORDER — FUROSEMIDE 10 MG/ML
40 INJECTION INTRAMUSCULAR; INTRAVENOUS
Status: DISCONTINUED | OUTPATIENT
Start: 2019-12-03 | End: 2019-12-05

## 2019-12-03 RX ORDER — POTASSIUM CHLORIDE 29.8 MG/ML
40 INJECTION INTRAVENOUS ONCE
Status: COMPLETED | OUTPATIENT
Start: 2019-12-03 | End: 2019-12-03

## 2019-12-03 NOTE — PHYSICAL THERAPY NOTE
PHYSICAL THERAPY TREATMENT NOTE - INPATIENT    Room Number: 456/829-I     Session: 2/5   Number of Visits to Meet Established Goals: 5    Presenting Problem: Septic Shock     History related to current admission: Pt is [de-identified]year old male admitted on 11/24/2 • Osteoarthritis    • Pain in joints    • Personal history of antineoplastic chemotherapy 09/2018   • PONV (postoperative nausea and vomiting)    • Presence of other cardiac implants and grafts    • Prostatitis    • Renal disorder    • Skin cancer    • S abdomen  Management Techniques: Activity promotion; Body mechanics;Repositioning    BALANCE                                                                                                                     Static Sitting: Fair -  Dynamic Sitting: Fair - See above flow sheet  Chair Follow: YES  Sit<>Supine: NA  Stand<>Sit: Min A (cues for hand placement)    Comments related to Mobility: Pt required extra time for all functional mobility, and wanting a lot of \"drink breaks\".   Pt had multiple MD's arrive f 3-5x/week    CURRENT GOALS     Goal #1 Patient is able to demonstrate supine - sit EOB @ level: minimum assistance-met, updated to CGA      Goal #2 Patient is able to demonstrate transfers Sit to/from Stand at assistance level: moderate assistance-met upda

## 2019-12-03 NOTE — PROGRESS NOTES
Pt went for paracentesis and took 1.8 liters of fluid off and bandaid d/i.  Pt tolerated some liquids for dinner

## 2019-12-03 NOTE — PROGRESS NOTES
[facility]                INFECTIOUS DISEASE PROGRESS NOTE    Susan Wilhelm Patient Status:  Inpatient    11/3/1940 MRN [id number]   [facility] 4SW-A Attending Vaughn Godinez MD   [facility] Day # 9 PCP Swetha Haider MD     Antibioti Specimen:  Body fluid, unspecified Updated: 12/02/19 1918    Neutrophils Peritoneal 89 %     Lymphocyte Peritoneal 1 %     Mono/Mac/Histio Peritoneal 8 %     Eosinophils Peritoneal 0 %     Basophil Peritoneal 0 %     Mesothelial Peritoneal 2 %     Total C NEMO (acute kidney injury) ([facility] [address]     ATN (acute tubular necrosis) (HCC)     Cholangitis     Peritonitis (HCC)      ASSESSMENT/PLAN:  1.  Klebsiella bacteremia/cholangitis/obstructed stent  SP ERCP 11/25  SP Paracentesis - 12/2    Blood culture 11/24: Oscar Pina

## 2019-12-03 NOTE — SLP NOTE
SPEECH DAILY NOTE - INPATIENT    ASSESSMENT & PLAN   ASSESSMENT  Pt seen for dysphagia tx to assess tolerance with recommended diet, ensure proper utilization of aspiration precautions and provide pt/family education. Patient alert and up in chair.   He re

## 2019-12-03 NOTE — PROGRESS NOTES
[facility] H[facility]PITAL[facility]  Progress Note     Irvin Mohs Patient Status:  Inpatient    11/3/1940 MRN [id number]   [facility] 4SW-A Attending Em Ness MD   [facility] Day # 9 PCP Nadeen Mercado MD     Chief Complaint: Sepsis     S: Patient  7.5*   ALB 1.9* 1.9* 1.6*   * 139 142   K 3.8 4.0 3.7    107 108   CO2 26.0 27.0 29.0   ALKPHO 169* 144* 117   AST 75* 50* 25   * 81* 49   BILT 1.4 1.9 1.9   TP 5.3* 5.8* 5.6*       Estimated Creatinine Clearance: 47 mL/min (A) (based on Prophylaxis: SCD   · CODE status: FULL  · [place]: no  · Central line: no    Will the patient be referred to TCC on discharge?: no  Estimated date of discharge: no  Discharge is dependent on: no  At this point Mr. Clarke Espinal is expected to be discharge to: home

## 2019-12-03 NOTE — PROGRESS NOTES
Gastroenterology Progress Note  Denisejessica Ardon Patient Status:  Inpatient    11/3/1940 MRN [id number]   [facility] 4NW-A Attending Martin Navarro MD   [facility] Day # 9 PCP Yoel Jean Baptiste MD to the pubic symphysis. Dose reduction techniques were used. Dose information is transmitted to the [address] of Radiology) NRDR ([address]) which includes the Dose Index Registry.      PATIENT STATED HISTORY: (As trans =====  CONCLUSION:  Interval increase in abdominal and pelvic ascites since 11/27/2019.      Interval increase in areas of decreased attenuation which are ill-defined involving the dome and the medial and lateral segments of the left lobe of the liver ext LIVER:  There are peripheral air collections seen in the subcapsular portion of the left lobe of. These are very peripheral and there is a large amount of air within this area. Portal venous gas cannot be excluded.   This could still be biliary but it Negative.    =====  CONCLUSION:  Bilateral dependent atelectasis. Air within the biliary tree as noted above.   There is a larger collection of air  in the subcapsular portion of the liver which is more than that generally seen with intra biliary air

## 2019-12-03 NOTE — PROGRESS NOTES
[facility]  Progress Note    Aletha Escalante Patient Status:  Inpatient    11/3/1940 MRN [id number]   [facility] 4NW-A Attending Hanna Blakely MD   [facility] Day # 9 PCP Nga Coker MD     Subjective:  Aletha Escalante is a(n) [de-identified] yea 34.0* 37.1* 33.4*   MCV 93.2 94.4 95.2   MCH 31.2 31.6 31.3   MCHC 33.5 33.4 32.9   RDW 13.7 14.0 14.3   NEPRELIM 8.72* 11.51* 11.26*   WBC 11.0 14.1* 13.3*   PLT 32.0* 55.0* 81.0*     Recent Labs   Lab 12/01/19  0514 12/02/19  0640 12/03/19  0339   GLU 29 low platelets    Diandra Sorensen .  12/3/2019  9:33 AM

## 2019-12-03 NOTE — PROGRESS NOTES
[facility]  Nephrology Progress Note    Archana Bennett Attending:  Matthias Plata MD       Assessment and Plan:    1) NEMO- due to dehydration / contrast nephropathy (CT 11/24) in setting of sepsis- improving; no other acute insults.  Remains edematous 12/03/2019    HGB 11.0 12/03/2019    HCT 33.4 12/03/2019    PLT 81.0 12/03/2019    CREATSERUM 1.38 12/03/2019    BUN 42 12/03/2019     12/03/2019    K 3.7 12/03/2019     12/03/2019    CO2 29.0 12/03/2019     12/03/2019    CA 7.5 12/03/20 tablet, Oral, Q15 Min PRN    Or  dextrose 50 % injection 50 mL, 50 mL, Intravenous, Q15 Min PRN    Or  glucose (DEX4) oral liquid 30 g, 30 g, Oral, Q15 Min PRN    Or  Glucose-Vitamin C (DEX-4) chewable tab 8 tablet, 8 tablet, Oral, Q15 Min PRN  albuterol s

## 2019-12-03 NOTE — PROGRESS NOTES
[facility]PITAL[facility]  Progress Note     Vicky Sania Patient Status:  Inpatient    11/3/1940 MRN [id number]   [facility] 4SW-A Attending Peewee Russ MD   [facility] Day # 9 PCP Saqib Mccord MD     Chief Complaint: Sepsis     S: Patient  49 9  --   --  7 9  --   --   --    INR  --  1.71*  --   --   --   --   --   --   --  1.42*  --     < > = values in this interval not displayed.        Recent Labs   Lab 12/01/19  0514 12/02/19  0640 12/03/19  0339   * 189* 137*   BUN 62* 54* 42*   C lasix IV BID, IS/increase activity  5. Transaminitis, resolved  6. NEMO, Due to sepsis, contrast, resolving  7. Hypertension, mildly elevated  1. On diuresis, off lisinopril due to recent nemo  8. Pancreatic ca with liver mets   1. onc following  9.  Hypothyr

## 2019-12-03 NOTE — PROGRESS NOTES
Heme/Onc Progress Note    Patient Name: Dario Spap   YOB: 1940   Medical Record Number: [id number]   CSN: [id number]   Attending Physician: Sarah Frost M.D. Subjective:  Gradually improving.   Abd pain is less - still requiring 20 mL, 20 mL, Urethral, PRN  •  norepinephrine (LEVOPHED) 4 mg/250 ml premix infusion, 0.5-30 mcg/min, Intravenous, Continuous PRN  •  vasopressin (PITRESSIN) 20 Units in sodium chloride 0.9% 50 mL infusion for shock, 0.04 Units/min, Intravenous, Continuou Wt 114.9 kg (253 lb 4.8 oz)   SpO2 94%   BMI 34.26 kg/m²   HEENT: EOMs intact. PERRL. Oropharynx is clear. Neck: No JVD. No palpable lymphadenopathy. Neck is supple. Chest: Clear to auscultation. Heart: Regular rate and rhythm.    Abdomen: Soft, minimal Total Cells Counted 100    POCT GLUCOSE    Collection Time: 12/02/19  6:04 PM   Result Value Ref Range    POC Glucose 255 (H) 70 - 99 mg/dL   POCT GLUCOSE    Collection Time: 12/02/19  9:04 PM   Result Value Ref Range    POC Glucose 350 (H) 70 - 99 mg/dL Basophil % 0.2 %    Immature Granulocyte % 0.8 %   POCT GLUCOSE    Collection Time: 12/03/19  7:39 AM   Result Value Ref Range    POC Glucose 118 (H) 70 - 99 mg/dL       Radiology:  PROCEDURE:  US PARACENTESIS ABDOMEN W IMAGING  ([id number])     COMPARISON: Hematology Oncology [facility] [address] [facility] [address]  [address] [facility], [place]

## 2019-12-03 NOTE — OCCUPATIONAL THERAPY NOTE
OCCUPATIONAL THERAPY TREATMENT NOTE - INPATIENT     Room Number: 840/636-S  Session: 1   Number of Visits to Meet Established Goals: 5    Presenting Problem: (sepsis, ERCP with stone extraction, ascites)    History related to current admission: Pt is 79 ye pressure    • History of blood transfusion 2016   • Irregular bowel habits    • Jaundice    • Kidney stone    • Leg swelling    • Nausea    • Osteoarthritis    • Pain in joints    • Personal history of antineoplastic chemotherapy 09/2018   • PONV (postoper RESTRICTION  Weight Bearing Restriction: None                PAIN ASSESSMENT  Ratin  Location: abdomen   Management Techniques:  Activity promotion     ACTIVITY TOLERANCE                         O2 SATURATIONS                ACTIVITIES OF DAILY LIVING A limited d/t decreased activity tolerance, pacing, safety, posture, and use of adaptive techniques, balance and general decreased strength. Patient will benefit from continued skilled OT intervention to maximize ADL I and safety.     OT Discharge Recommenda

## 2019-12-03 NOTE — PROGRESS NOTES
[facility]  Progress Note    Ida Vega Patient Status:  Inpatient    11/3/1940 MRN [id number]   [facility] 4NW-A Attending Kalyn Roy MD   [facility] Day # 9 PCP Ani Giron MD       SUBJECTIVE:  Underwent 1.8L paracentesis ye 255* 350*       Meds:   ALPRAZolam (XANAX) tab 0.5 mg, 0.5 mg, Oral, BID PRN  insulin detemir (LEVEMIR) 100 UNIT/ML flextouch 35 Units, 35 Units, Subcutaneous, Q24H  traMADol HCl (ULTRAM) tab 50 mg, 50 mg, Oral, Q8H PRN    Or  traMADol HCl (ULTRAM) tab 100 injection 5 mg, 5 mg, Intravenous, Q8H PRN  hydrALAzine HCl (APRESOLINE) injection 10 mg, 10 mg, Intravenous, Q6H PRN  acetaminophen (TYLENOL) tab 650 mg, 650 mg, Oral, Q6H PRN  docusate sodium (COLACE) cap 100 mg, 100 mg, Oral, BID  PEG 3350 (MIRALAX) pow

## 2019-12-04 ENCOUNTER — APPOINTMENT (OUTPATIENT)
Dept: GENERAL RADIOLOGY | Facility: HOSPITAL | Age: 79
DRG: 919 | End: 2019-12-04
Attending: INTERNAL MEDICINE
Payer: MEDICARE

## 2019-12-04 PROCEDURE — 71045 X-RAY EXAM CHEST 1 VIEW: CPT | Performed by: INTERNAL MEDICINE

## 2019-12-04 PROCEDURE — 99232 SBSQ HOSP IP/OBS MODERATE 35: CPT | Performed by: NURSE PRACTITIONER

## 2019-12-04 PROCEDURE — 99233 SBSQ HOSP IP/OBS HIGH 50: CPT | Performed by: INTERNAL MEDICINE

## 2019-12-04 PROCEDURE — 99232 SBSQ HOSP IP/OBS MODERATE 35: CPT | Performed by: HOSPITALIST

## 2019-12-04 RX ORDER — HYDROCODONE BITARTRATE AND ACETAMINOPHEN 10; 325 MG/1; MG/1
1 TABLET ORAL EVERY 6 HOURS PRN
Status: DISCONTINUED | OUTPATIENT
Start: 2019-12-04 | End: 2019-12-10

## 2019-12-04 RX ORDER — POTASSIUM CHLORIDE 29.8 MG/ML
40 INJECTION INTRAVENOUS ONCE
Status: COMPLETED | OUTPATIENT
Start: 2019-12-04 | End: 2019-12-04

## 2019-12-04 RX ORDER — MORPHINE SULFATE 4 MG/ML
INJECTION, SOLUTION INTRAMUSCULAR; INTRAVENOUS EVERY 2 HOUR PRN
Status: DISCONTINUED | OUTPATIENT
Start: 2019-12-04 | End: 2019-12-10

## 2019-12-04 RX ORDER — SALIVA STIMULANT COMB. NO.3
SPRAY, NON-AEROSOL (ML) MUCOUS MEMBRANE AS NEEDED
Status: DISCONTINUED | OUTPATIENT
Start: 2019-12-04 | End: 2019-12-10

## 2019-12-04 RX ORDER — MAGNESIUM HYDROXIDE/ALUMINUM HYDROXICE/SIMETHICONE 120; 1200; 1200 MG/30ML; MG/30ML; MG/30ML
30 SUSPENSION ORAL 4 TIMES DAILY PRN
Status: DISCONTINUED | OUTPATIENT
Start: 2019-12-04 | End: 2019-12-10

## 2019-12-04 NOTE — PROGRESS NOTES
Hem/Onc Inpatient  Note    Patient Name: Darien Singh   YOB: 1940   Medical Record Number: [id number]   CSN: [id number]   Attending Physician: Dr. Sharee Marques    Chief Complaint: abdominal pain and nausea; metastatic pancreatic cancer     S: P oz)   SpO2 96%   BMI 34.26 kg/m²   HEENT: PERRL. Chest: Clear to auscultation. Heart: Regular rate and rhythm. Abdomen: Rounded, non tender with palpation  Extremities: Pedal pulses are present. No edema. Neurological: Grossly intact.    Psych/Depress - 11.0 x10(3) uL    RBC 3.93 3.80 - 5.80 x10(6)uL    HGB 12.4 (L) 13.0 - 17.5 g/dL    HCT 37.1 (L) 39.0 - 53.0 %    PLT 55.0 (L) 150.0 - 450.0 10(3)uL    MCV 94.4 80.0 - 100.0 fL    MCH 31.6 26.0 - 34.0 pg    MCHC 33.4 31.0 - 37.0 g/dL    RDW 14.0 11.0 - 1 Total Protein, Other Body Fld 1.1 g/dL    Source, Other Body Fld Peritoneal Fluid    ALBUMIN, FLUID    Collection Time: 12/02/19  3:44 PM   Result Value Ref Range    Albumin, Other Body Fld 0.5 g/dL    Source, Other Body Fld Peritoneal Fluid    CELL COUNT/ Time: 12/03/19  3:39 AM   Result Value Ref Range    WBC 13.3 (H) 4.0 - 11.0 x10(3) uL    RBC 3.51 (L) 3.80 - 5.80 x10(6)uL    HGB 11.0 (L) 13.0 - 17.5 g/dL    HCT 33.4 (L) 39.0 - 53.0 %    PLT 81.0 (L) 150.0 - 450.0 10(3)uL    MCV 95.2 80.0 - 100.0 fL    M g/dL    Albumin 1.7 (L) 3.4 - 5.0 g/dL    Globulin  4.3 2.8 - 4.4 g/dL    A/G Ratio 0.4 (L) 1.0 - 2.0   MAGNESIUM    Collection Time: 12/04/19  5:45 AM   Result Value Ref Range    Magnesium 1.9 1.6 - 2.6 mg/dL   PHOSPHORUS    Collection Time: 12/04/19  5:4 Klebsiella - likely biliary source. Switched to Cefazolin and now back to zosyn. Paracentesis shows lots of WBC's in fluid consistent with peritonitis.   Clnically seems better.  ID following and hemodynamically stable.  LFT's are now normal.  Having indig

## 2019-12-04 NOTE — PLAN OF CARE
Patient alert and orientedx4. Forgetful at times. VSS. Tele NSR. Patient complains of pain, morphine 0.52mg given PRN. Patient on 2L oxygen. Incontinent. One large urine episode d/t lasix. BLE +3 pitting edema.  Patient placed on side to relieve pressure on

## 2019-12-04 NOTE — PHYSICAL THERAPY NOTE
Attempted to see Pt this AM - RN aware of attempt. Pt with high levels of pain, and requesting to rest.  Pt agreeable to session later today. Will f/u later today if time permits, after all other patients are attempted per tentative schedule.

## 2019-12-04 NOTE — PHYSICAL THERAPY NOTE
PHYSICAL THERAPY TREATMENT NOTE - INPATIENT    Room Number: 004/688-K     Session: 3/5   Number of Visits to Meet Established Goals: 5    Presenting Problem: Septic Shock     History related to current admission: Pt is [de-identified]year old male admitted on 11/24/2 • Osteoarthritis    • Pain in joints    • Personal history of antineoplastic chemotherapy 09/2018   • PONV (postoperative nausea and vomiting)    • Presence of other cardiac implants and grafts    • Prostatitis    • Renal disorder    • Skin cancer    • S Techniques: Activity promotion; Body mechanics;Repositioning    BALANCE                                                                                                                     Static Sitting: Fair -  Dynamic Sitting: Fair -           Static Hari Follow: YES  Sit<>Supine: NA  Stand<>Sit: Min A (cues for hand placement)    Comments related to Mobility: Pt required extra time for all functional mobility, and willing to work on ambulation in hallway.   Pt requesting toileting prior to gait training d/t training  Rehab Potential : Good  Frequency (Obs): 3-5x/week    CURRENT GOALS     Goal #1 Patient is able to demonstrate supine - sit EOB @ level: minimum assistance-met, updated to CGA      Goal #2 Patient is able to demonstrate transfers Sit to/from [place]

## 2019-12-04 NOTE — PROGRESS NOTES
[facility]                INFECTIOUS DISEASE PROGRESS NOTE    Baljit Malik Patient Status:  Inpatient    11/3/1940 MRN [id number]   [facility] 4SW-A Attending Elis Hurtado MD   Hosp Day # 10 PCP MD Hank Duff 1.9   TP 5.8* 5.6* 6.0*       No results found for: [facility]  Microbiology  CELL COUNT/DIFF PERITONEAL FL Once [[id number]] Collected: 12/02/19 1544   Specimen:  Body fluid, unspecified Updated: 12/02/19 1918    Neutrophils Peritoneal 89 %     Lymphocyte Perito Non-intractable vomiting with nausea, unspecified vomiting type     Malignant neoplasm of pancreas, unspecified location of malignancy (HCC)     Elevated LFTs     Hepatic encephalopathy (HCC)     NEMO (acute kidney injury) ([facility] [address]     ATN (acute tubular necro

## 2019-12-04 NOTE — PROGRESS NOTES
[facility]  Progress Note    Bindu Gupta Patient Status:  Inpatient    11/3/1940 MRN [id number]   [facility] 4NW-A Attending [address]  [place] Day # 10 PCP Gayle Bower MD       SUBJECTIVE:  No acute events overn UNIT/ML flextouch 35 Units, 35 Units, Subcutaneous, Q24H  traMADol HCl (ULTRAM) tab 50 mg, 50 mg, Oral, Q8H PRN    Or  traMADol HCl (ULTRAM) tab 100 mg, 100 mg, Oral, Q8H PRN  Piperacillin Sod-Tazobactam So (ZOSYN) 4.5 g in dextrose 5 % 100 mL MBP/ADD-vant MAGNESIA) 400 MG/5ML suspension 30 mL, 30 mL, Oral, Daily PRN  bisacodyl (DULCOLAX) rectal suppository 10 mg, 10 mg, Rectal, Daily PRN  FLEET ENEMA (FLEET) 7-19 GM/118ML enema 133 mL, 1 enema, Rectal, Once PRN  ondansetron HCl (ZOFRAN) injection 4 mg, 4 mg

## 2019-12-04 NOTE — PROGRESS NOTES
[facility]  Progress Note     Denise Ardon Patient Status:  Inpatient    11/3/1940 MRN [id number]   [facility] 4SW-A Attending Jett Peters, [address] [facility] Day # 10 PCP Yoel Jean Baptiste MD     Chief Complaint: Sepsis     S: Patie --  1.42*  --   --     < > = values in this interval not displayed.        Recent Labs   Lab 12/02/19  0640 12/03/19  0339 12/04/19  0545   * 137* 119*   BUN 54* 42* 33*   CREATSERUM 1.51* 1.38* 1.42*   GFRAA 50* 55* 54*   GFRNAA 43* 48* 46*   CA 7.9 activity  5. Transaminitis, resolved  6. NEMO, Due to sepsis, contrast, resolving  7. Hypertension, mildly elevated  1. On diuresis, off lisinopril due to recent nemo  8. Pancreatic ca with liver mets   1. onc following  9.  Hypothyroidism  1. levothyroxine

## 2019-12-04 NOTE — PROGRESS NOTES
[facility]  Nephrology Progress Note    Krystyna Ivy Attending:  Tiffany Richardson MD       Assessment and Plan:    1) NEMO- due to dehydration / contrast nephropathy (CT 11/24) in setting of sepsis- improving; no other acute insults.  Remains edematous 12/04/2019    .0 12/04/2019    CREATSERUM 1.42 12/04/2019    BUN 33 12/04/2019     12/04/2019    K 3.8 12/04/2019     12/04/2019    CO2 31.0 12/04/2019     12/04/2019    CA 7.5 12/04/2019    ALB 1.7 12/04/2019    ALKPHO 117 12/04/ (VENTOLIN) (2.5 MG/3ML) 0.083% nebulizer solution 2.5 mg, 2.5 mg, Nebulization, Q6H PRN  nystatin (MYCOSTATIN) suspension 500,000 Units, 5 mL, Oral, QID  Metoclopramide HCl (REGLAN) injection 5 mg, 5 mg, Intravenous, Q8H PRN  hydrALAzine HCl (APRESOLINE) i

## 2019-12-04 NOTE — PLAN OF CARE
Problem: PAIN - ADULT  Goal: Verbalizes/displays adequate comfort level or patient's stated pain goal  Description  INTERVENTIONS:  - Encourage pt to monitor pain and request assistance  - Assess pain using appropriate pain scale  - Administer analgesics balance, assess for edema, trend weights  Outcome: Progressing  Goal: Absence of cardiac arrhythmias or at baseline  Description  INTERVENTIONS:  - Continuous cardiac monitoring, monitor vital signs, obtain 12 lead EKG if indicated  - Evaluate effectivenes

## 2019-12-04 NOTE — PROGRESS NOTES
[facility]  Progress Note    Gypsy Rogers Patient Status:  Inpatient    11/3/1940 MRN [id number]   [facility] 4NW-A Attending Misty Yao [facility] Day # 10 PCP Francesca Mae MD     Subjective:  Gypsy Harperson is a( 12/04/19  0545   RBC 3.65* 3.93 3.51* 3.81   HGB 11.4* 12.4* 11.0* 12.1*   HCT 34.0* 37.1* 33.4* 36.2*   MCV 93.2 94.4 95.2 95.0   MCH 31.2 31.6 31.3 31.8   MCHC 33.5 33.4 32.9 33.4   RDW 13.7 14.0 14.3 14.5   NEPRELIM 8.72* 11.51* 11.26*  --    WBC 11.0 1 SR.  12/4/2019  11:31 AM

## 2019-12-04 NOTE — PROGRESS NOTES
Gastroenterology Progress Note  Andreia Wang Patient Status:  Inpatient    11/3/1940 MRN [id number]   [facility] 4NW-A Attending Pan Jeronimo, [address] [facility] [address] Day # 10 PCP Ghazal Monique MD multislice CT scanning was performed from the dome of the diaphragm to the pubic symphysis. Dose reduction techniques were used.  Dose information is transmitted to the [facility] ([facility]) Leonel Moralez [address] ([address]) which inc hernia. Slater August PELVIC ORGANS:  Increasing ascites   BONES:  Stable      =====  CONCLUSION:  Interval increase in abdominal and pelvic ascites since 11/27/2019.      Interval increase in areas of decreased attenuation which are ill-defined involving the dome and and his partially occluded stent was replaced. FINDINGS:    LIVER:  There are peripheral air collections seen in the subcapsular portion of the left lobe of. These are very peripheral and there is a large amount of air within this area.   Portal ve dependent atelectasis within both lower lobes bilaterally. OTHER:  Negative.    =====  CONCLUSION:  Bilateral dependent atelectasis. Air within the biliary tree as noted above.   There is a larger collection of air  in the subcapsular portion of the

## 2019-12-05 ENCOUNTER — APPOINTMENT (OUTPATIENT)
Dept: NUCLEAR MEDICINE | Facility: HOSPITAL | Age: 79
DRG: 919 | End: 2019-12-05
Attending: NURSE PRACTITIONER
Payer: MEDICARE

## 2019-12-05 ENCOUNTER — APPOINTMENT (OUTPATIENT)
Dept: CT IMAGING | Facility: HOSPITAL | Age: 79
DRG: 919 | End: 2019-12-05
Attending: INTERNAL MEDICINE
Payer: MEDICARE

## 2019-12-05 ENCOUNTER — TELEPHONE (OUTPATIENT)
Dept: HEMATOLOGY/ONCOLOGY | Facility: HOSPITAL | Age: 79
End: 2019-12-05

## 2019-12-05 PROCEDURE — 99233 SBSQ HOSP IP/OBS HIGH 50: CPT | Performed by: INTERNAL MEDICINE

## 2019-12-05 PROCEDURE — 99232 SBSQ HOSP IP/OBS MODERATE 35: CPT | Performed by: INTERNAL MEDICINE

## 2019-12-05 PROCEDURE — 78299 UNLISTED GI PX DX NUC MED: CPT | Performed by: NURSE PRACTITIONER

## 2019-12-05 PROCEDURE — 74176 CT ABD & PELVIS W/O CONTRAST: CPT | Performed by: INTERNAL MEDICINE

## 2019-12-05 PROCEDURE — 99233 SBSQ HOSP IP/OBS HIGH 50: CPT | Performed by: HOSPITALIST

## 2019-12-05 PROCEDURE — 78226 HEPATOBILIARY SYSTEM IMAGING: CPT | Performed by: NURSE PRACTITIONER

## 2019-12-05 RX ORDER — ENOXAPARIN SODIUM 100 MG/ML
40 INJECTION SUBCUTANEOUS DAILY
Status: DISCONTINUED | OUTPATIENT
Start: 2019-12-05 | End: 2019-12-05

## 2019-12-05 RX ORDER — POTASSIUM CHLORIDE 29.8 MG/ML
40 INJECTION INTRAVENOUS ONCE
Status: COMPLETED | OUTPATIENT
Start: 2019-12-05 | End: 2019-12-05

## 2019-12-05 RX ORDER — POLYETHYLENE GLYCOL 3350 17 G/17G
17 POWDER, FOR SOLUTION ORAL 2 TIMES DAILY PRN
Status: DISCONTINUED | OUTPATIENT
Start: 2019-12-05 | End: 2019-12-06

## 2019-12-05 NOTE — PROGRESS NOTES
Heme/Onc Progress Note    Patient Name: Kaitlynn Monk   YOB: 1940   Medical Record Number: [id number]   CSN: [id number]   Attending Physician: Briseida Mccartney M.D. Subjective:  Feels a little better. Hydrocodone helped.   Still hasn' (ZENPEP) DR particles cap 20,000 Units, 20,000 Units, Oral, TID CC  •  lidocaine (UROJET) 2 % urethral jelly 20 mL, 20 mL, Urethral, PRN  •  dextrose 5 % and 0.9 % NaCl infusion, , Intravenous, Continuous PRN  •  glucose (DEX4) oral liquid 15 g, 15 g, Oral is clear. Neck: No JVD. No palpable lymphadenopathy. Neck is supple. Chest: Clear to auscultation. Heart: Regular rate and rhythm. Abdomen: Soft, non tender with good bowel sounds. Extremities: Pedal pulses are present. No edema.   Neurological: Corean Franco - 34.0 pg    MCHC 33.5 31.0 - 37.0 g/dL    RDW 14.3 11.0 - 15.0 %    RDW-SD 48.0 (H) 35.1 - 46.3 fL    Neutrophil Absolute Prelim 15.75 (H) 1.50 - 7.70 x10 (3) uL    Neutrophil Absolute 15.75 (H) 1.50 - 7.70 x10(3) uL    Lymphocyte Absolute 0.80 (L) 1.00 - upon discharge.     Key Cooley MD  [facility]  [address],  [facility]

## 2019-12-05 NOTE — PLAN OF CARE
Assumed care @ 0730. Patient slightly drowsy, easily arousable. Respirations non-labored, lungs sound diminished, O2 sat 92-94% on O2 1l/nc. Patient's abdomen distended, soft,  tender on upper quadrants, bowel sounds hypoactive. Patient with poor appetite.

## 2019-12-05 NOTE — PROGRESS NOTES
[facility]  Progress Note    Avery Gutiérrez Patient Status:  Inpatient    11/3/1940 MRN [id number]   [facility] 4NW-A Attending Job Dial, MD   [facility] Day # 6 PCP Hiwot Brown MD       SUBJECTIVE:  No acute events overnight. tablet, Oral, Q6H PRN  Alum & Mag Hydroxide-Simeth (MAALOX) oral suspension 30 mL, 30 mL, Oral, QID PRN  insulin detemir (LEVEMIR) 100 UNIT/ML flextouch 30 Units, 30 Units, Subcutaneous, Q24H  furosemide (LASIX) injection 40 mg, 40 mg, Intravenous, BID Marlo Lombardo MAGNESIA) 400 MG/5ML suspension 30 mL, 30 mL, Oral, Daily PRN  bisacodyl (DULCOLAX) rectal suppository 10 mg, 10 mg, Rectal, Daily PRN  FLEET ENEMA (FLEET) 7-19 GM/118ML enema 133 mL, 1 enema, Rectal, Once PRN  ondansetron HCl (ZOFRAN) injection 4 mg, 4 mg

## 2019-12-05 NOTE — CM/SW NOTE
Interdisciplinary Rounds: 12/05/19  Admitted: 11/24/2019 LOS: 11  Disciplines in attendance: charge nurse, staff nurse, CM, [address] and discharge plan reviewed. Plan for Tim Moon at discharge.     Active issues needing resolution prior to dischar

## 2019-12-05 NOTE — PLAN OF CARE
Reviewed CT w/ MD and he reviewed w/ GI. We examined patient together at bedside after CT.  VSS. Stopped lasix/lovenox. Keep npo. Pending HIDA scan and DR. Dobbins consulted. Updated pulm, nephrology, endocrinology. Low threshold for ICU.   Spoke with pt

## 2019-12-05 NOTE — CM/SW NOTE
[facility] [place] [address] who states they have a bed available today and tomorrow if pt is medically cleared. Pt will need to be off IV Lasix to discharge to .     Brice Solis, MSN RN, CTL/  P: [phone]

## 2019-12-05 NOTE — PLAN OF CARE
Problem: SAFETY ADULT - FALL  Goal: Free from fall injury  Description  INTERVENTIONS:  - Assess pt frequently for physical needs  - Identify cognitive and physical deficits and behaviors that affect risk of falls.   - [place] fall precautions as indica signs/symptoms of CO2 retention  Outcome: Not Progressing     Problem: GASTROINTESTINAL - ADULT  Goal: Minimal or absence of nausea and vomiting  Description  INTERVENTIONS:  - Maintain adequate hydration with IV or PO as ordered and tolerated  - Nasogastr

## 2019-12-05 NOTE — CONSULTS
[facility] Surgical Oncology Consult    Patient Name:  Liseth Christie   YOB: 1940   Gender:  Male   Appt Date:  12/5/2019   Provider:  Tahmina Sharpe MD   Insurance:  MEDICARE PART A&B       CHIEF COMPLAINT  Abdominal pain     PROBL waning pain. Today he complains of worsening pain. There was associated nausea. No vomiting. No fevers or chills. No diarrhea or constipation. There is concern about the gallbladder and thus this consultation.      Vital Signs:  /52 (BP Location OR), Take 10 mg by mouth daily.   , Disp: , Rfl:            Allergies Reviewed:    Clindamycin             DIARRHEA     History:  Reviewed:  Past Medical History:   Diagnosis Date   • Anxiety    • Arthritis    • Back pain    • Bad breath    • Bloating    • significant on the right side. Associated nausea but no vomiting. No fevers or chills. No diarrhea or constipation. No back pain hip pain or joint pain. Normal color urine. Physical Examination:  Constitutional: NAD. Alert and oriented.   Eyes: tube drainage. Care also discussed with Dr. Vish Jaquez and Dr. Semaj Harry. Electronically Signed by:    Sarah Jane.  David Bryson MD FACS    Chief of Will Tan  Professor of Surgery, [facility]  [phone]

## 2019-12-05 NOTE — PROGRESS NOTES
[facility]  Progress Note     Ajay Lawson Patient Status:  Inpatient    11/3/1940 MRN [id number]   [facility] 4SW-A Attending Stuart White MD   [facility] Day # 11 PCP Jonel Gomez MD     Chief Complaint: Sepsis     S: Patient r 142 138 136   K 3.7 3.8 3.8    103 101   CO2 29.0 31.0 31.0   ALKPHO 117 117 103   AST 25 26 34   ALT 49 42 34   BILT 1.9 1.9 2.7*   TP 5.6* 6.0* 5.7*       Estimated Creatinine Clearance: 43.5 mL/min (A) (based on SCr of 1.49 mg/dL (H)).     Recent L d/c'd  10. Hypothyroidism  1. levothyroxine  11. Thrombocytopenia improving 136  12. Leukocytosis increased  1. Repeat paracentesis? 13. Deconditioning  1.  PMR eval, PT/OT     Quality:  · DVT Prophylaxis: SCD   · CODE status: FULL     Will the patient be team and RN  Mikel Arredondo MD

## 2019-12-05 NOTE — PLAN OF CARE
Problem: PAIN - ADULT  Goal: Verbalizes/displays adequate comfort level or patient's stated pain goal  Description  INTERVENTIONS:  - Encourage pt to monitor pain and request assistance  - Assess pain using appropriate pain scale  - Administer analgesics assess for edema, trend weights  Outcome: Progressing  Goal: Absence of cardiac arrhythmias or at baseline  Description  INTERVENTIONS:  - Continuous cardiac monitoring, monitor vital signs, obtain 12 lead EKG if indicated  - Evaluate effectiveness of anti mobilization and activity  - Obtain nutritional consult as needed  - Establish a toileting routine/schedule  - Consider collaborating with pharmacy to review patient's medication profile  Outcome: Progressing     Problem: METABOLIC/FLUID AND ELECTROLYTES -

## 2019-12-05 NOTE — PROGRESS NOTES
[facility]                INFECTIOUS DISEASE PROGRESS NOTE    [facility]:  [facility]    11/3/1940 MRN [id number]   [facility] 4SW-A Attending Jess Lai MD   [facility] Day # 11 PCP MD Yesenia Morales [place] Once [[id number]] Collected: 12/02/19 1544   Specimen:  Body fluid, unspecified Updated: 12/02/19 1918    Neutrophils Peritoneal 89 %     Lymphocyte Peritoneal 1 %     Mono/Mac/Histio Peritoneal 8 %     Eosinophils Peritoneal 0 %     Basophil Peritoneal 0 unspecified location of malignancy ([facility] [address]     Elevated LFTs     Hepatic encephalopathy (HCC)     NEMO (acute kidney injury) ([facility] [address]     ATN (acute tubular necrosis) (HCC)     Cholangitis     Peritonitis (HCC)      ASSESSMENT/PLAN:  1.  Klebsiella bacteremia/cho

## 2019-12-05 NOTE — PROGRESS NOTES
[facility]  Nephrology Progress Note    Huong Coy Attending:  Franchesca Benson MD       Assessment and Plan:    1) NEMO- due to dehydration / contrast nephropathy (CT 11/24) in setting of sepsis- improving; no other acute insults.  Remains edematous Labs:   Lab Results   Component Value Date    WBC 18.0 12/05/2019    HGB 11.5 12/05/2019    HCT 34.3 12/05/2019    .0 12/05/2019    CREATSERUM 1.49 12/05/2019    BUN 32 12/05/2019     12/05/2019    K 3.8 12/05/2019     12/05/2019 5 % and 0.9 % NaCl infusion, , Intravenous, Continuous PRN  glucose (DEX4) oral liquid 15 g, 15 g, Oral, Q15 Min PRN    Or  Glucose-Vitamin C (DEX-4) chewable tab 4 tablet, 4 tablet, Oral, Q15 Min PRN    Or  dextrose 50 % injection 50 mL, 50 mL, Intravenou

## 2019-12-05 NOTE — TELEPHONE ENCOUNTER
Toi Johns called asking Brian Montes De Oca to please call her per Cally Ibanez. She says she needs to relay some information on her father today. She says she is very concerned and needs to speak with him. She says it's urgent, and did not wish to give more information.

## 2019-12-05 NOTE — SLP NOTE
Order received for bedside swallow evaluation d/t pt's c/o difficulty swallowing pills. Pt recently seen for bedside swallow evaluation 11/29/19 with recommendation for pills to be given one at a time whole in pureed.  Spoke with RN who reported multiple pi

## 2019-12-05 NOTE — PROGRESS NOTES
[facility]  Progress Note    Denise Ardon Patient Status:  Inpatient    11/3/1940 MRN [id number]   [facility] 4NW-A Attending Leesa Castle MD   [address] Day # 6 PCP Yoel Jean Baptiste MD     Subjective:  Denise Ardon is a(n) [de-identified] y 18.1*   INR 1.42*       Cultures: Ascitic fluid thus far negative blood cultures remain negative    Radiology:      Yesterday's film reviewed with poor inspiratory effort no true focal infiltrate or effusion noted      Medications reviewed     Assessment a alternative.     Plan:  Continue bronchopulmonary toilet and efforts at increased inspiratory effort  Plan to wean FiO2 as able  Recheck chest x-ray  Repeat CT pending  May need to discuss alternate means of nutrition  Continuing to follow    [address]

## 2019-12-06 ENCOUNTER — APPOINTMENT (OUTPATIENT)
Dept: GENERAL RADIOLOGY | Facility: HOSPITAL | Age: 79
DRG: 919 | End: 2019-12-06
Attending: INTERNAL MEDICINE
Payer: MEDICARE

## 2019-12-06 ENCOUNTER — APPOINTMENT (OUTPATIENT)
Dept: ULTRASOUND IMAGING | Facility: HOSPITAL | Age: 79
DRG: 919 | End: 2019-12-06
Attending: INTERNAL MEDICINE
Payer: MEDICARE

## 2019-12-06 PROCEDURE — 99232 SBSQ HOSP IP/OBS MODERATE 35: CPT | Performed by: INTERNAL MEDICINE

## 2019-12-06 PROCEDURE — 49083 ABD PARACENTESIS W/IMAGING: CPT | Performed by: INTERNAL MEDICINE

## 2019-12-06 PROCEDURE — 99231 SBSQ HOSP IP/OBS SF/LOW 25: CPT | Performed by: HOSPITALIST

## 2019-12-06 PROCEDURE — 0W9G3ZZ DRAINAGE OF PERITONEAL CAVITY, PERCUTANEOUS APPROACH: ICD-10-PCS | Performed by: RADIOLOGY

## 2019-12-06 PROCEDURE — 99233 SBSQ HOSP IP/OBS HIGH 50: CPT | Performed by: INTERNAL MEDICINE

## 2019-12-06 PROCEDURE — 71045 X-RAY EXAM CHEST 1 VIEW: CPT | Performed by: INTERNAL MEDICINE

## 2019-12-06 RX ORDER — POLYETHYLENE GLYCOL 3350 17 G/17G
17 POWDER, FOR SOLUTION ORAL DAILY
Status: DISCONTINUED | OUTPATIENT
Start: 2019-12-06 | End: 2019-12-10

## 2019-12-06 NOTE — PLAN OF CARE
Patient back from paracentesis, vitals stable, patient sleepy, easily aroused, dressing intact to left lower quadrant. Patient on telemetry sinus rhythm rate 70'S

## 2019-12-06 NOTE — PLAN OF CARE
NPO for paracentesis tomorrow afternoon. HIDA scan done, MD aware of results. Norco given per mar for ab pain with good relief. VSS, afebrile. ON 2L O2 overnight, sating 95%. Lung sounds diminished. +1 pitting edema noted to BLE. On tele NSR.   Now s range  Description  INTERVENTIONS:  - Monitor Blood Glucose as ordered  - Assess for signs and symptoms of hyperglycemia and hypoglycemia  - Administer ordered medications to maintain glucose within target range  - Assess barriers to adequate nutritional i

## 2019-12-06 NOTE — PROGRESS NOTES
Heme/Onc Progress Note    Patient Name: Darien Singh   YOB: 1940   Medical Record Number: [id number]   CSN: [id number]   Attending Physician: Mahesh Aceves M.D. Subjective:  Feels a little better. Abd pain seems a bit better.   Pat Dash Aspart Pen (NOVOLOG) 100 UNIT/ML flexpen 0-60 Units, 0-60 Units, Subcutaneous, TID CC and HS  •  melatonin tab TABS 3 mg, 3 mg, Oral, Nightly  •  pancrelipase (Lip-Prot-Amyl) (ZENPEP) DR particles cap 20,000 Units, 20,000 Units, Oral, TID CC  •  lidocaine HEENT: EOMs intact. PERRL. Oropharynx is clear. Neck: No JVD. No palpable lymphadenopathy. Neck is supple. Chest: Clear to auscultation. Heart: Regular rate and rhythm. Abdomen: Soft, moderately distended and minimally tender with bowel sounds.   Ex mg/dL   PHOSPHORUS    Collection Time: 12/06/19  5:25 AM   Result Value Ref Range    Phosphorus 3.3 2.5 - 4.9 mg/dL   CBC W/ DIFFERENTIAL    Collection Time: 12/06/19  5:25 AM   Result Value Ref Range    WBC 16.4 (H) 4.0 - 11.0 x10(3) uL    RBC 3.30 (L) 3. within the left supra hepatic space has   significantly increased in size and is now noted to partially obscure the prior noted low-density lesion of the lateral segment left lobe that was described on previous imaging.   Currently best seen on image 30 of previous exam suggesting increased proteinaceous debris within the fluid.   Prior noted low-density lesion within the lateral segment of the left lobe is partially obscured by the new adjacent supra   hepatic ascites and the possibility of metastatic diseas  Paracentesis shows lots of WBC's in fluid consistent with peritonitis.  Clnically seems better.  ID following and hemodynamically stable.  Bilirubin is a bit better. Some concern about bile leak - this has not been borne out.   No opacification of GB, with

## 2019-12-06 NOTE — PROGRESS NOTES
[facility]  Nephrology Progress Note    Karely Waite Attending:  Marlyn Bliss MD       Assessment and Plan:    1) NEMO- due to dehydration / contrast nephropathy (CT 11/24) in setting of sepsis- stable; off diuretics for now     2) Klebsiella bact  12/06/2019    K 3.9 12/06/2019    CL 99 12/06/2019    CO2 30.0 12/06/2019     12/06/2019    CA 7.5 12/06/2019    ALB 1.5 12/06/2019    ALKPHO 101 12/06/2019    BILT 2.1 12/06/2019    TP 5.4 12/06/2019    AST 67 12/06/2019    ALT 43 12/06/2019 30 g, 30 g, Oral, Q15 Min PRN    Or  Glucose-Vitamin C (DEX-4) chewable tab 8 tablet, 8 tablet, Oral, Q15 Min PRN  albuterol sulfate (VENTOLIN) (2.5 MG/3ML) 0.083% nebulizer solution 2.5 mg, 2.5 mg, Nebulization, Q6H PRN  Metoclopramide HCl (REGLAN) inject

## 2019-12-06 NOTE — PROGRESS NOTES
[facility]  Progress Note    Irvin Mohs Patient Status:  Inpatient    11/3/1940 MRN [id number]   [facility] 4NW-A Attending Tyson [facility] Day # 12 PCP Nadeen Mercado MD     Subjective:  Irvin Mohs is a( Data Review:  Recent Labs   Lab 12/03/19  0339 12/04/19  0545 12/05/19  0650 12/06/19  0525   RBC 3.51* 3.81 3.62* 3.30*   HGB 11.0* 12.1* 11.5* 10.6*   HCT 33.4* 36.2* 34.3* 31.4*   MCV 95.2 95.0 94.8 95.2   MCH 31.3 31.8 31.8 32.1   MCHC 32.9 33.4 33.5 3 sats >89%  · Mobilize/OOBTC as tolerated    MD DARYL Cuadra  12/06/19

## 2019-12-06 NOTE — PROGRESS NOTES
[facility]  Progress Note     Susan Wilhelm Patient Status:  Inpatient    11/3/1940 MRN [id number]   [facility] 4SW-A Attending José Silva MD   [facility] Day # 12 PCP Swetha Haider MD     Chief Complaint: Sepsis     S: Patient r 1.42* 1.49* 1.51*   GFRAA 54* 51* 50*   GFRNAA 46* 44* 43*   CA 7.5* 7.6* 7.5*   ALB 1.7* 1.5* 1.5*    136 134*   K 3.8 3.8 3.9    101 99   CO2 31.0 31.0 30.0   ALKPHO 117 103 101   AST 26 34 67*   ALT 42 34 43   BILT 1.9 2.7* 2.1*   TP 6.0* 5. resolved  12. Leukocytosis better today  13. Deconditioning  1.  PMR eval, PT/OT     Quality:  · DVT Prophylaxis: SCD   · CODE status: FULL     Will the patient be referred to TCC on discharge?: no     Estimated date of discharge: tbd, acute rehab once stab

## 2019-12-06 NOTE — PLAN OF CARE
Patient can have clear liquids till patient leaves for paracentesis around 1500, O2 at 5 liters per nasal cannula, with cpox at 96%,   Daughter upset that patient isnt going to procedure now at 1100, for that was the time she was told he would be going for

## 2019-12-06 NOTE — PHYSICAL THERAPY NOTE
Attempted to see Pt this AM - RN aware of attempt. Pt occupied with MD, and then multiple tests including paracentesis. Pt remains up with Min A, and able to ambulate with nursing staff, use of RW and chair follow.   Will f/u later today if time permits,

## 2019-12-06 NOTE — OCCUPATIONAL THERAPY NOTE
Attempted to see patient for OT session, however patient currently asleep, awaiting paracentesis procedure later today. Patient's daughter, RN and MD present and agreeable on holding OT today. Will follow-up as schedule permits.

## 2019-12-06 NOTE — PROGRESS NOTES
[facility]                INFECTIOUS DISEASE PROGRESS NOTE    Radha Donahue Patient Status:  Inpatient    11/3/1940 MRN [id number]   [facility] 4SW-A Attending Jarvis Ocampo MD   [facility] Day # 12 PCP MD Uziel Waterman [[id number]] Collected: 12/02/19 1544   Specimen:  Body fluid, unspecified Updated: 12/02/19 1918    Neutrophils Peritoneal 89 %     Lymphocyte Peritoneal 1 %     Mono/Mac/Histio Peritoneal 8 %     Eosinophils Peritoneal 0 %     Basophil Peritoneal 0 %     M unspecified location of malignancy ([facility] [address]     Elevated LFTs     Hepatic encephalopathy (HCC)     NEMO (acute kidney injury) ([facility] [address]     ATN (acute tubular necrosis) (HCC)     Cholangitis     Peritonitis (HCC)      ASSESSMENT/PLAN:  1.  Klebsiella bacteremia/cho

## 2019-12-06 NOTE — PROCEDURES
[facility]  Procedure Note    Crimora Class Patient Status:  Inpatient    11/3/1940 MRN [id number]   [facility] 4NW-A Attending Quentin Malagon MD   Hosp Day # 12 PCP Kathya Mejia MD     LOCATION: Left lower quadrant  FLUID REMOV

## 2019-12-06 NOTE — PROGRESS NOTES
Gastroenterology Progress Note  Baljit Malik Patient Status:  Inpatient    11/3/1940 MRN [id number]   [facility] 4NW-A Attending Mayi Jaquez MD   [facility] Day # 12 PCP Pantera Quintanilla MD to the pubic symphysis. Dose reduction techniques were used. Dose information is transmitted to the  [facility]) NRDR ([address] [place] [address]) which includes the Dose Index Registry.      PATIENT STATED HISTORY: (As trans =====  CONCLUSION:  Interval increase in abdominal and pelvic ascites since 11/27/2019.      Interval increase in areas of decreased attenuation which are ill-defined involving the dome and the medial and lateral segments of the left lobe of the liver ext LIVER:  There are peripheral air collections seen in the subcapsular portion of the left lobe of. These are very peripheral and there is a large amount of air within this area. Portal venous gas cannot be excluded.   This could still be biliary but it Negative.    =====  CONCLUSION:  Bilateral dependent atelectasis. Air within the biliary tree as noted above.   There is a larger collection of air  in the subcapsular portion of the liver which is more than that generally seen with intra biliary air (ischemic in nature), and new onset ascites, found to have evidence of SBP. CT with concern for bile leak.  HIDA done with no evidence of bile leak  - will obtain repeat para today and check for ascites bili  - if signs of bilirubin in ascites, will consul

## 2019-12-06 NOTE — PROGRESS NOTES
[facility]  Progress Note    Gem Raymundo Patient Status:  Inpatient    11/3/1940 MRN [id number]   [facility] 4NW-A Attending Lucas Arce MD   Date 2019 PCP Rona Beasley MD     Subjective:  Gem Raymundo is a(n) 8 lateral segment of the left lobe. Also of   note, the density of the ascites has increased from the previous exam suggesting increased proteinaceous debris within the fluid.   Prior noted low-density lesion within the lateral segment of the left lobe is pa LFT's  Advanced pancreas cancer metastatic implants/infiltreation noted at duodenum and antrum during recent endoscopy    Plan:  HIDA scan   Paracentesis repeat cell count culture, bilirubin level  Consider IR cholecystostomy  Case discussed with Surgical

## 2019-12-07 PROCEDURE — 99233 SBSQ HOSP IP/OBS HIGH 50: CPT | Performed by: HOSPITALIST

## 2019-12-07 PROCEDURE — 99232 SBSQ HOSP IP/OBS MODERATE 35: CPT | Performed by: INTERNAL MEDICINE

## 2019-12-07 RX ORDER — ALBUMIN (HUMAN) 12.5 G/50ML
25 SOLUTION INTRAVENOUS EVERY 12 HOURS
Status: DISCONTINUED | OUTPATIENT
Start: 2019-12-07 | End: 2019-12-08

## 2019-12-07 RX ORDER — HEPARIN SODIUM 5000 [USP'U]/ML
5000 INJECTION, SOLUTION INTRAVENOUS; SUBCUTANEOUS EVERY 8 HOURS SCHEDULED
Status: DISCONTINUED | OUTPATIENT
Start: 2019-12-07 | End: 2019-12-10

## 2019-12-07 RX ORDER — BUMETANIDE 0.25 MG/ML
2 INJECTION, SOLUTION INTRAMUSCULAR; INTRAVENOUS
Status: COMPLETED | OUTPATIENT
Start: 2019-12-07 | End: 2019-12-09

## 2019-12-07 RX ORDER — POTASSIUM CHLORIDE 20 MEQ/1
40 TABLET, EXTENDED RELEASE ORAL ONCE
Status: COMPLETED | OUTPATIENT
Start: 2019-12-07 | End: 2019-12-07

## 2019-12-07 RX ORDER — DIAPER,BRIEF,INFANT-TODD,DISP
EACH MISCELLANEOUS 2 TIMES DAILY
Status: DISCONTINUED | OUTPATIENT
Start: 2019-12-07 | End: 2019-12-10

## 2019-12-07 NOTE — PROGRESS NOTES
Up in chair until 0400. Slightly forgetful, anxious. Wanting to call his daughter at 18, but declined when he realized it was morning. Paracentesis dressing with serous drainage on gauze, no leakage under tegaderm. Voiding phu urine.   Medicated wit

## 2019-12-07 NOTE — PROGRESS NOTES
Patient feels about the same. Blood pressure 137/62, pulse 72, temperature 97.8 °F (36.6 °C), temperature source Oral, resp. rate 14, height 1.829 m (6'), weight 113.6 kg (250 lb 6.4 oz), SpO2 92 %.       Intake/Output Summary (Last 24 hours) at 12/7/2019

## 2019-12-07 NOTE — PROGRESS NOTES
Heme/Onc Progress Note - [facility]      Chief Complaint:    Follow up for evaluation and management of pancreatic cancer. Interim History:      He is feeling better. He has less abdominal pain. His mood is better. He has no new pain.  He has some dyspnea with paracentesis was performed without complication. Impression:     1)  Abd pain with nausea: Had partial stent obstruction - this has been revised.  Had positive blood culture on admit - Klebsiella - likely biliary source. Now on ceftriaxone.  Repeat Parac

## 2019-12-07 NOTE — PROGRESS NOTES
[facility]                INFECTIOUS DISEASE PROGRESS NOTE    Bindu Hukeisha Patient Status:  Inpatient    11/3/1940 MRN [id number]   [facility] 4SW-A Attending Chhaya Gómez MD   [facility] Day # 15 PCP MD María Juárez Final result   Component  Ref Range & Units 12/6/19 1541   Source, Other Body Fluid ASCITES FLUID    WBC Oth Body Fl  /mm3 1,059    RBC Oth Body Fl  /mm3 <3,000    Resulting Agency [facility]         Specimen Collected: 12/06/19 15:41 Last Resulted: 12/06/ Non-intractable vomiting with nausea, unspecified vomiting type     Malignant neoplasm of pancreas, unspecified location of malignancy (HCC)     Elevated LFTs     Hepatic encephalopathy (HCC)     NEMO (acute kidney injury) ([facility] [address]     ATN (acute tubular necr

## 2019-12-07 NOTE — PLAN OF CARE
Clarified evening dose of levemier with endocrine md, received clear liquid tray, and tolerating well.

## 2019-12-07 NOTE — PROGRESS NOTES
[facility]  Nephrology Progress Note    Dl Enrique Patient Status:  Inpatient    11/3/1940 MRN [id number]   [facility] 4NW-A Attending Taylor Rowe MD   [facility] Day # 15 PCP Kelly Gupta MD       SUBJECTIVE:  Still with mild u --  11.0* 12.1* 11.5* 10.6*  --  10.5*   MCV 93.2   < >  --  95.2 95.0 94.8 95.2  --  95.6   PLT 32.0*   < >  --  81.0* 127.0* 136.0* 161.0  --  203.0   BAND 9  --   --   --   --   --   --   --   --    INR  --   --  1.42*  --   --   --   --  1.32* 1.26* tab 50 mg, 50 mg, Oral, Q8H PRN    Or  traMADol HCl (ULTRAM) tab 100 mg, 100 mg, Oral, Q8H PRN  Pantoprazole Sodium (PROTONIX) EC tab 40 mg, 40 mg, Oral, QAM AC  Insulin Aspart Pen (NOVOLOG) 100 UNIT/ML flexpen 0-60 Units, 0-60 Units, Subcutaneous, TID CC to ATN related to contrast nephropathy in setting of vol depletion. Has resolved and renal fxn again nl    #2. Anasarca- has ongoing hypoalbuminemia in setting of vol resuscitation. IV albumin/bumex     #3.   Klebsiella sepsis- s/p ERCP with stone extract

## 2019-12-07 NOTE — CM/SW NOTE
Order received for acute rehab. RN says no dc anticipated until Monday. SW to follow.     Laureano Matta, [facility]  /Discharge Planner  [phone]

## 2019-12-07 NOTE — PROGRESS NOTES
[facility]  Progress Note    Ford Easonmita Patient Status:  Inpatient    11/3/1940 MRN [id number]   [facility] 4NW-A Attending Antonina Arias [facility] Day # 15 PCP Oscar Rubio MD     Subjective:  Ford Salinas is a( guarding, no rebound, positive BS   Extremity: 1-2+ lower extremity/dependent edema, no cyanosis   Neurological: Alert, interactive, no focal deficits    Lab Data Review:  Recent Labs   Lab 12/05/19  0650 12/06/19  0525 12/07/19  0418   RBC 3.62* 3.30* 3.3 ceftriaxone  · continue to wean o2 as tolerated for sats >89%; now room air  · Mobilize/OOBTC as tolerated  · No objection to transfer to rehab/[facility] from pulmonary standpoint. Will follow peripherally.   Please call with questions/concerns    Pat Olvera

## 2019-12-07 NOTE — PROGRESS NOTES
[facility]  Endocrinology Progress Note    Desiree Wang Patient Status:  Inpatient    11/3/1940 MRN [id number]   [facility] 4NW-A Attending Kamilah Key MD   [facility] Day # 15 PCP Cici Gan MD     CC: Patient presents with:  A mg Oral Daily with breakfast       Labs  Reviewed in EPIC    Component      Latest Ref Rng & Units 11/25/2019   HEMOGLOBIN A1c      <5.7 % 9.2 (H)     Lab Results   Component Value Date    WBC 15.7 12/07/2019    HGB 10.5 12/07/2019    HCT 32.3 12/07/2019

## 2019-12-07 NOTE — PROGRESS NOTES
[facility]  Progress Note     Gem Rise Patient Status:  Inpatient    11/3/1940 MRN [id number]   [facility] 4SW-A Attending Jaya Nagel MD   [facility] Day # 15 PCP Rona Beasley MD     Chief Complaint: Sepsis     S: Patient d ALB 1.5* 1.5* 1.5*    134* 137   K 3.8 3.9 3.7    99 100   CO2 31.0 30.0 31.0   ALKPHO 103 101 108   AST 34 67* 137*   ALT 34 43 65*   BILT 2.7* 2.1* 1.8   TP 5.7* 5.4* 5.2*       Estimated Creatinine Clearance: 53.4 mL/min (based on SCr of 1 8. Hypothyroidism  1. Synthroid  9. BPH  1. Alfuzosin  10. Leukocytosis, improving  11. Jerie Hemming, treated  12. Acute kidney injury, resolved  13.  Essential hypertension    Quality:  · DVT Prophylaxis: SCD, Heparin if okay with GI & Onc   · CODE status: FUL

## 2019-12-07 NOTE — PROGRESS NOTES
Gastroenterology Progress Note  Patient Name: Gem Raymundo  Chief Complaint: ascites, SBP  S: Pt reports that abdominal pain has improved. He had a recent BM. He tolerated CLD.    O: /55   Pulse 69   Temp 98.2 °F (36.8 °C) (Oral)   Resp 12   Ht 7 WBC decreased from 14,000 to 1,000  - pt on day 6 of Abx and Zosyn has been switched to Ceftriaxone; would consider slightly longer course than usual 7 day  - conservative management for presumed ischemic colitis  -advance diet to FLD            Total time

## 2019-12-07 NOTE — PROGRESS NOTES
Patient feels better  Blood pressure 142/58, pulse 69, temperature 98.8 °F (37.1 °C), temperature source Axillary, resp. rate 18, height 1.829 m (6'), weight 108.4 kg (239 lb), SpO2 97 %.       Intake/Output Summary (Last 24 hours) at 12/6/2019 9874  Last d

## 2019-12-08 PROCEDURE — 99232 SBSQ HOSP IP/OBS MODERATE 35: CPT | Performed by: INTERNAL MEDICINE

## 2019-12-08 PROCEDURE — 99233 SBSQ HOSP IP/OBS HIGH 50: CPT | Performed by: HOSPITALIST

## 2019-12-08 RX ORDER — INSULIN GLARGINE 100 [IU]/ML
42 INJECTION, SOLUTION SUBCUTANEOUS NIGHTLY
Qty: 15 ML | Refills: 0 | Status: ON HOLD | OUTPATIENT
Start: 2019-12-08 | End: 2019-12-19

## 2019-12-08 RX ORDER — ALBUMIN (HUMAN) 12.5 G/50ML
25 SOLUTION INTRAVENOUS
Status: COMPLETED | OUTPATIENT
Start: 2019-12-08 | End: 2019-12-09

## 2019-12-08 RX ORDER — INSULIN LISPRO 100 [IU]/ML
INJECTION, SOLUTION INTRAVENOUS; SUBCUTANEOUS
Qty: 18 ML | Refills: 0 | Status: ON HOLD | OUTPATIENT
Start: 2019-12-08 | End: 2019-12-13

## 2019-12-08 RX ORDER — ALBUMIN (HUMAN) 12.5 G/50ML
25 SOLUTION INTRAVENOUS
Status: DISCONTINUED | OUTPATIENT
Start: 2019-12-08 | End: 2019-12-08

## 2019-12-08 RX ORDER — POTASSIUM CHLORIDE 20 MEQ/1
40 TABLET, EXTENDED RELEASE ORAL ONCE
Status: COMPLETED | OUTPATIENT
Start: 2019-12-08 | End: 2019-12-08

## 2019-12-08 NOTE — PROGRESS NOTES
[facility]  Endocrinology Progress Note    Baljit Malik Patient Status:  Inpatient    11/3/1940 MRN [id number]   [facility] 4NW-A Attending Luciana Epley, MD   [facility] Day # 15 PCP Pantera Quintanilla MD     CC: Patient presents with:  A Units Oral TID CC   • docusate sodium  100 mg Oral BID   • Levothyroxine Sodium  100 mcg Oral Before breakfast   • Venlafaxine HCl ER  150 mg Oral Daily   • Alfuzosin HCl ER  10 mg Oral Daily with breakfast       Labs  Reviewed in EPIC    Component      La of care discussed with patient/family at bedside. All questions/concerns addressed as fully as possible.      Endocrine service will continue to follow closely while the patient is in the hospital. Please feel free to contact us with any questions or concer

## 2019-12-08 NOTE — PROGRESS NOTES
Gastroenterology Progress Note  Patient Name: Nan Lea  Chief Complaint: ascites, SBP  S: Pt reports that abdominal pain has improved. He is passing gas. He tolerated FLD and now is on soft diet.    O: /51 (BP Location: Right arm)   Pulse 76 has completed >7 days of Abx for SBP with culture that is negative. Given that ascites not associated with cirrhosis, he is not necessarily at high risk for redeveloping SBP requiring prophylaxis.  If he were to develops SBP again, I would start him on prop

## 2019-12-08 NOTE — PLAN OF CARE
Assumed care @ 0730. Patient c/o moderate abdominal pain. Patient's abdomen soft, distended, non-tender, bowel sounds active. Patient with poor appettite, denies nausea. Respirations non-labored, lungs sound diminished, O2 sat 88-92@ on room air.   Patient'

## 2019-12-08 NOTE — PROGRESS NOTES
[facility]  Nephrology Progress Note    [place]y Park Patient Status:  Inpatient    11/3/1940 MRN [id number]   [facility] 4NW-A Attending Kizzy Granados MD   [facility] Day # 15 PCP Tarah Colorado MD       SUBJECTIVE:  Stable this AM MCV  --    < > 95.0 94.8 95.2  --  95.6 96.1   PLT  --    < > 127.0* 136.0* 161.0  --  203.0 177.0   INR 1.42*  --   --   --   --  1.32* 1.26*  --     < > = values in this interval not displayed.        Recent Labs   Lab 12/02/19  0640  12/04/19  0545 12/ PRN  ipratropium-albuterol (DUONEB) nebulizer solution 3 mL, 3 mL, Nebulization, Q4H PRN  traMADol HCl (ULTRAM) tab 50 mg, 50 mg, Oral, Q8H PRN    Or  traMADol HCl (ULTRAM) tab 100 mg, 100 mg, Oral, Q8H PRN  Pantoprazole Sodium (PROTONIX) EC tab 40 mg, 40 HCl ER (UROXATRAL) 24 hr tab 10 mg, 10 mg, Oral, Daily with breakfast          Impression/Plan:    #1. NEMO- due to ATN related to contrast nephropathy in setting of vol depletion. Renal fxn stable today. Attempting to diurese as able    #2.  Anasarca- ha

## 2019-12-08 NOTE — PROGRESS NOTES
Heme/Onc Progress Note - Alejandro      Chief Complaint:    Follow up for evaluation and management of pancreatic cancer. Interim History:      He continues to feel better. He was able to walk in the bullard with his daughter this morning.  She says he had less fluid  MEDICATION:  15 cc of 1% Lidocaine for local anesthetic. COMPLICATIONS:  None. LABORATORY:  1.2 L     =====  CONCLUSION:  Ultrasound-guided paracentesis was performed without complication.     Impression:     1)  Abd pain with nausea: Had partial s

## 2019-12-08 NOTE — PROGRESS NOTES
[facility]ITAL[facility]  Progress Note     Darien Singh Patient Status:  Inpatient    11/3/1940 MRN [id number]   [facility] 4SW-A Attending Yulia Gaitan MD   [facility] Day # 15 PCP Jerel Everett MD     Chief Complaint: Sepsis     S: Patient d ALKPHO 101 108 94   AST 67* 137* 117*   ALT 43 65* 90*   BILT 2.1* 1.8 1.7   TP 5.4* 5.2* 5.7*       Estimated Creatinine Clearance: 46.5 mL/min (A) (based on SCr of 1.39 mg/dL (H)).     Recent Labs   Lab 12/02/19  0917 12/06/19  1209 12/07/19  0418   PTP Endo  7. Rash to back, drug rash? Improving  1. Hydrocortisone topical   8. Hypothyroidism  1. Synthroid  9. BPH  1. Alfuzosin  10. Leukocytosis, improving  11. Erminio Early, treated  12. Acute kidney injury, resolved  13.  Essential hypertension    Quality:  · D

## 2019-12-08 NOTE — PLAN OF CARE
Assumed care @ 0730. Patient c/o moderate abdominal pain. Patient's abdomen distended, soft, bowel sounds active. Patient with poor appetite, denies nausea. Respirations non-labored, lungs sound diminished, O2 sat 88-02% on room air.  Rashes noted on upper

## 2019-12-08 NOTE — PLAN OF CARE
Problem: PAIN - ADULT  Goal: Verbalizes/displays adequate comfort level or patient's stated pain goal  Description  INTERVENTIONS:  - Encourage pt to monitor pain and request assistance  - Assess pain using appropriate pain scale  - Administer analgesics assess for edema, trend weights  Outcome: Progressing     Problem: RESPIRATORY - ADULT  Goal: Achieves optimal ventilation and oxygenation  Description  INTERVENTIONS:  - Assess for changes in respiratory status  - Assess for changes in mentation and behav Provide frequent reorientation  - Promote wakefulness i.e. lights on, blinds open  - Promote sleep, encourage patient's normal rest cycle i.e. lights off, TV off, minimize noise and interruptions  - Encourage family to assist in orientation and promotion o

## 2019-12-09 ENCOUNTER — TELEPHONE (OUTPATIENT)
Dept: PHYSICAL THERAPY | Age: 79
End: 2019-12-09

## 2019-12-09 ENCOUNTER — APPOINTMENT (OUTPATIENT)
Dept: PHYSICAL THERAPY | Age: 79
End: 2019-12-09
Attending: FAMILY MEDICINE
Payer: MEDICARE

## 2019-12-09 PROCEDURE — 99232 SBSQ HOSP IP/OBS MODERATE 35: CPT | Performed by: INTERNAL MEDICINE

## 2019-12-09 PROCEDURE — 99233 SBSQ HOSP IP/OBS HIGH 50: CPT | Performed by: HOSPITALIST

## 2019-12-09 RX ORDER — CIPROFLOXACIN 750 MG/1
750 TABLET, FILM COATED ORAL DAILY
Qty: 15 TABLET | Refills: 0 | Status: ON HOLD | OUTPATIENT
Start: 2019-12-11 | End: 2019-12-18

## 2019-12-09 NOTE — PLAN OF CARE
A&Ox4. Sleeping on/off throughout the day but wakes up easily to voice, no drowsiness/lethargy noted this shift. Patient denies any pain- reports generalized discomfort. Repositioned throughout the day. Abdomen distended.  Paracentesis site to left abdo

## 2019-12-09 NOTE — OCCUPATIONAL THERAPY NOTE
OCCUPATIONAL THERAPY TREATMENT NOTE - INPATIENT     Room Number: 801/736-P  Session: 2/5    Number of Visits to Meet Established Goals: 5    Presenting Problem: (sepsis, ERCP with stone extraction, ascites)    History related to current admission: Pt is 79 pain/belching    • Frequent urination    • High blood pressure    • History of blood transfusion 2016   • Irregular bowel habits    • Jaundice    • Kidney stone    • Leg swelling    • Nausea    • Osteoarthritis    • Pain in joints    • Personal history of (Comment)    WEIGHT BEARING RESTRICTION  Weight Bearing Restriction: None                PAIN ASSESSMENT  Rating: Unable to rate  Location: abdomen  Management Techniques: Activity promotion; Body mechanics;Breathing techniques;Relaxation;Repositioning patient questions and concerns addressed;SCDs in place; Alarm set; Family present    ASSESSMENT   Pt seen this day for self care ADLs and functional transfers. Pt continues to progress with supine > sit CGA, and sit > stand SUPV.  Pt continues to require MAX

## 2019-12-09 NOTE — PROGRESS NOTES
[facility]  Nephrology Progress Note    Vicky Lui Attending:  Pollo Knapp MD       Assessment and Plan:    1) NEMO- due to dehydration / contrast nephropathy (CT 11/24) in setting of sepsis- resolved     2) Klebsiella bacteremia / sepsis / cho 12/09/2019    K 3.9 12/09/2019     12/09/2019    CO2 31.0 12/09/2019     12/09/2019    CA 7.9 12/09/2019    ALB 2.4 12/09/2019    ALKPHO 89 12/09/2019    BILT 1.5 12/09/2019    TP 5.8 12/09/2019    AST 44 12/09/2019    ALT 57 12/09/2019    PGL 15 g, Oral, Q15 Min PRN    Or  Glucose-Vitamin C (DEX-4) chewable tab 4 tablet, 4 tablet, Oral, Q15 Min PRN    Or  dextrose 50 % injection 50 mL, 50 mL, Intravenous, Q15 Min PRN    Or  glucose (DEX4) oral liquid 30 g, 30 g, Oral, Q15 Min PRN    Or  Glucose

## 2019-12-09 NOTE — PROGRESS NOTES
[facility]  Progress Note     Krystyna Ivy Patient Status:  Inpatient    11/3/1940 MRN [id number]   [facility] 4SW-A Attending Marylou Alcantar MD   [facility] Day # 13 PCP Dolores Bartlett MD     Chief Complaint: Sepsis     S: Patient r mL/min (A) (based on SCr of 1.4 mg/dL (H)). Recent Labs   Lab 12/06/19  1209 12/07/19  0418   PTP 17.0* 16.4*   INR 1.32* 1.26*       No results for input(s): TROP, CK in the last 168 hours. Imaging: Imaging data reviewed in Epic.     Medications injury, resolved  13. Essential hypertension  14. Constipation  1.  Bowel care    Quality:  · DVT Prophylaxis: SCD, Heparin  · CODE status: FULL     Estimated date of d/c: in next 24hrs, acute rehab    Discussed with patient, caregiver, MD at bedside and we

## 2019-12-09 NOTE — CM/SW NOTE
Interdisciplinary Rounds: 12/09/19  Admitted: 11/24/2019 LOS: 15  Disciplines in attendance: charge nurse, staff nurse, CM, [address] and discharge plan reviewed. Segundo Tolbertots with Miesha Noble who states will have bed available on 12/10.     Active issues nee

## 2019-12-09 NOTE — PHYSICAL THERAPY NOTE
Attempted to see Pt this AM - RN aware of attempt. Pt's daughter ambulated with the patient earlier today, and requesting writer re-attempt later today. Will f/u later today if time permits, after all other patients are attempted per tentative schedule.

## 2019-12-09 NOTE — PROGRESS NOTES
[facility]                INFECTIOUS DISEASE PROGRESS NOTE    Kaitlynn Monk Patient Status:  Inpatient    11/3/1940 MRN [id number]   [facility] 4SW-A Attending Carline Walton MD   [facility] Day # 13 PCP MD Armida Redmond result   Component  Ref Range & Units 12/6/19 1541   Neutrophil Oth Body Fluid  % 55    Lymphocyte Oth Body Fluid  % 11    Mono/Mac/Histio Oth Bf Fluid  % 34    Eosinophil Other Body Fluid  % 0    Basophil Other Body Fluid  % 0                  No results ([facility] [address]     Cholangitis     Peritonitis ([facility] [address]     Anasarca     Anemia complicating neoplastic disease      ASSESSMENT/PLAN:  1.  Klebsiella bacteremia/cholangitis/obstructed stent/peritonitis  SP ERCP 11/25  ---blood cx 11/24 showing Klebsiella  SP Paracentes

## 2019-12-09 NOTE — PROGRESS NOTES
Heme/Onc Progress Note    Patient Name: Crispin Lopez   YOB: 1940   Medical Record Number: [id number]   CSN: [id number]   Attending Physician: Yen Mcgee M.D. Subjective:  Gradual improvement continues. No fevers.   Eating small tab 50 mg, 50 mg, Oral, Q8H PRN **OR** traMADol HCl (ULTRAM) tab 100 mg, 100 mg, Oral, Q8H PRN  •  Pantoprazole Sodium (PROTONIX) EC tab 40 mg, 40 mg, Oral, QAM AC  •  Insulin Aspart Pen (NOVOLOG) 100 UNIT/ML flexpen 0-60 Units, 0-60 Units, Subcutaneous, T Daily with breakfast    Physical Examination:  General: Patient is alert and oriented x 3, not in acute distress.   Vital Signs: /54 (BP Location: Right arm)   Pulse 75   Temp 98.1 °F (36.7 °C) (Oral)   Resp 18   Ht 1.829 m (6')   Wt 113.6 kg (250 lb Collection Time: 12/08/19  8:52 PM   Result Value Ref Range    POC Glucose 234 (H) 70 - 99 mg/dL   COMP METABOLIC PANEL (14)    Collection Time: 12/09/19  5:44 AM   Result Value Ref Range    Glucose 172 (H) 70 - 99 mg/dL    Sodium 137 136 - 145 mmol/L    P Radiology:  No new imaging. Impression and Plan:  1)  Abd pain with nausea: Had partial stent obstruction - this has been revised.  Had positive blood culture on admit - Klebsiella - likely biliary source. Now on ceftriaxone.  Repeat Paracentesis s

## 2019-12-09 NOTE — PROGRESS NOTES
[facility]  Progress Note     Sania Martini Patient Status:  Inpatient    11/3/1940 MRN [id number]   [facility] 4SW-A Attending Chandler Mitchell MD   [facility] Day # 13 PCP Martina Mclean MD     Chief Complaint: Sepsis     S: Patient s PTP 17.0* 16.4*   INR 1.32* 1.26*       No results for input(s): TROP, CK in the last 168 hours. Imaging: Imaging data reviewed in Epic.     Medications:   • nystatin  5 mL Oral QID   • lidocaine-menthol  1 patch Transdermal Daily   • insulin dete 8. Hypothyroidism  1. Synthroid  9. BPH  1. Alfuzosin  10. Leukocytosis, improving  11. Link Spindle, treated  12. Acute kidney injury, resolved  13.  Essential hypertension    Quality:  · DVT Prophylaxis: SCD, Heparin  · CODE status: FULL     Discharge plannin

## 2019-12-09 NOTE — PLAN OF CARE
Problem: PAIN - ADULT  Goal: Verbalizes/displays adequate comfort level or patient's stated pain goal  Description  INTERVENTIONS:  - Encourage pt to monitor pain and request assistance  - Assess pain using appropriate pain scale  - Administer analgesics assess for edema, trend weights  Outcome: Progressing     Problem: RESPIRATORY - ADULT  Goal: Achieves optimal ventilation and oxygenation  Description  INTERVENTIONS:  - Assess for changes in respiratory status  - Assess for changes in mentation and behav care  Description  Interventions:  - Assess ability and encourage patient to participate in ADLs to maximize function  - Promote sitting position while performing ADLs such as feeding, grooming, and bathing  - Educate and encourage patient/family in [place]

## 2019-12-09 NOTE — PROGRESS NOTES
[facility]  Endocrinology Progress Note    Liseth Older Patient Status:  Inpatient    11/3/1940 MRN [id number]   [facility] 4NW-A Attending Amrik Thomas MD   [address] Day # 13 PCP Bertrand Berrios MD     CC: Patient presents with:  A Oral Before breakfast   • Venlafaxine HCl ER  150 mg Oral Daily   • Alfuzosin HCl ER  10 mg Oral Daily with breakfast       Labs  Reviewed in EPIC    Component      Latest Ref Rng & Units 11/25/2019   HEMOGLOBIN A1c      <5.7 % 9.2 (H)     Lab Results   Co as possible. Endocrine service will continue to follow closely while the patient is in the hospital. Please feel free to contact us with any questions or concerns.      Mike Glover MD  Endocrinology, Diabetes, and Metabolism  [facility]

## 2019-12-09 NOTE — PHYSICAL THERAPY NOTE
PHYSICAL THERAPY TREATMENT NOTE - INPATIENT    Room Number: 798/491-S     Session: 4   Number of Visits to Meet Established Goals: 5    Presenting Problem: Septic Shock     History related to current admission: Pt is [de-identified]year old male admitted on 11/24/201 Jaundice    • Kidney stone    • Leg swelling    • Nausea    • Osteoarthritis    • Pain in joints    • Personal history of antineoplastic chemotherapy 09/2018   • PONV (postoperative nausea and vomiting)    • Presence of other cardiac implants and grafts Ratin  Location: denied  Management Techniques: Activity promotion; Body mechanics;Repositioning    BALANCE                                                                                                                     Static Sitting: Fair -  Dyn (poor eccentric lowering)    Comments related to Mobility: Pt required extra time for all functional mobility, and willing to work on ambulation in hallway. Pt requesting toileting prior to gait training.   Pt is able to complete standing desean care - see O demonstrate supine - sit EOB @ level: minimum assistance-met, updated to CGA      Goal #2 Patient is able to demonstrate transfers Sit to/from Stand at assistance level: moderate assistance-met updated to min      Goal #3 Patient is able to ambulate 150 fe

## 2019-12-09 NOTE — IMAGING NOTE
Megan Oliva RN verbalized understanding to instructions given  In preparation for Paracentesis test tomorrow at 1 pm: Clear liquids starting at 0900 am gina, patent saline lock, STAT INR in the morning, hold Heparin 8-10 hours prior to 2 pm tomorrow.

## 2019-12-09 NOTE — CONSULTS
[facility]  Report of Inpatient Wound Care Consultation     Karely Waite Patient Status:  Inpatient    11/3/1940 MRN [id number]   [facility] 4NW-A Attending Michael Youngblood MD   [facility] Day # 13 PCP Kye Mcdaniel MD     REASON FOR CHOLANGIOPANCREATOGRAPHY (ERCP) N/A 10/9/2018    Performed by Barbara Maya MD at [address] [place] [address] (ERCP) N/A 8/9/2018    Performed by Barbara Maay MD at [address] --   --    INR 1.32*  --  1.26*  --   --   --   --   --   --   --   --   --    PGLU  --    < >  --    < >  --    < >  --    < > 234*  --  171* 231*    < > = values in this interval not displayed.        Imaging:   See EMR  Microbiology:   See EMR    ASSESSM

## 2019-12-10 ENCOUNTER — APPOINTMENT (OUTPATIENT)
Dept: ULTRASOUND IMAGING | Facility: HOSPITAL | Age: 79
DRG: 919 | End: 2019-12-10
Attending: HOSPITALIST
Payer: MEDICARE

## 2019-12-10 VITALS
SYSTOLIC BLOOD PRESSURE: 139 MMHG | HEART RATE: 76 BPM | BODY MASS INDEX: 33.91 KG/M2 | WEIGHT: 250.38 LBS | RESPIRATION RATE: 18 BRPM | HEIGHT: 72 IN | OXYGEN SATURATION: 91 % | TEMPERATURE: 99 F | DIASTOLIC BLOOD PRESSURE: 56 MMHG

## 2019-12-10 PROCEDURE — 99239 HOSP IP/OBS DSCHRG MGMT >30: CPT | Performed by: HOSPITALIST

## 2019-12-10 PROCEDURE — 0W9G3ZZ DRAINAGE OF PERITONEAL CAVITY, PERCUTANEOUS APPROACH: ICD-10-PCS | Performed by: RADIOLOGY

## 2019-12-10 PROCEDURE — 99232 SBSQ HOSP IP/OBS MODERATE 35: CPT | Performed by: INTERNAL MEDICINE

## 2019-12-10 PROCEDURE — 49083 ABD PARACENTESIS W/IMAGING: CPT | Performed by: HOSPITALIST

## 2019-12-10 RX ORDER — HYDROCODONE BITARTRATE AND ACETAMINOPHEN 10; 325 MG/1; MG/1
1 TABLET ORAL EVERY 4 HOURS PRN
Status: DISCONTINUED | OUTPATIENT
Start: 2019-12-10 | End: 2019-12-10

## 2019-12-10 RX ORDER — BUMETANIDE 1 MG/1
1 TABLET ORAL DAILY
Status: DISCONTINUED | OUTPATIENT
Start: 2019-12-10 | End: 2019-12-10

## 2019-12-10 RX ORDER — POLYETHYLENE GLYCOL 3350 17 G/17G
17 POWDER, FOR SOLUTION ORAL DAILY PRN
Qty: 30 EACH | Refills: 0 | Status: ON HOLD | OUTPATIENT
Start: 2019-12-10 | End: 2019-12-19

## 2019-12-10 RX ORDER — BUMETANIDE 1 MG/1
1 TABLET ORAL DAILY
Qty: 30 TABLET | Refills: 0 | Status: ON HOLD | OUTPATIENT
Start: 2019-12-11 | End: 2019-12-19

## 2019-12-10 RX ORDER — HYDROCODONE BITARTRATE AND ACETAMINOPHEN 10; 325 MG/1; MG/1
1 TABLET ORAL EVERY 4 HOURS PRN
Qty: 20 TABLET | Refills: 0 | Status: ON HOLD | OUTPATIENT
Start: 2019-12-10 | End: 2019-12-19

## 2019-12-10 RX ORDER — MAGNESIUM OXIDE 400 MG (241.3 MG MAGNESIUM) TABLET
TABLET
Qty: 30 TABLET | Refills: 0 | Status: ON HOLD | OUTPATIENT
Start: 2019-12-10 | End: 2019-12-19

## 2019-12-10 RX ORDER — POTASSIUM CHLORIDE 20 MEQ/1
40 TABLET, EXTENDED RELEASE ORAL ONCE
Status: COMPLETED | OUTPATIENT
Start: 2019-12-10 | End: 2019-12-10

## 2019-12-10 RX ORDER — HYDROCODONE BITARTRATE AND ACETAMINOPHEN 5; 325 MG/1; MG/1
1 TABLET ORAL ONCE
Status: COMPLETED | OUTPATIENT
Start: 2019-12-10 | End: 2019-12-10

## 2019-12-10 RX ORDER — PSEUDOEPHEDRINE HCL 30 MG
100 TABLET ORAL 2 TIMES DAILY
Qty: 60 CAPSULE | Refills: 0 | Status: ON HOLD | OUTPATIENT
Start: 2019-12-10 | End: 2019-12-13

## 2019-12-10 NOTE — DISCHARGE SUMMARY
[facility]  DISCHARGE SUMMARY     Darien Singh Patient Status:  Inpatient    11/3/1940 MRN [id number]   [facility] 4NW-A Attending Nicole Perez MD   [facility] Day # 12 PCP Jerel Everett MD     Date of Admission: 2019  Date was admitted for further work-up and GI/oncology was consulted. He underwent ERCP with sludge/stone extraction and stent placement. Patient developed septic shock due to cholangitis and was transferred to ICU.   He received IV fluids, broad-spectrum antib DIAGNOSIS/INDICATION: Metastatic pancreas cancer, abdominal pain, abnormal LFT's, klebsiella bacteremia  POSTOPERTATIVE DIAGNOSIS: Same, partial stent occlusion, biliary sludge, periampullary diverticulum, suspected infiltrative process antrum and between [facility]  Start taking on:  December 11, 2019      Take 1 tablet (1 mg total) by mouth daily. Per Dr. Laureano Canales recommendations.    Quantity:  30 tablet  Refills:  0     Ciprofloxacin HCl 750 MG Tabs  Commonly known as:  CIPRO  Start taking on:  December 11, 2019 [address]-78385 units Cpep  Generic drug:  Pancrelipase (Lip-Prot-Amyl)      TAKE 1 CAPSULE BY MOUTH THREE TIMES DAILY WITH EACH MEAL AS DIRECTED   Refills:  1     Levothyroxine Sodium 100 MCG Tabs  Commonly known as:  SYNTHROID      Take 100 mcg by mouth irineo [address]  [phone]    In 2 weeks      Wali Enamorado MD  [address] [facility]  [phone]    Schedule an appointment as soon as possible for a visit  Schedule appointment with Dr. Anisha Pina after disc this admission, repeat BC negative, no biliary leak, repeat peritoneal WBC better  2. Transaminitis, now d/t passive congestion, improving  1. Abx per ID > Cipro, TLC pulled prior to DC  2. Pain control  3. Diet as tolerated  4.  No plan for cholecystostomy

## 2019-12-10 NOTE — PLAN OF CARE
Patient back from paracentesis. Per report received,took out 2750cc fluids. 2 sites on the L lower abdomen dermabonded. No complications noted. Patient is awake & alert, daughter remains at bedside. With stable vital signs.  Plan is for discharge to [facility]

## 2019-12-10 NOTE — PROGRESS NOTES
[facility]                INFECTIOUS DISEASE PROGRESS NOTE    Susan Wilhelm Patient Status:  Inpatient    11/3/1940 MRN [id number]   [facility] 4SW-A Attending Vaughn Godinez MD   [facility] Day # 12 PCP MD Claudette Banks Component  Ref Range & Units 12/6/19 1541   Neutrophil Oth Body Fluid  % 55    Lymphocyte Oth Body Fluid  % 11    Mono/Mac/Histio Oth Bf Fluid  % 34    Eosinophil Other Body Fluid  % 0    Basophil Other Body Fluid  % 0                  No results found f Cholangitis     Peritonitis ([facility] [place] [address]     Anasarca     Anemia complicating neoplastic disease      ASSESSMENT/PLAN:  1.  Klebsiella bacteremia/cholangitis/obstructed stent/peritonitis  SP ERCP 11/25  ---blood cx 11/24 showing Klebsiella  SP Paracentesis - 12/2

## 2019-12-10 NOTE — PLAN OF CARE
Up sitting in the chair. Awake & alert,no SOB. No glycemic reactions. NPo maintained for paracentesis at 1300. Norco for pain given for c/o R shoulder pain, w/ relief noted. Tolerating IV antibiotics. No adverse reactions noted. Bumex given w/ potassium. Jennifer Dose

## 2019-12-10 NOTE — PROGRESS NOTES
[facility]  Nephrology Progress Note    Ford Salinas Attending:  Tisha Gomez MD       Assessment and Plan:    1) NEMO- due to dehydration / contrast nephropathy (CT 11/24) in setting of sepsis- resolved     2) Klebsiella bacteremia / sepsis / cho WBC 12.1 12/10/2019    HGB 9.8 12/10/2019    HCT 29.6 12/10/2019    .0 12/10/2019    CREATSERUM 1.40 12/10/2019    BUN 23 12/10/2019     12/10/2019    K 3.4 12/10/2019     12/10/2019    CO2 32.0 12/10/2019    GLU 74 12/10/2019    CA 8.1 Units, 0-60 Units, Subcutaneous, TID CC and HS  melatonin tab TABS 3 mg, 3 mg, Oral, Nightly  pancrelipase (Lip-Prot-Amyl) (ZENPEP) DR particles cap 20,000 Units, 20,000 Units, Oral, TID CC  lidocaine (UROJET) 2 % urethral jelly 20 mL, 20 mL, Urethral, PRN

## 2019-12-10 NOTE — IMAGING NOTE
Patient had 2750 cc removed from left side of abdomen by Dr. Marlin Mayo, Dermabond applied and CDI. Dermabond was also applied to previous paracentesis site after ostomy bag removed (had an ostomy bag for drainage) both sites were applied by  tech.  Patient

## 2019-12-10 NOTE — PROGRESS NOTES
Heme/Onc Progress Note    Patient Name: Denise Ardon   YOB: 1940   Medical Record Number: [id number]   CSN: [id number]   Attending Physician: Shanell Noonan M.D. Subjective:  Transfer to [place] [address] planned for possibly later today.   Plan p Pantoprazole Sodium (PROTONIX) EC tab 40 mg, 40 mg, Oral, QAM AC  •  Insulin Aspart Pen (NOVOLOG) 100 UNIT/ML flexpen 0-60 Units, 0-60 Units, Subcutaneous, TID CC  •  Insulin Aspart Pen (NOVOLOG) 100 UNIT/ML flexpen 0-60 Units, 0-60 Units, Subcutaneous, TI distress. Vital Signs: /81 (BP Location: Right arm)   Pulse 87   Temp 97.6 °F (36.4 °C) (Oral)   Resp 20   Ht 1.829 m (6')   Wt 113.6 kg (250 lb 6.4 oz)   SpO2 96%   BMI 33.96 kg/m²   HEENT: EOMs intact. PERRL. Oropharynx is clear. Neck: No JVD.  Jerri Alejandro Result Value Ref Range    Magnesium 2.0 1.6 - 2.6 mg/dL   PHOSPHORUS    Collection Time: 12/10/19  5:42 AM   Result Value Ref Range    Phosphorus 3.1 2.5 - 4.9 mg/dL   PROTHROMBIN TIME (PT)    Collection Time: 12/10/19  5:42 AM   Result Value Ref Range so likely no leak. Clnically is definitely doing better.  ID following and hemodynamically stable. GI and surgery following. repeat para today for comfort. Plan for possible dishcharge to [facility] today?     2)  Pain - prn narcotics.  Less need for pain

## 2019-12-10 NOTE — PROGRESS NOTES
[facility]  Endocrinology Progress Note    Dario Sapp Patient Status:  Inpatient    11/3/1940 MRN [id number]   [facility] 4NW-A Attending Lianet You MD   [facility] Day # 12 PCP Jacky Tracy MD     CC: Patient presents with:  A 150 mg Oral Daily   • Alfuzosin HCl ER  10 mg Oral Daily with breakfast       Labs  Reviewed in EPIC    Component      Latest Ref Rng & Units 11/25/2019   HEMOGLOBIN A1c      <5.7 % 9.2 (H)     Lab Results   Component Value Date    WBC 12.1 12/10/2019    H at bedside. All questions/concerns addressed as fully as possible. Endocrine service will continue to follow closely while the patient is in the hospital. Please feel free to contact us with any questions or concerns.      Delano Mota MD  Endocrinology,

## 2019-12-10 NOTE — PLAN OF CARE
[facility] Report given to Nurse Murphy Carvalho. Patient's 2 daughters at bedside at this time.

## 2019-12-10 NOTE — PLAN OF CARE
NURSING DISCHARGE NOTE    Discharged Rehab facility via Ambulance. Accompanied by [facility] member/ ambulance crew  Belongings Taken by patient/family.

## 2019-12-10 NOTE — PLAN OF CARE
Triple lumen R aseptically removed as ordered by MD.Pressure dressing applied,no bleeding noted. 4x4 pressure dressing applied covered w/ clear dressing. Instructed patient to stay flat for 30mins after removal of catheter.

## 2019-12-10 NOTE — DIETARY MALNUTRITION NOTE
[facility]     NUTRITION FOLLOW UP ASSESSMENT     Pt meets moderate malnutrition criteria.     CRITERIA FOR MALNUTRITION DIAGNOSIS:  Criteria for non-severe malnutrition diagnosis: chronic illness related to energy intake less than 75% for greater than nutrition trigger. Currently NPO. Out of room for ERCP at time of visit. GI on consult. Pt with gastritis or biliary stent issues. Per EMR, pt with poor PO intakes the past 3-4 days and 1 episode of vomiting PTA.  Noted PMH:pancreatic CA with mets to liver day (goal not met, pt said he never received ONS)-called diet office to confirm ONS order.    4. Improved skin integrity within the next 10 days     MEDICATIONS:  Bumex, Nystatin, Pancrelipase, Insulin     LABS:  Glu:74, K+:3.4, BUN:23, Cr:1.40, GFR:47, LFT

## 2019-12-10 NOTE — PROGRESS NOTES
[facility]  Nephrology Progress Note    Ida Vega Attending:  Loreta Cedeno MD       Assessment and Plan:    1) NEMO- due to dehydration / contrast nephropathy (CT 11/24) in setting of sepsis- resolved     2) Klebsiella bacteremia / sepsis / cho WBC 12.1 12/10/2019    HGB 9.8 12/10/2019    HCT 29.6 12/10/2019    .0 12/10/2019    CREATSERUM 1.40 12/10/2019    BUN 23 12/10/2019     12/10/2019    K 3.4 12/10/2019     12/10/2019    CO2 32.0 12/10/2019    GLU 74 12/10/2019    CA 8.1 (Lip-Prot-Amyl) (ZENPEP) DR particles cap 20,000 Units, 20,000 Units, Oral, TID CC  lidocaine (UROJET) 2 % urethral jelly 20 mL, 20 mL, Urethral, PRN  dextrose 5 % and 0.9 % NaCl infusion, , Intravenous, Continuous PRN  glucose (DEX4) oral liquid 15 g, 15

## 2019-12-10 NOTE — PROGRESS NOTES
[facility]  Progress Note      Ajay Lawson Patient Status:  Inpatient    11/3/1940 MRN [id number]   [facility] 4NW-A Attending Chau Cope MD   [facility] Day # 12 PCP Jonel Gomez MD         Vital Signs:  Blood pressure 140/60,

## 2019-12-10 NOTE — PROGRESS NOTES
SUBJECTIVE: Away for paracentesis    CC:  Impaired ADL and mobility dysfuction due to metastatic pancreatic CA  Interval History:Had partial stent obstruction - this has been revised.  Had positive blood culture on admit - Klebsiella - likely biliary sourc 0505 133/81 97.6 °F (36.4 °C) Oral 87 20 96 %   12/09/19 2349 134/58 97.8 °F (36.6 °C) Oral 81 20 98 %   12/09/19 2039 110/48 97.8 °F (36.6 °C) Oral 75 20 96 %   12/09/19 1740 127/54 97.8 °F (36.6 °C) Oral 73 20 95 %       Intake/Output:    Intake/Output S

## 2019-12-10 NOTE — PROGRESS NOTES
[facility]  Progress Note     Spears Killer Patient Status:  Inpatient    11/3/1940 MRN [id number]   [place] 4SW-A Attending Ynaci Thurman MD   [facility] Day # 12 PCP Marychuy Hemphill MD     Chief Complaint: Sepsis     S: Patient r 6.1*       Estimated Creatinine Clearance: 46.2 mL/min (A) (based on SCr of 1.4 mg/dL (H)).     Recent Labs   Lab 12/06/19  1209 12/07/19  0418 12/10/19  0542   PTP 17.0* 16.4* 16.4*   INR 1.32* 1.26* 1.26*       No results for input(s): TROP, CK in the las 8. Hypothyroidism  1. Synthroid  9. BPH  1. Alfuzosin  10. Leukocytosis, improved  11. Gregery [place], treated  12. Acute kidney injury, resolved  13. Essential hypertension  14. Constipation  1.  Bowel care, had BM    Quality:  · DVT Prophylaxis: SCD, Heparin on

## 2019-12-10 NOTE — CM/SW NOTE
Patient discharging at 5 pm to [facility] acute rehab via [facility] #[phone] to room 2205. Please call report to #[phone]. [facility] unable to accommodate 5 pm time~ Their first availability was 7 pm. Patient and family informed of

## 2019-12-11 ENCOUNTER — APPOINTMENT (OUTPATIENT)
Dept: PHYSICAL THERAPY | Age: 79
End: 2019-12-11
Attending: FAMILY MEDICINE
Payer: MEDICARE

## 2019-12-12 ENCOUNTER — MOBILE ENCOUNTER (OUTPATIENT)
Dept: INTERNAL MEDICINE CLINIC | Facility: CLINIC | Age: 79
End: 2019-12-12

## 2019-12-12 NOTE — PROGRESS NOTES
Contacted by lab overnight regarding peritoneal culture with yeast. Discussed with Dr. Catalina De La Rosa, will add Fluconazole 200mg PO daily x 14 days. Contacted patient’s RN at [employer]. Patient is doing well - no fever, hypotension or tachycardia.      Per RN, she ca

## 2019-12-13 ENCOUNTER — APPOINTMENT (OUTPATIENT)
Dept: CT IMAGING | Facility: HOSPITAL | Age: 79
DRG: 871 | End: 2019-12-13
Attending: NURSE PRACTITIONER
Payer: MEDICARE

## 2019-12-13 ENCOUNTER — APPOINTMENT (OUTPATIENT)
Dept: GENERAL RADIOLOGY | Facility: HOSPITAL | Age: 79
DRG: 871 | End: 2019-12-13
Attending: EMERGENCY MEDICINE
Payer: MEDICARE

## 2019-12-13 ENCOUNTER — HOSPITAL ENCOUNTER (INPATIENT)
Facility: HOSPITAL | Age: 79
LOS: 6 days | Discharge: HOME HEALTH | DRG: 871 | End: 2019-12-19
Attending: EMERGENCY MEDICINE | Admitting: HOSPITALIST
Payer: MEDICARE

## 2019-12-13 DIAGNOSIS — R18.8 OTHER ASCITES: ICD-10-CM

## 2019-12-13 DIAGNOSIS — E11.65 UNCONTROLLED TYPE 2 DIABETES MELLITUS WITH HYPERGLYCEMIA (HCC): ICD-10-CM

## 2019-12-13 DIAGNOSIS — R10.9 ABDOMINAL PAIN, ACUTE: ICD-10-CM
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
## 2019-12-13 DIAGNOSIS — D72.829 LEUKOCYTOSIS, UNSPECIFIED TYPE: Primary | ICD-10-CM

## 2019-12-13 DIAGNOSIS — M17.12 OSTEOARTHROSIS, LOCALIZED, PRIMARY, KNEE, LEFT: ICD-10-CM

## 2019-12-13 DIAGNOSIS — M75.122 COMPLETE TEAR OF LEFT ROTATOR CUFF: ICD-10-CM

## 2019-12-13 DIAGNOSIS — N17.9 AKI (ACUTE KIDNEY INJURY) (HCC): ICD-10-CM

## 2019-12-13 DIAGNOSIS — J18.9 NOSOCOMIAL PNEUMONIA: ICD-10-CM

## 2019-12-13 DIAGNOSIS — Z96.642 S/P HIP REPLACEMENT, LEFT: ICD-10-CM

## 2019-12-13 PROCEDURE — 99223 1ST HOSP IP/OBS HIGH 75: CPT | Performed by: HOSPITALIST

## 2019-12-13 PROCEDURE — 74176 CT ABD & PELVIS W/O CONTRAST: CPT | Performed by: NURSE PRACTITIONER

## 2019-12-13 PROCEDURE — 71045 X-RAY EXAM CHEST 1 VIEW: CPT | Performed by: EMERGENCY MEDICINE

## 2019-12-13 RX ORDER — ACETAMINOPHEN 325 MG/1
650 TABLET ORAL
COMMUNITY
End: 2020-01-30

## 2019-12-13 RX ORDER — HYDROCODONE BITARTRATE AND ACETAMINOPHEN 10; 325 MG/1; MG/1
2 TABLET ORAL ONCE
Status: COMPLETED | OUTPATIENT
Start: 2019-12-13 | End: 2019-12-13

## 2019-12-13 RX ORDER — TRAMADOL HYDROCHLORIDE 50 MG/1
50 TABLET ORAL EVERY 6 HOURS PRN
Status: DISCONTINUED | OUTPATIENT
Start: 2019-12-13 | End: 2019-12-18

## 2019-12-13 RX ORDER — POLYETHYLENE GLYCOL 3350 17 G/17G
17 POWDER, FOR SOLUTION ORAL DAILY
Status: DISCONTINUED | OUTPATIENT
Start: 2019-12-13 | End: 2019-12-19

## 2019-12-13 RX ORDER — LIDOCAINE 4 G/G
1 PATCH TOPICAL EVERY 24 HOURS
COMMUNITY
End: 2020-01-30

## 2019-12-13 RX ORDER — TRAZODONE HYDROCHLORIDE 50 MG/1
50 TABLET ORAL NIGHTLY PRN
Status: DISCONTINUED | OUTPATIENT
Start: 2019-12-13 | End: 2019-12-19

## 2019-12-13 RX ORDER — TRAMADOL HYDROCHLORIDE 50 MG/1
50 TABLET ORAL EVERY 6 HOURS PRN
Status: ON HOLD | COMMUNITY
End: 2019-12-19

## 2019-12-13 RX ORDER — TRAMADOL HYDROCHLORIDE 50 MG/1
50 TABLET ORAL
Status: DISCONTINUED | OUTPATIENT
Start: 2019-12-14 | End: 2019-12-17

## 2019-12-13 RX ORDER — DEXTROSE MONOHYDRATE 25 G/50ML
50 INJECTION, SOLUTION INTRAVENOUS
Status: DISCONTINUED | OUTPATIENT
Start: 2019-12-13 | End: 2019-12-19

## 2019-12-13 RX ORDER — BISACODYL 10 MG
10 SUPPOSITORY, RECTAL RECTAL DAILY PRN
Status: DISCONTINUED | OUTPATIENT
Start: 2019-12-13 | End: 2019-12-19

## 2019-12-13 RX ORDER — MORPHINE SULFATE 4 MG/ML
2 INJECTION, SOLUTION INTRAMUSCULAR; INTRAVENOUS ONCE
Status: COMPLETED | OUTPATIENT
Start: 2019-12-13 | End: 2019-12-13

## 2019-12-13 RX ORDER — HYDROCODONE BITARTRATE AND ACETAMINOPHEN 10; 325 MG/1; MG/1
1 TABLET ORAL ONCE
Status: COMPLETED | OUTPATIENT
Start: 2019-12-13 | End: 2019-12-13

## 2019-12-13 RX ORDER — FLUCONAZOLE 200 MG/1
200 TABLET ORAL EVERY EVENING
Status: ON HOLD | COMMUNITY
End: 2019-12-18

## 2019-12-13 RX ORDER — ACETAMINOPHEN 325 MG/1
325 TABLET ORAL EVERY 4 HOURS PRN
COMMUNITY
End: 2020-01-30

## 2019-12-13 RX ORDER — TRAMADOL HYDROCHLORIDE 50 MG/1
50 TABLET ORAL
Status: ON HOLD | COMMUNITY
End: 2019-12-19

## 2019-12-13 RX ORDER — SODIUM CHLORIDE 9 MG/ML
INJECTION, SOLUTION INTRAVENOUS CONTINUOUS
Status: DISCONTINUED | OUTPATIENT
Start: 2019-12-13 | End: 2019-12-13

## 2019-12-13 RX ORDER — ONDANSETRON 2 MG/ML
4 INJECTION INTRAMUSCULAR; INTRAVENOUS EVERY 4 HOURS PRN
Status: DISCONTINUED | OUTPATIENT
Start: 2019-12-13 | End: 2019-12-13 | Stop reason: DRUGHIGH

## 2019-12-13 RX ORDER — ONDANSETRON 2 MG/ML
4 INJECTION INTRAMUSCULAR; INTRAVENOUS ONCE
Status: COMPLETED | OUTPATIENT
Start: 2019-12-13 | End: 2019-12-13

## 2019-12-13 RX ORDER — SODIUM CHLORIDE 9 MG/ML
INJECTION, SOLUTION INTRAVENOUS CONTINUOUS
Status: ACTIVE | OUTPATIENT
Start: 2019-12-13 | End: 2019-12-13

## 2019-12-13 RX ORDER — PANTOPRAZOLE SODIUM 40 MG/1
40 TABLET, DELAYED RELEASE ORAL
Status: DISCONTINUED | OUTPATIENT
Start: 2019-12-14 | End: 2019-12-19

## 2019-12-13 RX ORDER — DIAPER,BRIEF,INFANT-TODD,DISP
1 EACH MISCELLANEOUS 2 TIMES DAILY
COMMUNITY
End: 2020-01-30

## 2019-12-13 RX ORDER — HEPARIN SODIUM 5000 [USP'U]/ML
5000 INJECTION, SOLUTION INTRAVENOUS; SUBCUTANEOUS EVERY 8 HOURS SCHEDULED
Status: ON HOLD | COMMUNITY
End: 2019-12-19

## 2019-12-13 RX ORDER — LEVOTHYROXINE SODIUM 0.1 MG/1
100 TABLET ORAL
Status: DISCONTINUED | OUTPATIENT
Start: 2019-12-14 | End: 2019-12-19

## 2019-12-13 RX ORDER — HEPARIN SODIUM 5000 [USP'U]/ML
5000 INJECTION, SOLUTION INTRAVENOUS; SUBCUTANEOUS EVERY 12 HOURS SCHEDULED
Status: DISCONTINUED | OUTPATIENT
Start: 2019-12-13 | End: 2019-12-19

## 2019-12-13 RX ORDER — HYDROCODONE BITARTRATE AND ACETAMINOPHEN 5; 325 MG/1; MG/1
1 TABLET ORAL EVERY 4 HOURS PRN
Status: ON HOLD | COMMUNITY
End: 2019-12-19

## 2019-12-13 RX ORDER — VENLAFAXINE HYDROCHLORIDE 75 MG/1
150 CAPSULE, EXTENDED RELEASE ORAL DAILY
Status: DISCONTINUED | OUTPATIENT
Start: 2019-12-14 | End: 2019-12-19

## 2019-12-13 RX ORDER — HYDROCODONE BITARTRATE AND ACETAMINOPHEN 10; 325 MG/1; MG/1
1 TABLET ORAL EVERY 4 HOURS PRN
Status: DISCONTINUED | OUTPATIENT
Start: 2019-12-13 | End: 2019-12-19

## 2019-12-13 RX ORDER — SENNA AND DOCUSATE SODIUM 50; 8.6 MG/1; MG/1
2 TABLET, FILM COATED ORAL DAILY
Status: DISCONTINUED | OUTPATIENT
Start: 2019-12-14 | End: 2019-12-19

## 2019-12-13 RX ORDER — HYDROMORPHONE HYDROCHLORIDE 1 MG/ML
0.5 INJECTION, SOLUTION INTRAMUSCULAR; INTRAVENOUS; SUBCUTANEOUS EVERY 30 MIN PRN
Status: ACTIVE | OUTPATIENT
Start: 2019-12-13 | End: 2019-12-13

## 2019-12-13 RX ORDER — ONDANSETRON 2 MG/ML
4 INJECTION INTRAMUSCULAR; INTRAVENOUS EVERY 6 HOURS PRN
Status: DISCONTINUED | OUTPATIENT
Start: 2019-12-13 | End: 2019-12-19

## 2019-12-13 RX ORDER — SENNA AND DOCUSATE SODIUM 50; 8.6 MG/1; MG/1
2 TABLET, FILM COATED ORAL DAILY
Status: ON HOLD | COMMUNITY
End: 2019-12-19

## 2019-12-13 RX ORDER — ALFUZOSIN HYDROCHLORIDE 10 MG/1
10 TABLET, EXTENDED RELEASE ORAL
Status: DISCONTINUED | OUTPATIENT
Start: 2019-12-14 | End: 2019-12-19

## 2019-12-13 RX ORDER — BISACODYL 10 MG
10 SUPPOSITORY, RECTAL RECTAL DAILY PRN
COMMUNITY

## 2019-12-13 RX ORDER — SODIUM CHLORIDE 9 MG/ML
INJECTION, SOLUTION INTRAVENOUS CONTINUOUS
Status: DISCONTINUED | OUTPATIENT
Start: 2019-12-14 | End: 2019-12-18

## 2019-12-13 RX ORDER — FLUCONAZOLE 100 MG/1
200 TABLET ORAL EVERY EVENING
Status: DISCONTINUED | OUTPATIENT
Start: 2019-12-13 | End: 2019-12-19

## 2019-12-13 NOTE — ED PROVIDER NOTES
Patient Seen in: [facility] Emergency Department      History   Patient presents with:  Abdomen/Flank Pain    Stated Complaint: abd pain    25-year-old male who presents to the emergency room with a history of pancreatic cancer with known mets to hi PONV (postoperative nausea and vomiting)    • Presence of other cardiac implants and grafts    • Prostatitis    • Renal disorder    • Skin cancer    • Sputum production    • Stress    • Thyroid disease    • Type II or unspecified type diabetes mellitus wit abdominal pain. Skin: Negative. Hematological: Bruises/bleeds easily. Psychiatric/Behavioral: Negative. All other systems reviewed and are negative. Positive for stated complaint: abd pain  Other systems are as noted in HPI.   Constitutional BNP, lactic acid. Will get CT of the abdomen pelvis and chest x-ray.   Labs Reviewed   COMP METABOLIC PANEL (14) - Abnormal; Notable for the following components:       Result Value    Glucose 275 (*)     Sodium 131 (*)     Chloride 95 (*)     BUN 55 (*) the individual orders.    LACTIC ACID 3 HR POST POSITIVE   RAINBOW DRAW BLUE   RAINBOW DRAW GOLD   BLOOD CULTURE   BLOOD CULTURE   BODY FLUID CULT,AEROBIC AND ANAEROBIC     Patient was given a 30 mg/kg bolus of normal saline for the elevated lactic acid, IV the right hepatic lobe which is worse since 12/5/2019. 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   There is a trace amount of dependent ascites within the abdomen and pe 7:10 PM                   Disposition and Plan     Clinical Impression:  Leukocytosis, unspecified type  (primary encounter diagnosis)  NEMO (acute kidney injury) ([facility] [address]  Other ascites  Nosocomial pneumonia    Disposition:  Admit  12/13/2019  7:10 pm    Foll

## 2019-12-14 ENCOUNTER — APPOINTMENT (OUTPATIENT)
Dept: CT IMAGING | Facility: HOSPITAL | Age: 79
DRG: 871 | End: 2019-12-14
Attending: HOSPITALIST
Payer: MEDICARE

## 2019-12-14 ENCOUNTER — APPOINTMENT (OUTPATIENT)
Dept: ULTRASOUND IMAGING | Facility: HOSPITAL | Age: 79
DRG: 871 | End: 2019-12-14
Attending: EMERGENCY MEDICINE
Payer: MEDICARE

## 2019-12-14 PROCEDURE — 99223 1ST HOSP IP/OBS HIGH 75: CPT | Performed by: INTERNAL MEDICINE

## 2019-12-14 PROCEDURE — 99233 SBSQ HOSP IP/OBS HIGH 50: CPT | Performed by: HOSPITALIST

## 2019-12-14 PROCEDURE — 0F9030Z DRAINAGE OF LIVER WITH DRAINAGE DEVICE, PERCUTANEOUS APPROACH: ICD-10-PCS | Performed by: RADIOLOGY

## 2019-12-14 PROCEDURE — 76705 ECHO EXAM OF ABDOMEN: CPT | Performed by: EMERGENCY MEDICINE

## 2019-12-14 PROCEDURE — 49405 IMAGE CATH FLUID COLXN VISC: CPT | Performed by: HOSPITALIST

## 2019-12-14 NOTE — PROCEDURES
[facility]  Procedure Note    Bindu Gupta Patient Status:  Inpatient    11/3/1940 MRN [id number]   [facility] 4NW-A Attending Gaurav Sanchez MD   Hosp Day # 1 PCP Gayle Bower MD     Procedure: CT guided drain placement x 2

## 2019-12-14 NOTE — PROGRESS NOTES
[facility] Pharmacy Note:  Renal Adjustment for meropenem (MERREM)    Zehra Emery is a [de-identified]year old male who has been prescribed meropenem (MERREM) 500 mg every 8 hrs.   CrCl is estimated creatinine clearance is 31.5 mL/min (A) (based on SCr of 2.05 mg/dL (H)

## 2019-12-14 NOTE — H&P
[facility]  History and Physical     Gem Rise Patient Status:  Inpatient    11/3/1940 MRN [id number]   [facility] 4NW-A Attending Michelle Quick MD   Hosp Day # 0 PCP Rona Beasley MD     Chief Complaint: abd distention N/A 10/9/2018    Performed by Tim Perez MD at [address] [facility] (ERCP) N/A 8/9/2018    Performed by Tim Perez MD at [address] [facility] (ERCP) N/A 7 skin daily. , Disp: , Rfl:   Senna-Docusate Sodium 8.6-50 MG Oral Tab, Take 2 tablets by mouth daily. , Disp: , Rfl:   traMADol HCl 50 MG Oral Tab, Take 50 mg by mouth daily with breakfast., Disp: , Rfl:   HYDROcodone-acetaminophen 5-325 MG Oral Tab, Take 1 mouth daily. , Disp: 30 tablet, Rfl: 11  Venlafaxine HCl  MG Oral Capsule SR 24 Hr, Take 1 capsule (150 mg total) by mouth daily. , Disp: 30 capsule, Rfl: 5  tamsulosin HCl 0.4 MG Oral Cap, Take 1 capsule (0.4 mg total) by mouth daily. , Disp: 90 capsul Recent Labs   Lab 12/09/19  0544 12/10/19  0542 12/13/19  1732   * 74 275*   BUN 24* 23* 55*   CREATSERUM 1.40* 1.40* 2.05*   GFRAA 54* 54* 34*   GFRNAA 47* 47* 30*   CA 7.9* 8.1* 8.2*   ALB 2.4* 2.4* 2.1*    138 131*   K 3.9 3.4* 4.8

## 2019-12-14 NOTE — PROGRESS NOTES
[facility]  Progress Note     Kaitlynn Monk Patient Status:  Inpatient    11/3/1940 MRN [id number]   [facility] 4NW-A Attending Sotero Garrett MD   Hosp Day # 1 PCP Devora Leon MD     Chief Complaint: ascites   S:  Mild ab Intravenous Q12H   • pancrelipase (Lip-Prot-Amyl)  25,000 Units Oral TID CC   • fluconazole  200 mg Oral QPM   • Levothyroxine Sodium  100 mcg Oral Before breakfast   • melatonin  6 mg Oral Nightly   • nystatin  5 mL Oral QID   • Pantoprazole Sodium  40 mg

## 2019-12-14 NOTE — CONSULTS
[facility]                       Gastroenterology Consultation-[facility] Gastroenterology    Ida Vega Patient Status:  Inpatient    11/3/1940 MRN [id number]   [facility] 4NW-A Attending Zeb Eugene MD   [facility] swelling    • Nausea    • Osteoarthritis    • Pain in joints    • Personal history of antineoplastic chemotherapy 09/2018   • PONV (postoperative nausea and vomiting)    • Presence of other cardiac implants and grafts    • Prostatitis    • Renal disorder Once  [] 0.9% NaCl infusion, , Intravenous, Continuous  [] HYDROmorphone HCl (DILAUDID) 1 MG/ML injection 0.5 mg, 0.5 mg, Intravenous, Q30 Min PRN  sodium chloride 0.9% IV bolus 2,328 mL, 30 mL/kg (Ideal), Intravenous, Continuous  [COMPLETED] , Intravenous, Continuous  Heparin Sodium (Porcine) 5000 UNIT/ML injection 5,000 Units, 5,000 Units, Subcutaneous, 2 times per day  ondansetron HCl (ZOFRAN) injection 4 mg, 4 mg, Intravenous, Q6H PRN  insulin detemir (LEVEMIR) 100 UNIT/ML flextouch 25 Unit °C) (Oral)   Resp 18   Wt 246 lb 12.8 oz (111.9 kg)   SpO2 95%   BMI 33.47 kg/m²   Gen: AAO x 3, able to speak in complete sentences  HENT: NCAT, EOMI, PERRL, oropharynx is clear with moist mucosal membranes  Eyes: Sclerae are anicteric  Neck:  Supple with superior/anterior margin of the left hepatic lobe.   2. Infiltrating metastatic disease along the region of the falciform ligament is again seen and not significantly changed since 12/5/2019.  3. There is a trace amount of dependent ascites within the abdom

## 2019-12-14 NOTE — IMAGING NOTE
Pt  Down from nursing unit for drain placement x2 into liver. Dr Bladimir Green spoke with patient and informed consent obtained. Pt had eaten yogurt around noon so no sedation given, just local anesthetic. Therapeutic touch provided. .  Pre-procedure vitals stable.

## 2019-12-14 NOTE — PLAN OF CARE
2230 admitted to room 406 with increased abdominal pain ascites and dehydration. Navigator completed by admit nurse. Dr Faustine Krabbe on floor to see patient and orders received. He is to be NPO for paracentesis in am. Daughter at bedside until 2400.  Medicated

## 2019-12-14 NOTE — CONSULTS
[facility]    Report of Consultation    Crispin Lopez Patient Status:  Inpatient    11/3/1940 MRN [id number]   [facility] 4NW-A Attending Sandeep Juan MD   [address] Day # 1 PCP Morena Sagastume MD     Date of Admission:  2019 impairment     glasses   • Wears glasses      Past Surgical History:   Procedure Laterality Date   • COLONOSCOPY     • ENDOSCOPIC RETROGRADE CHOLANGIOPANCREATOGRAPHY (ERCP) N/A 11/25/2019    Performed by Kari Sanchez MD at [address] [place] [address] mg, Oral, QPM  •  HYDROcodone-acetaminophen (NORCO)  MG per tab 1 tablet, 1 tablet, Oral, Q4H PRN  •  Levothyroxine Sodium (SYNTHROID) tab 100 mcg, 100 mcg, Oral, Before breakfast  •  melatonin tab 6 mg, 6 mg, Oral, Nightly  •  nystatin (MYCOSTATIN) temperature source Oral, resp. rate 18, weight 111.9 kg (246 lb 12.8 oz), SpO2 91 %. General: Patient is alert and oriented x 3, not in acute distress.    Vital Signs: /59   Pulse 78   Temp 97.6 °F (36.4 °C) (Oral)   Resp 18   Wt 111.9 kg (246 lb 12 consolidation within the right middle lobe, right lower lobe, and left lower lobe. This may represent atelectasis with a superimposed infectious process not excluded.     Impression and Plan:    Patient Active Problem List:     Anxiety     Essential hypert

## 2019-12-14 NOTE — CONSULTS
INFECTIOUS DISEASE [facility] Patient Status:  Inpatient    11/3/1940 MRN [id number]   [facility] 4NW-A Attending Michael Abraham MD   [facility] Day # 1 PCP Niecy Walker 09/2018   • PONV (postoperative nausea and vomiting)    • Presence of other cardiac implants and grafts    • Prostatitis    • Renal disorder    • Skin cancer    • Sputum production    • Stress    • Thyroid disease    • Type II or unspecified type diabetes 0.9% IV bolus 2,328 mL, 30 mL/kg (Ideal), Intravenous, Continuous  •  Meropenem (MERREM) 500 mg in sodium chloride 0.9% 100 mL MBP, 500 mg, Intravenous, Q12H  •  bisacodyl (DULCOLAX) rectal suppository 10 mg, 10 mg, Rectal, Daily PRN  •  pancrelipase (Lip- (NOVOLOG) 100 UNIT/ML flexpen 1-68 Units, 1-68 Units, Subcutaneous, TID CC  •  Insulin Aspart Pen (NOVOLOG) 100 UNIT/ML flexpen 2-10 Units, 2-10 Units, Subcutaneous, TID CC and HS  No current facility-administered medications on file prior to encounter. mouth daily. Per Dr. Hanson Precise recommendations. , Disp: 30 tablet, Rfl: 0  Ciprofloxacin HCl 750 MG Oral Tab, Take 1 tablet (750 mg total) by mouth daily for 15 days.  (Patient taking differently: Take 500 mg by mouth 2 (two) times daily.  ), Disp: 15 tablet, Rfl auscultation bilaterally. No wheezes. No rhonchi. Cardiovascular: S1, S2.  Regular rate and rhythm. No murmurs. Abdomen: Soft, nontender, distended, flank dullness, tympanitic anteriorally  Musculoskeletal: Full range of motion of all extremities.   No unspecified vomiting type     Malignant neoplasm of pancreas, unspecified location of malignancy (HCC)     Elevated LFTs     Hepatic encephalopathy (HCC)     NEMO (acute kidney injury) ([address]     ATN (acute tubular necrosis) ([address]     Cholangitis     Periton

## 2019-12-14 NOTE — PLAN OF CARE
Admission navigator completed by admission RN. Patient settled in bed, and orientated to room. Report given to accepting RN. Was here for 20 days dc Monday to [employer]. Readmitted today.

## 2019-12-14 NOTE — ED PROVIDER NOTES
The patient's case was discussed with me by nurse practitioner Deangelo Reza. I interviewed and examined the patient who has a recent white blood cell count of 20,000 and increasing abdominal girth and pain secondary to reaccumulation of ascites.   The

## 2019-12-14 NOTE — H&P
[address] [facility],[address] Patient Status:  Inpatient    11/3/1940 MRN [id number]   [facility] 4NW-A Attending Tavo Mayo MD   Hosp Day # 1 PCP Nga Coker MD     Admitting Diagnosis:   Sepsis    Histo Performed by Paula Hewitt MD at [place] [address]. (EUS) N/A 10/9/2018    Performed by Paula Hewitt MD at [place] [address]. (EUS) N/A 7/27/2018    Performed by Paula Hewitt MD at [facility] ENDOSCOPY GFRAA 54* 34* 44*   GFRNAA 47* 30* 38*   CA 8.1* 8.2* 8.1*    131* 131*   K 3.4* 4.8 5.4*    95* 103   CO2 32.0 30.0 21.0       Assessment/Plan:   Impression: [de-identified] yo M sepsis, abd fluid collections    I have discussed with the patient and/or l

## 2019-12-14 NOTE — PLAN OF CARE
A/O. 3LNC 93-94%. Desat on RA. NSR on tele. SCDs. DQ heparin held this am due to pending paracentesis. Unable to do paracentesis due to loculated fluid collection and risk of knicking liver. Pt to IR for drains x2.  Medial abdomen drain #1 and right latera routine/schedule  - Consider collaborating with pharmacy to review patient's medication profile  Outcome: Not Progressing  Goal: Maintains adequate nutritional intake (undernourished)  Description  INTERVENTIONS:  - Monitor percentage of each meal consumed

## 2019-12-15 PROCEDURE — 99223 1ST HOSP IP/OBS HIGH 75: CPT | Performed by: INTERNAL MEDICINE

## 2019-12-15 PROCEDURE — 99232 SBSQ HOSP IP/OBS MODERATE 35: CPT | Performed by: INTERNAL MEDICINE

## 2019-12-15 PROCEDURE — 99233 SBSQ HOSP IP/OBS HIGH 50: CPT | Performed by: HOSPITALIST

## 2019-12-15 RX ORDER — SODIUM POLYSTYRENE SULFONATE 4.1 MEQ/G
30 POWDER, FOR SUSPENSION ORAL; RECTAL ONCE
Status: COMPLETED | OUTPATIENT
Start: 2019-12-15 | End: 2019-12-15

## 2019-12-15 NOTE — PROGRESS NOTES
Gastroenterology Progress Note  S: Feels much better today, no pain. Tolerating diet. Hasn't had a BM in 2 days  O: /59 (BP Location: Right arm)   Pulse 70   Temp 97.4 °F (36.3 °C) (Oral)   Resp 18   Wt 246 lb 12.8 oz (111.9 kg)   SpO2 96%   BMI 33. bilirubin in fluid c/w bile leak  -On Meropenem,Fluconazole, clinically better  -Repeat HIDA scan given elevated peritoneal bilirubin to r/o bile leak-plan of care discussed with daughter Neri Auguste MD    GI Addendum:    D/w Dr. Radha Olivares

## 2019-12-15 NOTE — CONSULTS
[facility]  Report of Consultation    Mirlande Patel Patient Status:  Inpatient    11/3/1940 MRN [id number]   [facility] 4NW-A Attending Naty Aguillon MD   Hosp Day # 2 PCP Hung Mejia MD     Reason for Consultation:  NEMO/CKD Performed by Hany Hannah MD at [address] [facility] (ERCP) N/A 10/9/2018    Performed by Hany Hannah MD at [address] [facility] (ERCP) N/A 8/9/2018    Perfo 500,000 Units, 5 mL, Oral, QID  •  Pantoprazole Sodium (PROTONIX) EC tab 40 mg, 40 mg, Oral, QAM AC  •  PEG 3350 (MIRALAX) powder packet 17 g, 17 g, Oral, Daily  •  Senna-Docusate Sodium (SENOKOT S) 8.6-50 MG tab 2 tablet, 2 tablet, Oral, Daily  •  Alfuzos JVD. No carotid bruits. Respiratory: Clear to auscultation bilaterally. No wheezes. No rhonchi. Cardiovascular: S1, S2.  Regular rate and rhythm. No murmurs. Equal pulses   Abdomen: Soft, nontender, + distended. Positive bowel sounds.  No rebound tende

## 2019-12-15 NOTE — PROGRESS NOTES
[facility]    Progress Note    Karely Waite Patient Status:  Inpatient    11/3/1940 MRN [id number]   [facility] 4NW-A Attending Larry Suero MD   [facility] Day # 2 PCP yKe Mcdaniel MD     Subjective:  Karely Waite is a(n) 8 Leukocytosis     Leukocytosis, unspecified type     Other ascites     Nosocomial pneumonia     Malignant ascites     Pancreatic cancer metastasized to liver (HCC)     Bacteremia    Bile leak: GI following. Likely not amenable to endoscopic intervention.  Wi

## 2019-12-15 NOTE — PROGRESS NOTES
[facility]  Progress Note      Kaitlynn Monk Patient Status:  Inpatient    11/3/1940 MRN [id number]   [facility] 4NW-A Attending Sotero Garrett MD   Hosp Day # 2 PCP Devora Leon MD       Subjective:   Significant improvement ov

## 2019-12-15 NOTE — PROGRESS NOTES
Alert and orientated x4. Ambulating to bathroom with standby assist.  Continent of urine. Bilary drains 1 and 2 flushed and aspirated. Draining bile. No requests for pain medications. Up in chair. Afebrile.

## 2019-12-15 NOTE — PLAN OF CARE
A&Ox4. VSS, afebrile. Patient reporting some tenderness around drain sites but no overt pain; generalized abdominal pain- PRN pain medication administered with moderate relief. Abdomen distended, hypoactive bowel sounds.  Right lateral and left midline bi

## 2019-12-15 NOTE — PROGRESS NOTES
[facility]                INFECTIOUS DISEASE PROGRESS NOTE    [facility] [place] Patient Status:  Inpatient    11/3/1940 MRN [id number]   [facility] 4SW-A Attending Angela Cardenas MD   Hosp Day # 2 PCP Jasmine Richardson MD     Antibioti Range    Aerobic Smear 4+ WBCs seen N/A    Aerobic Smear No organisms seen N/A   2.  BLOOD CULTURE     Status: None (Preliminary result)    Collection Time: 12/13/19  6:20 PM   Result Value Ref Range    Blood Culture Result No Growth 1 Day N/A     Blood Cul unspecified location of malignancy ([facility] [address]     Elevated LFTs     Hepatic encephalopathy (HCC)     NEMO (acute kidney injury) ([address]     ATN (acute tubular necrosis) (HCC)     Cholangitis     Peritonitis (HCC)     Anasarca     Anemia complicating neoplastic dis

## 2019-12-15 NOTE — PROGRESS NOTES
[facility]  Progress Note     Krystyna Ivy Patient Status:  Inpatient    11/3/1940 MRN [id number]   [facility] 4NW-A Attending Abdi Kendall MD   Hosp Day # 2 PCP Dolores Bartlett MD     Chief Complaint: abd fluid collection (Lip-Prot-Amyl)  25,000 Units Oral TID CC   • fluconazole  200 mg Oral QPM   • Levothyroxine Sodium  100 mcg Oral Before breakfast   • melatonin  6 mg Oral Nightly   • nystatin  5 mL Oral QID   • Pantoprazole Sodium  40 mg Oral QAM AC   • PEG 3350  17 g Or point Mr. Davis Prior is expected to be discharge to: ANNALISA Sharpe MD

## 2019-12-16 ENCOUNTER — APPOINTMENT (OUTPATIENT)
Dept: PHYSICAL THERAPY | Age: 79
End: 2019-12-16
Attending: FAMILY MEDICINE
Payer: MEDICARE

## 2019-12-16 PROBLEM — K83.9 BILE LEAK: Status: ACTIVE | Noted: 2019-12-16

## 2019-12-16 PROCEDURE — B54NZZA ULTRASONOGRAPHY OF LEFT UPPER EXTREMITY VEINS, GUIDANCE: ICD-10-PCS | Performed by: INTERNAL MEDICINE

## 2019-12-16 PROCEDURE — 05HA33Z INSERTION OF INFUSION DEVICE INTO LEFT BRACHIAL VEIN, PERCUTANEOUS APPROACH: ICD-10-PCS | Performed by: INTERNAL MEDICINE

## 2019-12-16 PROCEDURE — 99232 SBSQ HOSP IP/OBS MODERATE 35: CPT | Performed by: INTERNAL MEDICINE

## 2019-12-16 PROCEDURE — 99233 SBSQ HOSP IP/OBS HIGH 50: CPT | Performed by: HOSPITALIST

## 2019-12-16 RX ORDER — FUROSEMIDE 10 MG/ML
40 INJECTION INTRAMUSCULAR; INTRAVENOUS ONCE
Status: COMPLETED | OUTPATIENT
Start: 2019-12-16 | End: 2019-12-16

## 2019-12-16 RX ORDER — ALBUMIN (HUMAN) 12.5 G/50ML
25 SOLUTION INTRAVENOUS ONCE
Status: COMPLETED | OUTPATIENT
Start: 2019-12-16 | End: 2019-12-16

## 2019-12-16 RX ORDER — SODIUM CHLORIDE 0.9 % (FLUSH) 0.9 %
10 SYRINGE (ML) INJECTION EVERY 12 HOURS
Status: DISCONTINUED | OUTPATIENT
Start: 2019-12-16 | End: 2019-12-19

## 2019-12-16 NOTE — PROGRESS NOTES
Gastroenterology Progress Note  S: Feels much better today, had a BM yday . Drains with 300 cc OP over the past 24 hours.    O: /61 (BP Location: Right arm)   Pulse 70   Temp 98.3 °F (36.8 °C) (Oral)   Resp 18   Wt 246 lb 12.8 oz (111.9 kg)   SpO2 91% bilirubin in fluid c/w bile leak; d/w Dr. Vish Jaquez, who believes likely the leak is from [address], surg onc on board, d/w IR who may pursue retrograde contrast study through drains next week and consider PTC in future  -On Meropenem,Fluconazole    [employer]

## 2019-12-16 NOTE — CONSULTS
[facility]    Patient Name:  Denise Ardon   YOB: 1940   Gender:  Male   Appt Date:  12/16/2019   Provider:  Francisco Treadwell MD   Insurance:  MEDICARE PART A&B     Huong Clement MD He was initially diagnosed in July 2018 with localized disease and biliary obstruction requiring 1 stent placement. He did not tolerate chemotherapy well and received SBRT. He did well for some.  And subsequently was noted to have metastatic disease to th • Personal history of antineoplastic chemotherapy 09/2018   • PONV (postoperative nausea and vomiting)    • Presence of other cardiac implants and grafts    • Prostatitis    • Renal disorder    • Skin cancer    • Sputum production    • Stress    • Thyroid Abdomen: Soft mild generalized tenderness more significant on the right upper quadrant. Distended. There is no guarding or peritonitis. No masses. RUQ drain bilious. Musculoskeletal: Extremities:+edema. Skin: Inspection and palpation: no jaundice. performed up to a 10 Martiniquais self-retaining pigtail catheter. The pigtail was string fixed within the cavity. CT completion images confirmed successful placement of the percutaneous drain.   The catheter was secured to the skin with nonabsorbable suture   a -It is not clear where site of bile leak is from. Doubt GB (but possibility). Drains/fluid are superior to liver and not in region of GB.   -Options do include a percutaneous cholecystostomy.  -At this time, patient is doing well with percutaneous drains an

## 2019-12-16 NOTE — PLAN OF CARE
Pt alert and oriented x4, forgetful and drowsy at times. Pt denies SOB and VSS. On 2L NC, desating while asleep. Very poor appetite noted. Had only a few bites of breakfast. Novolog given per [facility] for elevated blood sugar and carb coverage. Had BM.  Drains i

## 2019-12-16 NOTE — CM/SW NOTE
Pt admitted for MJ. Per MD, the pt would like to return at [place]. SW consulted MJ to see if the pt could return.  Awaiting MJ eval.     Addendum: Referral sent via [facility] to [facility].

## 2019-12-16 NOTE — PROGRESS NOTES
[facility]  Progress Note     Alalma Donahue Patient Status:  Inpatient    11/3/1940 MRN [id number]   [facility] 4NW-A Attending Timi Haynes MD   Hosp Day # 3 PCP Tae Lamb MD     Chief Complaint: abd fluid collection meropenem  500 mg Intravenous Q12H   • pancrelipase (Lip-Prot-Amyl)  25,000 Units Oral TID CC   • fluconazole  200 mg Oral QPM   • Levothyroxine Sodium  100 mcg Oral Before breakfast   • melatonin  6 mg Oral Nightly   • nystatin  5 mL Oral QID   • Pantopra 1 per 5g carbs as previously on  3. Continue correctional scale  13. Thrush, nystatin  14. BPH, uroxatral  15. Constipation  1.  Just had BM now, continue Bowel care    Quality:  · DVT Prophylaxis: Heparin   · CODE status: Full    Estimated date of discharg

## 2019-12-16 NOTE — PROGRESS NOTES
[facility]                INFECTIOUS DISEASE PROGRESS NOTE    [place] Patient Status:  Inpatient    11/3/1940 MRN [id number]   [facility] 4SW-A Attending Angela Cardenas MD   Hosp Day # 3 PCP Jasmine Richardson MD     Antibioti Status: None (Preliminary result)    Collection Time: 12/13/19  6:20 PM   Result Value Ref Range    Blood Culture Result No Growth 2 Days N/A     Blood Culture Once [[id number]] (Abnormal) Collected: 12/13/19 1820   Specimen: Blood,peripheral Updated: 12/1 kidney injury) ([facility] [address]     ATN (acute tubular necrosis) (HCC)     Cholangitis     Peritonitis (HCC)     Anasarca     Anemia complicating neoplastic disease     Leukocytosis     Leukocytosis, unspecified type     Other ascites     Nosocomial pneumonia     Pan

## 2019-12-16 NOTE — PROGRESS NOTES
[facility]  Progress Note    Karely Waite Patient Status:  Inpatient    11/3/1940 MRN [id number]   [facility] 4NW-A Attending Larry Suero MD   Hosp Day # 3 PCP Kye Mcdaniel MD     No complains; abd pain improved  Denies f/c 4 mg, Intravenous, Q6H PRN  insulin detemir (LEVEMIR) 100 UNIT/ML flextouch 25 Units, 25 Units, Subcutaneous, Q12H  Insulin Aspart Pen (NOVOLOG) 100 UNIT/ML flexpen 1-68 Units, 1-68 Units, Subcutaneous, TID CC  Insulin Aspart Pen (NOVOLOG) 100 UNIT/ML flex subcapsular loculations; he is also has recent hx of candida SBP  - continue fluconozole; started on meropenem  - f/u cultures  3. Metastatic panc ca; malignant ascites; ? plan for paracentesis   4.  Severe hypoalbuminemia ; due to chronic disease/malignanc

## 2019-12-16 NOTE — PROGRESS NOTES
[facility]  Progress Note      Bindu Gupta Patient Status:  Inpatient    11/3/1940 MRN [id number]   [facility] 4NW-A Attending Gaurav Sanchez MD   [address] Day # 3 PCP Gayle Bower MD       [de-identified]year-old male with perihepatic fluid

## 2019-12-16 NOTE — PROGRESS NOTES
Heme/Onc Progress Note    Patient Name: Samia Rivera   YOB: 1940   Medical Record Number: [id number]   CSN: [id number]   Attending Physician: Magdy Navarrete M.D. Subjective:  Afebrile overnight.   Pain is a bit better than yesterday Glucose-Vitamin C (DEX-4) chewable tab 4 tablet, 4 tablet, Oral, Q15 Min PRN **OR** dextrose 50 % injection 50 mL, 50 mL, Intravenous, Q15 Min PRN **OR** glucose (DEX4) oral liquid 30 g, 30 g, Oral, Q15 Min PRN **OR** Glucose-Vitamin C (DEX-4) chewable tab POC Glucose 129 (H) 70 - 99 mg/dL   POCT GLUCOSE    Collection Time: 12/15/19  9:22 PM   Result Value Ref Range    POC Glucose 171 (H) 70 - 99 mg/dL   BASIC METABOLIC PANEL (8)    Collection Time: 12/16/19  5:50 AM   Result Value Ref Range    Glucose 25 Normal, Slide reviewed, see previous RBC morphology. Platelet Morphology Normal Normal       Radiology:  No new imaging. Impression and Plan:  Bile leak: GI following. Concern is possible leak from gallbladder. Surg onc to see.     Metastatic pancrea

## 2019-12-16 NOTE — DIETARY MALNUTRITION NOTE
[facility]    NUTRITION INITIAL ASSESSMENT    Pt meets moderate malnutrition criteria.     CRITERIA FOR MALNUTRITION DIAGNOSIS:  Criteria for non-severe malnutrition diagnosis: chronic illness related to energy intake less than75% for greater than 1 lb)  05/07/19 : 103 kg (227 lb)  04/19/19 : 105.2 kg (232 lb)  03/01/19 : 107.5 kg (237 lb)  01/04/19 : 105.7 kg (233 lb)    NUTRITION:  Diet: Carb Con   Oral Supplements: Ensure High Protein at breakfast and lunch, Ensure Clear at dinner    FOOD/NUTRITION

## 2019-12-16 NOTE — PLAN OF CARE
1930 received patient in handoff. Drowsy but arouseable. Daughter at bedside. Poor appetite continues. Drains x2 draining bile colored returns. Flushed and aspirated per orders.

## 2019-12-17 PROCEDURE — 99232 SBSQ HOSP IP/OBS MODERATE 35: CPT | Performed by: INTERNAL MEDICINE

## 2019-12-17 PROCEDURE — 99232 SBSQ HOSP IP/OBS MODERATE 35: CPT | Performed by: HOSPITALIST

## 2019-12-17 RX ORDER — FUROSEMIDE 10 MG/ML
40 INJECTION INTRAMUSCULAR; INTRAVENOUS ONCE
Status: DISCONTINUED | OUTPATIENT
Start: 2019-12-17 | End: 2019-12-17

## 2019-12-17 RX ORDER — FUROSEMIDE 10 MG/ML
40 INJECTION INTRAMUSCULAR; INTRAVENOUS
Status: COMPLETED | OUTPATIENT
Start: 2019-12-17 | End: 2019-12-17

## 2019-12-17 RX ORDER — ALBUMIN (HUMAN) 12.5 G/50ML
25 SOLUTION INTRAVENOUS ONCE
Status: COMPLETED | OUTPATIENT
Start: 2019-12-17 | End: 2019-12-17

## 2019-12-17 NOTE — PROGRESS NOTES
[facility]    Progress Note    Aletha Escalante Patient Status:  Inpatient    11/3/1940 MRN [id number]   [facility] 4NW-A Attending Tavo Mayo MD   [facility] Day # 4 PCP Nga Coker MD     Subjective:  Aletha Escalante is a [de-identified] yea Pager [phone]

## 2019-12-17 NOTE — CM/SW NOTE
KRIS spoke with Manjit Das from [facility] acute rehab. Patient is on the list to be evaluated by [facility] today. Order placed for physical therapy evaluation.     Jaquelin MICHAEL, 12/17/19, 10:36 AM

## 2019-12-17 NOTE — PROGRESS NOTES
[facility]                INFECTIOUS DISEASE PROGRESS NOTE    Chris Decent Patient Status:  Inpatient    11/3/1940 MRN [id number]   [facility] 4SW-A Attending Cecilia Morales MD   [facility] Day # 4 PCP Tisha Corado MD     Antibioti BACTERIAL CULTURE     Status: Abnormal    Collection Time: 12/14/19  2:52 PM   Result Value Ref Range    Aerobic Culture Result 3+ growth Candida albicans (A) N/A    Aerobic Smear 4+ WBCs seen N/A    Aerobic Smear No organisms seen N/A   3.  BLOOD CULTURE following  -continue Fluconazole  Dc meropenem resume ceftriaxone    Tamika Hernandez MD  [facility]  [phone]

## 2019-12-17 NOTE — PROGRESS NOTES
SUBJECTIVE:ER transfer from [place] [id number] for leucocytosis and abdominal distension    CC: Metastatic Pancreatic CA  Interval History:S/p biliary drain x2 on 12/14 by IR . Currently percutaneous drains to gravity.  No surgery planned  Scheduled Meds:  • furosemide  40 rhythm, Dorsalis pedis pulses normal bilat. ABDOMEN: normal Breath Sounds. + Distension  MUSCULOSKELETAL:normal Full ROM x 4 limbs; normal strength.         Lab Results   Component Value Date    WBC 13.3 12/17/2019    HGB 10.1 12/17/2019    HCT 31.2 12/17/

## 2019-12-17 NOTE — PHYSICAL THERAPY NOTE
PT attempted to see the pt for an eval this pm, however pt politely refusing even p educating on the benefits of mobility.  Pt reporting that when he moves he is in 7-8/10 pn throughout his abdomen and he just found a comfortable position and would like to

## 2019-12-17 NOTE — PLAN OF CARE
Pt alert and oriented x3-4, confused at times. OK with re-orientation. Pt denies SOB and VSS. C/o pain this AM, Norco given by night RN. Daughter at bedside. Updated on POC. Pt up in chair early this AM. Able to ambulate to bathroom with walker.  Poor appet

## 2019-12-17 NOTE — PROGRESS NOTES
Heme/Onc Progress Note    Patient Name: Andreia Wang   YOB: 1940   Medical Record Number: [id number]   CSN: [id number]   Attending Physician: Waleska Shabazz M.D. Subjective:  Drains working well. Pain diminished but still present. PRN  •  Venlafaxine HCl ER (EFFEXOR-XR) 24 hr cap 150 mg, 150 mg, Oral, Daily  •  traZODone HCl (DESYREL) tab 50 mg, 50 mg, Oral, Nightly PRN  •  glucose (DEX4) oral liquid 15 g, 15 g, Oral, Q15 Min PRN **OR** Glucose-Vitamin C (DEX-4) chewable tab 4 table GLUCOSE    Collection Time: 12/16/19  5:14 PM   Result Value Ref Range    POC Glucose 156 (H) 70 - 99 mg/dL   POCT GLUCOSE    Collection Time: 12/16/19  9:04 PM   Result Value Ref Range    POC Glucose 130 (H) 70 - 99 mg/dL   COMP METABOLIC PANEL (14)    Co 79.9 %    Lymphocyte % 7.2 %    Monocyte % 8.1 %    Eosinophil % 2.3 %    Basophil % 0.4 %    Immature Granulocyte % 2.1 %   POCT GLUCOSE    Collection Time: 12/17/19  6:12 AM   Result Value Ref Range    POC Glucose 182 (H) 70 - 99 mg/dL       Radiology:

## 2019-12-17 NOTE — PROGRESS NOTES
Name:Murphy Sepulveda  [facility]#:[id number]  :11/3/1940      Subjective:  SOB after going to the [facility]. Objective:  RUQ drain sites are CDI    Vital Signs:  Blood pressure 151/65, pulse 72, temperature 98.3 °F (36.8 °C), temperature source Oral, resp.  rate

## 2019-12-17 NOTE — PROGRESS NOTES
[facility]  Progress Note    Huong Coy Patient Status:  Inpatient    11/3/1940 MRN [id number]   [facility] 4NW-A Attending Shawn Park MD   Hosp Day # 4 PCP Colleen Steinberg MD     No acute events overnight  Feels better  No Subcutaneous, 2 times per day  ondansetron HCl (ZOFRAN) injection 4 mg, 4 mg, Intravenous, Q6H PRN  insulin detemir (LEVEMIR) 100 UNIT/ML flextouch 25 Units, 25 Units, Subcutaneous, Q12H  Insulin Aspart Pen (NOVOLOG) 100 UNIT/ML flexpen 1-68 Units, 1-68 Un recent hx of candida SBP  - continue fluconozole/meropenem per ID  3. Metastatic panc ca; malignant ascites   4. Severe hypoalbuminemia ; due to chronic disease/malignancy  5. Hyponatremia - resolved  6.  Hyperkalemia - mild; monitor; loop diuretics should

## 2019-12-17 NOTE — PLAN OF CARE
1915 assumed care. Daughter Rakesh Bowling at bedside. Pt in good spirits. Joking with RN and daughter. No c/o pain. Had just had Norco. Daughter does not want patient to have Tramadol. Feels it \" makes him Loopy\". Continues with poor appetite. Will drink soda.

## 2019-12-17 NOTE — PROGRESS NOTES
Gastroenterology Progress Note  S: Feels much better today . Drains with 600 cc OP over the past 24 hours.    O: /55 (BP Location: Right arm)   Pulse 69   Temp 98.2 °F (36.8 °C) (Oral)   Resp 18   Wt 234 lb (106.1 kg)   SpO2 94%   BMI 31.74 kg/m²   Ge c/w bile leak; d/w Dr. Yanna Pollard, who believes likely the leak is from [address], surg onc on board, d/w IR who may pursue retrograde contrast study through drains next week and consider PTC in future  - drain management per IR   -On Meropenem,Fluconazole    Abhis

## 2019-12-17 NOTE — PROGRESS NOTES
[facility]ITAL[facility]  Progress Note      Patient Status:  Inpatient    11/3/1940 MRN [id number]   [facility] 4NW-A Attending Malena Parra MD   Hosp Day # 4 PCP Robina Farmer MD     Chief Complaint: abd fluid collection Epic.  Medications:   • furosemide  40 mg Intravenous BID (Diuretic)   • Normal Saline Flush  10 mL Intravenous Q12H   • meropenem  500 mg Intravenous Q12H   • pancrelipase (Lip-Prot-Amyl)  25,000 Units Oral TID CC   • fluconazole  200 mg Oral QPM   • Levo previously on  3. correctional scale  13. Thrush, nystatin  14. BPH, uroxatral  15. Constipation  1. Bowel care    Quality:  · DVT Prophylaxis: Heparin   · CODE status: Full    Estimated date of discharge: 2 days? ? - depending on cultures/drain output - Anil Vela

## 2019-12-18 ENCOUNTER — APPOINTMENT (OUTPATIENT)
Dept: PHYSICAL THERAPY | Age: 79
End: 2019-12-18
Attending: FAMILY MEDICINE
Payer: MEDICARE

## 2019-12-18 PROCEDURE — 99232 SBSQ HOSP IP/OBS MODERATE 35: CPT | Performed by: INTERNAL MEDICINE

## 2019-12-18 RX ORDER — INSULIN LISPRO 100 [IU]/ML
INJECTION, SOLUTION INTRAVENOUS; SUBCUTANEOUS
Qty: 3 PEN | Refills: 3 | Status: SHIPPED
Start: 2019-12-18 | End: 2019-12-19

## 2019-12-18 RX ORDER — POTASSIUM CHLORIDE 20 MEQ/1
20 TABLET, EXTENDED RELEASE ORAL ONCE
Status: COMPLETED | OUTPATIENT
Start: 2019-12-18 | End: 2019-12-18

## 2019-12-18 RX ORDER — FUROSEMIDE 10 MG/ML
40 INJECTION INTRAMUSCULAR; INTRAVENOUS
Status: COMPLETED | OUTPATIENT
Start: 2019-12-18 | End: 2019-12-19

## 2019-12-18 RX ORDER — FLUCONAZOLE 200 MG/1
200 TABLET ORAL EVERY EVENING
Qty: 15 TABLET | Refills: 0 | Status: SHIPPED | OUTPATIENT
Start: 2019-12-18 | End: 2019-12-19

## 2019-12-18 RX ORDER — ALBUMIN (HUMAN) 12.5 G/50ML
25 SOLUTION INTRAVENOUS ONCE
Status: COMPLETED | OUTPATIENT
Start: 2019-12-18 | End: 2019-12-18

## 2019-12-18 NOTE — CM/SW NOTE
LIBIA followed up w/pt in regards to dc plan. Pt is not wanting [facility] or a [facility]. He confirmed he would like to dc home w/HH. Pt agreeable to [facility] at discharge and did not have an agency preference. Pt agreeable to a referral to [facility] care.  LIBIA paged [facility] w/

## 2019-12-18 NOTE — PROGRESS NOTES
Heme/Onc Progress Note    Patient Name: Jose Alfredo Le   YOB: 1940   Medical Record Number: [id number]   CSN: [id number]   Attending Physician: Rody Billingsley M.D. Subjective:  Abd pain is overall better.   Continues to drain bile - 6 **OR** Glucose-Vitamin C (DEX-4) chewable tab 4 tablet, 4 tablet, Oral, Q15 Min PRN **OR** dextrose 50 % injection 50 mL, 50 mL, Intravenous, Q15 Min PRN **OR** glucose (DEX4) oral liquid 30 g, 30 g, Oral, Q15 Min PRN **OR** Glucose-Vitamin C (DEX-4) chewa Time: 12/18/19  5:40 AM   Result Value Ref Range    Glucose 102 (H) 70 - 99 mg/dL    Sodium 139 136 - 145 mmol/L    Potassium 3.6 3.5 - 5.1 mmol/L    Chloride 101 98 - 112 mmol/L    CO2 31.0 21.0 - 32.0 mmol/L    Anion Gap 7 0 - 18 mmol/L    BUN 18 7 - 18

## 2019-12-18 NOTE — PHYSICAL THERAPY NOTE
PHYSICAL THERAPY EVALUATION - INPATIENT     Room Number: 406/406-A  Evaluation Date: 12/18/2019  Type of Evaluation: Initial  Physician Order: PT Eval and Treat    Presenting Problem: abdominal pain, elevated wbc  Reason for Therapy: Mobility Dysfunc Surgical History:   Procedure Laterality Date   • COLONOSCOPY     • ENDOSCOPIC RETROGRADE CHOLANGIOPANCREATOGRAPHY (ERCP) N/A 11/25/2019    Performed by Alice Srinivasan MD at [facility] ENDOSCOPY   • ENDOSCOPIC RETROGRADE CHOLANGIOPANCREATOGRAPHY (ERCP) N/A 10/ Commands:  follows multistep commands with increased time and follows multistep commands with repetition  · Safety Judgement:  decreased awareness of need for safety    RANGE OF MOTION AND STRENGTH ASSESSMENT  Upper extremity ROM and strength are see OT as david)  Stoop/Curb Assistance: Not tested  Comment : above per FIM    Skilled Therapy Provided: Patient received up in recliner with daughter present denies complaints. Transfers to stand to rw with min a for balance.   Gait trained with rw with cues for training  Rehab Potential : Good  Frequency (Obs): 5x/week  Number of Visits to Meet Established Goals: 4      CURRENT GOALS    Goal #1 Patient is able to demonstrate supine - sit EOB @ level: supervision     Goal #2 Patient is able to demonstrate transfer

## 2019-12-18 NOTE — PROGRESS NOTES
[facility]                INFECTIOUS DISEASE PROGRESS NOTE    [place] Patient Status:  Inpatient    11/3/1940 MRN [id number]   [facility] 4SW-A Attending Angela Cardenas MD   Hosp Day # 5 PCP Jasmine Richardson MD     Antibioti Result Value Ref Range    Anaerobic Culture Pending N/A   2.  AEROBIC BACTERIAL CULTURE     Status: Abnormal    Collection Time: 12/14/19  2:52 PM   Result Value Ref Range    Aerobic Culture Result 3+ growth Candida albicans (A) N/A    Aerobic Smear 4+ WB albicans  Previous episod of Klebsiella bacteremia  -SP IR drain  GI /IR following  -continue Fluconazole  ceftriaxone    Kassie Mora MD  [facility] [place] [facility]  [phone]

## 2019-12-18 NOTE — PLAN OF CARE
1915 received patient in handoff. Up in chair. Many family members at bedside. 2030 assisted back to bed. Poor appetite remains. Bites of yogurt for dinner. Taking  fluids but usually sodas. Inc of urine at noc.  Drains flushed and aspirated w/out resistanc

## 2019-12-18 NOTE — PROGRESS NOTES
[facility]PITAL[facility]  Progress Note     Cindy Hou Patient Status:  Inpatient    11/3/1940 MRN [id number]   [facility] 4NW-A Attending Dinh Maki MD   Hosp Day # 5 PCP Marychuy West MD     Chief Complaint: abd fluid collection mL Intravenous Q12H   • pancrelipase (Lip-Prot-Amyl)  25,000 Units Oral TID CC   • fluconazole  200 mg Oral QPM   • Levothyroxine Sodium  100 mcg Oral Before breakfast   • melatonin  6 mg Oral Nightly   • nystatin  5 mL Oral QID   • Pantoprazole Sodium  40 · CODE status: Full    Case d/w pt/dtr. Home tomorrow w/ drains. Has family/caregiver support. F/u on ID recs for abx. Jenni FOX  10:54 AM     Patient seen and examined independently, agree with above except as otherwise noted.     On Physi

## 2019-12-18 NOTE — PROGRESS NOTES
[facility]  Progress Note    Zehra Emery Patient Status:  Inpatient    11/3/1940 MRN [id number]   [facility] 4NW-A Attending Kelsy [place] MD   Hosp Day # 5 PCP Dara Tovar MD     No acute events overnight         CefTRIAXon Subcutaneous, Q12H  Insulin Aspart Pen (NOVOLOG) 100 UNIT/ML flexpen 1-68 Units, 1-68 Units, Subcutaneous, TID CC  Insulin Aspart Pen (NOVOLOG) 100 UNIT/ML flexpen 2-10 Units, 2-10 Units, Subcutaneous, TID CC and HS      Physical Exam:  Vital signs: Blood disease/malignancy  5. Hyponatremia - resolved       Thank you for allowing me to participate in this patient's care. Please feel free to call me with any questions or concerns.     Fady Schilling MD  12/18/2019

## 2019-12-18 NOTE — CDS QUERY
Impaired Nutritional Status  DOCUMENTATION CLARIFICATION FORM  Dear Doctor:  Clinical information suggests impaired nutritional status.  For accurate ICD-10-CM code assignment to reflect severity of illness and risk of mortality,  PLEASE “X” ALL DIAGNOSES T THIS FORM IS A PERMANENT PART OF THE MEDICAL RECORD

## 2019-12-18 NOTE — PLAN OF CARE
Pt is up in a chair, aaox4, ambulates with therapist, denies pain, drain tubes in place, has moderate bile-like drainage, abdomen slightly firm, nontender, on iv abtx, afebrile.    Problem: GASTROINTESTINAL - ADULT  Goal: Minimal or absence of nausea and vo and evaluate response  - Consider cultural and social influences on pain and pain management  - Manage/alleviate anxiety  - Utilize distraction and/or relaxation techniques  - Monitor for opioid side effects  - Notify MD/LIP if interventions unsuccessful o

## 2019-12-18 NOTE — OCCUPATIONAL THERAPY NOTE
OCCUPATIONAL THERAPY QUICK EVALUATION - INPATIENT    Room Number: 406/406-A  Evaluation Date: 12/18/2019     Type of Evaluation: Quick Eval  Presenting Problem: Acute kindey injury    Physician Order: IP Consult to Occupational Therapy  Reason for Therapy: Past Surgical History  Past Surgical History:   Procedure Laterality Date   • COLONOSCOPY     • ENDOSCOPIC RETROGRADE CHOLANGIOPANCREATOGRAPHY (ERCP) N/A 11/25/2019    Performed by Kari Sanchez MD at [address] RESTRICTION  Weight Bearing Restriction: None                PAIN ASSESSMENT  Ratin  Location: n/a       COGNITION  WFL    RANGE OF MOTION AND STRENGTH ASSESSMENT  Upper extremity ROM is within functional limits except for BHUMI shoulder flexion to appro Session: Up in chair;Needs met;Call light within reach;RN aware of session/findings; All patient questions and concerns addressed;SCDs in place; Family present    ASSESSMENT     Patient is a [de-identified]year old male admitted on 12/13/2019 for NEMO.  Complete medical h

## 2019-12-18 NOTE — DIETARY MALNUTRITION NOTE
[facility] ASSESSMENT    Pt meets moderate malnutrition criteria.     CRITERIA FOR MALNUTRITION DIAGNOSIS:  Criteria for non-severe malnutrition diagnosis: chronic illness related to energy intake less than75% for greater than wt based on wt history   12/16/19 : 106.1 kg (234 lb)  12/13/19  111.9 kg (246 lb 12.8 oz)   12/07/19 : 113.6 kg (250 lb 6.4 oz)  10/25/19 : 103 kg (227 lb)  08/13/19 : 104.3 kg (230 lb)  07/05/19 : 106.6 kg (235 lb)  05/07/19 : 103 kg (227 lb)  04/19/19 :

## 2019-12-18 NOTE — PROGRESS NOTES
Gastroenterology Progress Note  S: Feels well today. Pain controlled. Drains with 710 cc OP over the past 24 hours.    O: /64 (BP Location: Right arm)   Pulse 69   Temp 98 °F (36.7 °C) (Oral)   Resp 18   Wt 234 lb (106.1 kg)   SpO2 95%   BMI 31.74 kg drains in place   -High bilirubin in fluid c/w bile leak; d/w Dr. Tara Palafox, who believes likely the leak is from [address], surg onc on board, pt discussed in tumor board; per heme onc note, plan will be to continue with IR drains and reassess sx as OP; plan for

## 2019-12-18 NOTE — HOME CARE LIAISON
Received referral from [facility]. Met with patient at the bedside and provided choice. Patient is agreeable to [facility]. Residential brochure provided with contact information. All questions addressed and answered.

## 2019-12-19 VITALS
RESPIRATION RATE: 18 BRPM | TEMPERATURE: 98 F | DIASTOLIC BLOOD PRESSURE: 58 MMHG | BODY MASS INDEX: 32 KG/M2 | WEIGHT: 234 LBS | SYSTOLIC BLOOD PRESSURE: 118 MMHG | HEART RATE: 110 BPM | OXYGEN SATURATION: 76 %

## 2019-12-19 PROCEDURE — 99239 HOSP IP/OBS DSCHRG MGMT >30: CPT | Performed by: INTERNAL MEDICINE

## 2019-12-19 PROCEDURE — 99232 SBSQ HOSP IP/OBS MODERATE 35: CPT | Performed by: INTERNAL MEDICINE

## 2019-12-19 RX ORDER — FLUCONAZOLE 200 MG/1
200 TABLET ORAL EVERY EVENING
Qty: 14 TABLET | Refills: 0 | Status: ON HOLD | OUTPATIENT
Start: 2019-12-19 | End: 2019-12-26

## 2019-12-19 RX ORDER — TORSEMIDE 20 MG/1
40 TABLET ORAL DAILY
Qty: 60 TABLET | Refills: 2 | Status: ON HOLD | OUTPATIENT
Start: 2019-12-19 | End: 2019-12-28

## 2019-12-19 RX ORDER — TRAMADOL HYDROCHLORIDE 50 MG/1
50 TABLET ORAL EVERY 6 HOURS PRN
Qty: 20 TABLET | Refills: 0 | Status: ON HOLD | OUTPATIENT
Start: 2019-12-19 | End: 2019-12-26

## 2019-12-19 RX ORDER — LEVOFLOXACIN 500 MG/1
500 TABLET, FILM COATED ORAL DAILY
Qty: 7 TABLET | Refills: 0 | Status: SHIPPED | COMMUNITY
Start: 2019-12-19 | End: 2019-12-19

## 2019-12-19 RX ORDER — ACETAMINOPHEN 325 MG/1
650 TABLET ORAL EVERY 6 HOURS PRN
Status: DISCONTINUED | OUTPATIENT
Start: 2019-12-19 | End: 2019-12-19

## 2019-12-19 RX ORDER — INSULIN LISPRO 100 [IU]/ML
INJECTION, SOLUTION INTRAVENOUS; SUBCUTANEOUS
Qty: 5 PEN | Refills: 3 | Status: SHIPPED | OUTPATIENT
Start: 2019-12-19

## 2019-12-19 RX ORDER — SENNA AND DOCUSATE SODIUM 50; 8.6 MG/1; MG/1
2 TABLET, FILM COATED ORAL DAILY
Qty: 60 TABLET | Refills: 0 | Status: ON HOLD | OUTPATIENT
Start: 2019-12-19 | End: 2019-12-26

## 2019-12-19 RX ORDER — HYDROCODONE BITARTRATE AND ACETAMINOPHEN 5; 325 MG/1; MG/1
1-2 TABLET ORAL EVERY 4 HOURS PRN
Qty: 20 TABLET | Refills: 0 | Status: ON HOLD | OUTPATIENT
Start: 2019-12-19 | End: 2020-01-10

## 2019-12-19 RX ORDER — MAGNESIUM OXIDE 400 MG (241.3 MG MAGNESIUM) TABLET
10 TABLET NIGHTLY PRN
Status: ON HOLD | COMMUNITY
End: 2019-12-28

## 2019-12-19 RX ORDER — POLYETHYLENE GLYCOL 3350 17 G/17G
17 POWDER, FOR SOLUTION ORAL DAILY
Qty: 30 EACH | Refills: 0 | Status: ON HOLD | OUTPATIENT
Start: 2019-12-19 | End: 2019-12-26

## 2019-12-19 NOTE — CM/SW NOTE
12/19/19 1000   Discharge disposition   Expected discharge disposition Home or Self   Name of Facillity/Home Care/Hospice Residential   Discharge transportation Private car   Addendum: Informed [facility] the pt w/dc home today.

## 2019-12-19 NOTE — PROGRESS NOTES
Heme/Onc Progress Note    Patient Name: Avery Gutiérrez   YOB: 1940   Medical Record Number: [id number]   CSN: [id number]   Attending Physician: Kapil Lynn M.D. Subjective:  Feels better. Needing less pain med. No fever.   Biliar glucose (DEX4) oral liquid 15 g, 15 g, Oral, Q15 Min PRN **OR** Glucose-Vitamin C (DEX-4) chewable tab 4 tablet, 4 tablet, Oral, Q15 Min PRN **OR** dextrose 50 % injection 50 mL, 50 mL, Intravenous, Q15 Min PRN **OR** glucose (DEX4) oral liquid 30 g, 30 g, POC Glucose 199 (H) 70 - 99 mg/dL   POCT GLUCOSE    Collection Time: 12/18/19  8:58 PM   Result Value Ref Range    POC Glucose 159 (H) 70 - 99 mg/dL   BASIC METABOLIC PANEL (8)    Collection Time: 12/19/19  5:51 AM   Result Value Ref Range    Glucose 62 (L conference and no easy solution. Plan to leave drain in place for now and plan for discharge. Will need home health. Will need oral fluconazole per Dr Samantha Fields.   Completed meds for Klebsiella.      2) Metastatic pancreatic cancer: Treatment on hold.  Coul

## 2019-12-19 NOTE — PROGRESS NOTES
Name:Murphy Sepulveda  OYV#:[id number]  :11/3/1940      Subjective:  Less abdominal pain today. Objective: Both RUQ drain sites are CDI    Vital Signs:  Blood pressure 143/64, pulse 69, temperature 98 °F (36.7 °C), temperature source Oral, resp.  r

## 2019-12-19 NOTE — PROGRESS NOTES
[facility]  Progress Note    Bindu Gupta Patient Status:  Inpatient    11/3/1940 MRN [id number]   [facility] 4NW-A Attending Gaurav Sanchez MD   Hosp Day # 6 PCP Gayle Bower MD     No acute events overnight   Plan for dc to h 1-68 Units, Subcutaneous, TID CC  Insulin Aspart Pen (NOVOLOG) 100 UNIT/ML flexpen 2-10 Units, 2-10 Units, Subcutaneous, TID CC and HS      Physical Exam:  Vital signs: Blood pressure 118/58, pulse 75, temperature 98.1 °F (36.7 °C), resp.  rate 18, weight 2 me with any questions or concerns.     Devang Briones MD  12/19/2019

## 2019-12-19 NOTE — HOME CARE LIAISON
Paged Dr. Christopher Rodriguez, Interventional Radiology, for drainage maintenance/home health orders. Received call from Neyda Quiet, -- orders are for patient to drain both biliary drains when they are mostly fill and/or as needed. Record volumes.   Do not let them fill c

## 2019-12-19 NOTE — PROGRESS NOTES
Gastroenterology Progress Note  S: Feels well today. Pain controlled. Drains with 715 cc OP over the past 24 hours.    O: /58 (BP Location: Right arm)   Pulse 75   Temp 98.1 °F (36.7 °C)   Resp 18   Wt 234 lb (106.1 kg)   SpO2 93%   BMI 31.74 kg/m² endoscopy       Plan:   -IR drains in place   -High bilirubin in fluid c/w bile leak; d/w Dr. Nadeem Alfaro, who believes likely the leak is from [address], surg onc on board, pt discussed in tumor board; per heme onc note, plan will be to continue with IR drains and

## 2019-12-19 NOTE — PHYSICAL THERAPY NOTE
PHYSICAL THERAPY TREATMENT NOTE - INPATIENT    Room Number: 406/406-A     Session: 1   Number of Visits to Meet Established Goals: 4    Presenting Problem: abdominal pain, elevated wbc    Problem List  Principal Problem:    [facility] (ER[facility]) N/A 8/9/2018    Performed by Alfred Muse MD at [address] [facility] (ERCP) N/A 7/27/2018    Performed by Alfred Muse MD at [address] [place] [address] (EUS) N/A 10/9/2018    Perform Mariola(Pt has stairlift at home)       AM-PAC Score:  Raw Score: 18   Approx Degree of Impairment: 46.58%   Standardized Score (AM-PAC Scale): 43.63   CMS Modifier (G-Code): CK    FUNCTIONAL ABILITY STATUS  Gait Assessment   Gait Assistance: Supervision  D is a decline in mobility status. DISCHARGE RECOMMENDATIONS  PT Discharge Recommendations: 24 hour care/supervision;Home with home health PT     PLAN  PT Treatment Plan: Bed mobility; Endurance; Energy conservation;Patient education; Family education;Gai

## 2019-12-19 NOTE — DISCHARGE SUMMARY
[facility] [place] HOSPITALIST  DISCHARGE SUMMARY     Krystyna Ivy Patient Status:  Inpatient    11/3/1940 MRN [id number]   [facility] 4NW-A Attending Enio Valladares, DO   Hosp Day # 6 PCP Dolores Bartlett MD     Date of Admission: 2019  Date o pain/distention. He underwent drain placement per IR for Bile leak. He continued on broad-spectrum antibiotics, antifungals per ID. Ongoing output from drain drains. Peritoneal fluid +candida again.  Patient completed treatment for recent klebsiella parris 1 unit of insulin for every 5 grams of carbohydrates eaten Inject within 10 minutes before or after a meal,   Quantity:  5 pen  Refills:  3     torsemide 20 MG Tabs  Commonly known as:  DEMADEX      Take 2 tablets (40 mg total) by mouth daily.    Stop amyin Place 10 mg rectally daily as needed. Refills:  0     CALCIUM-VITAMIN D OR      Take 1 tablet by mouth daily. Refills:  0     CREON 12856-20463 units Cpep  Generic drug:  Pancrelipase (Lip-Prot-Amyl)      3 capsules.  Creon 6,000-19,000-30,000 3 capsule Ciprofloxacin HCl 750 MG Tabs  Commonly known as:  CIPRO        Insulin Aspart Pen 100 UNIT/ML Sopn  Commonly known as:  Nereida Favorite              Where to Get Your Medications      These medications were sent to [place] [address] [address] [facility], [address] [facility]

## 2019-12-19 NOTE — PROGRESS NOTES
[facility]PITAL[facility]  Progress Note     Vickyeladio Lui Patient Status:  Inpatient    11/3/1940 MRN [id number]   [facility] 4NW-A Attending Darien Goodrich MD   [facility] Day # 6 PCP Saqib Mccord MD     Chief Complaint: abd fluid collection Imaging data reviewed in Epic.   Medications:   • furosemide  40 mg Intravenous BID (Diuretic)   • cefTRIAXone  1 g Intravenous Q24H   • Normal Saline Flush  10 mL Intravenous Q12H   • pancrelipase (Lip-Prot-Amyl)  25,000 Units Oral TID CC   • fluconazole intake  2. levemir reduced to 16 units BID  3. nutritional scale - 1 per 5g carbs as previously on  4. correctional scale  13. Thrush, nystatin  14. BPH, uroxatral  15. Constipation  1. Bowel care    Quality:  · DVT Prophylaxis: Heparin   · CODE status:  Fu

## 2019-12-19 NOTE — PLAN OF CARE
Pt. A&O x4. VSS. Pt. Working with PT in the AM; tolerating well. Pt. With biliary drains in place with minimal bile colored drainage. Daughters at the bedside. Orders for patient to discharge home with home health.  Daughter and patient given discharge goal  Description  INTERVENTIONS:  - Encourage pt to monitor pain and request assistance  - Assess pain using appropriate pain scale  - Administer analgesics based on type and severity of pain and evaluate response  - Implement non-pharmacological measures

## 2019-12-19 NOTE — PROGRESS NOTES
[facility]                INFECTIOUS DISEASE PROGRESS NOTE    Taj Rodriguez Patient Status:  Inpatient    11/3/1940 MRN [id number]   [facility] 4SW-A Attending Marc Fu MD   [facility] Day # 6 PCP Dona Alexander MD     Antibioti 1.4  --   --    TP 6.0* 5.4* 5.9*  --   --          No results found for: [facility] Encounter on 12/13/19   1.  ANAEROBIC CULTURE     Status: None    Collection Time: 12/14/19  2:53 PM   Result Value Ref Range    Anaerobic Culture No A kidney disease (HCC)     Bile leak     Edema      ASSESSMENT/PLAN:  1.  Metastatic pancreatic CA, with obstructed stent, sp exchange  Intra-abdominal infection/bile/ multiple cx with Candida albicans  Previous episod of Klebsiella bacteremia  -SP IR drain

## 2019-12-19 NOTE — PROGRESS NOTES
Patient is alert and oriented, pleasantly conversant, anticipating discharge to home tomorrow. Had a dose of norco early this shift for abd. Pain with good relief. Afebrile. Biliary drains x2, bile looking output , drain #2 has more output than drain # 1.

## 2019-12-20 ENCOUNTER — APPOINTMENT (OUTPATIENT)
Dept: HEMATOLOGY/ONCOLOGY | Facility: HOSPITAL | Age: 79
End: 2019-12-20
Attending: INTERNAL MEDICINE
Payer: MEDICARE

## 2019-12-23 ENCOUNTER — TELEPHONE (OUTPATIENT)
Dept: HEMATOLOGY/ONCOLOGY | Facility: HOSPITAL | Age: 79
End: 2019-12-23

## 2019-12-23 ENCOUNTER — TELEPHONE (OUTPATIENT)
Dept: CASE MANAGEMENT | Age: 79
End: 2019-12-23

## 2019-12-23 DIAGNOSIS — K83.9 BILE LEAK: Primary | ICD-10-CM

## 2019-12-23 RX ORDER — 0.9 % SODIUM CHLORIDE 0.9 %
10 VIAL (ML) INJECTION DAILY
Qty: 40 SYRINGE | Refills: 0 | Status: SHIPPED | OUTPATIENT
Start: 2019-12-23 | End: 2020-01-30

## 2019-12-23 NOTE — TELEPHONE ENCOUNTER
Home health need order for saline flush patient has two drains and no order for how often they need to be flushed. Need instructions write prescription for more flushes.

## 2019-12-26 ENCOUNTER — APPOINTMENT (OUTPATIENT)
Dept: GENERAL RADIOLOGY | Facility: HOSPITAL | Age: 79
DRG: 853 | End: 2019-12-26
Attending: EMERGENCY MEDICINE
Payer: MEDICARE

## 2019-12-26 ENCOUNTER — HOSPITAL ENCOUNTER (INPATIENT)
Facility: HOSPITAL | Age: 79
LOS: 2 days | Discharge: HOME HEALTH | DRG: 853 | End: 2019-12-28
Attending: EMERGENCY MEDICINE | Admitting: HOSPITALIST
Payer: MEDICARE

## 2019-12-26 DIAGNOSIS — A41.9 SEPSIS DUE TO PNEUMONIA (HCC): Primary | ICD-10-CM

## 2019-12-26 DIAGNOSIS — J18.9 SEPSIS DUE TO PNEUMONIA (HCC): Primary | ICD-10-CM

## 2019-12-26 LAB
ADENOVIRUS PCR:: NEGATIVE
ALBUMIN SERPL-MCNC: 2 G/DL (ref 3.4–5)
ALBUMIN/GLOB SERPL: 0.4 {RATIO} (ref 1–2)
ALP LIVER SERPL-CCNC: 124 U/L (ref 45–117)
ALT SERPL-CCNC: 12 U/L (ref 16–61)
ANION GAP SERPL CALC-SCNC: 13 MMOL/L (ref 0–18)
APTT PPP: 37 SECONDS (ref 25.4–36.1)
AST SERPL-CCNC: 16 U/L (ref 15–37)
B PERT DNA SPEC QL NAA+PROBE: NEGATIVE
BASOPHILS # BLD AUTO: 0.04 X10(3) UL (ref 0–0.2)
BASOPHILS NFR BLD AUTO: 0.3 %
BILIRUB SERPL-MCNC: 1.4 MG/DL (ref 0.1–2)
BUN BLD-MCNC: 34 MG/DL (ref 7–18)
BUN/CREAT SERPL: 17.4 (ref 10–20)
C PNEUM DNA SPEC QL NAA+PROBE: NEGATIVE
CALCIUM BLD-MCNC: 8.1 MG/DL (ref 8.5–10.1)
CHLORIDE SERPL-SCNC: 90 MMOL/L (ref 98–112)
CO2 SERPL-SCNC: 30 MMOL/L (ref 21–32)
CORONAVIRUS 229E PCR:: NEGATIVE
CORONAVIRUS HKU1 PCR:: NEGATIVE
CORONAVIRUS NL63 PCR:: NEGATIVE
CORONAVIRUS OC43 PCR:: NEGATIVE
CREAT BLD-MCNC: 1.95 MG/DL (ref 0.7–1.3)
DEPRECATED RDW RBC AUTO: 56.9 FL (ref 35.1–46.3)
EOSINOPHIL # BLD AUTO: 0.03 X10(3) UL (ref 0–0.7)
EOSINOPHIL NFR BLD AUTO: 0.2 %
ERYTHROCYTE [DISTWIDTH] IN BLOOD BY AUTOMATED COUNT: 16.6 % (ref 11–15)
FLUAV RNA SPEC QL NAA+PROBE: NEGATIVE
FLUBV RNA SPEC QL NAA+PROBE: NEGATIVE
GLOBULIN PLAS-MCNC: 5.3 G/DL (ref 2.8–4.4)
GLUCOSE BLD-MCNC: 141 MG/DL (ref 70–99)
GLUCOSE BLD-MCNC: 318 MG/DL (ref 70–99)
GLUCOSE BLD-MCNC: 371 MG/DL (ref 70–99)
GLUCOSE BLD-MCNC: 423 MG/DL (ref 70–99)
HCT VFR BLD AUTO: 31.3 % (ref 39–53)
HGB BLD-MCNC: 10 G/DL (ref 13–17.5)
IMM GRANULOCYTES # BLD AUTO: 0.09 X10(3) UL (ref 0–1)
IMM GRANULOCYTES NFR BLD: 0.7 %
INR BLD: 1.35 (ref 0.9–1.1)
LACTATE SERPL-SCNC: 2.6 MMOL/L (ref 0.4–2)
LACTATE SERPL-SCNC: 3.1 MMOL/L (ref 0.4–2)
LACTATE SERPL-SCNC: 8.4 MMOL/L (ref 0.4–2)
LYMPHOCYTES # BLD AUTO: 0.65 X10(3) UL (ref 1–4)
LYMPHOCYTES NFR BLD AUTO: 5.1 %
M PROTEIN MFR SERPL ELPH: 7.3 G/DL (ref 6.4–8.2)
MCH RBC QN AUTO: 30.8 PG (ref 26–34)
MCHC RBC AUTO-ENTMCNC: 31.9 G/DL (ref 31–37)
MCV RBC AUTO: 96.3 FL (ref 80–100)
METAPNEUMOVIRUS PCR:: NEGATIVE
MONOCYTES # BLD AUTO: 0.94 X10(3) UL (ref 0.1–1)
MONOCYTES NFR BLD AUTO: 7.4 %
MYCOPLASMA PNEUMONIA PCR:: NEGATIVE
NEUTROPHILS # BLD AUTO: 10.97 X10 (3) UL (ref 1.5–7.7)
NEUTROPHILS # BLD AUTO: 10.97 X10(3) UL (ref 1.5–7.7)
NEUTROPHILS NFR BLD AUTO: 86.3 %
OSMOLALITY SERPL CALC.SUM OF ELEC: 299 MOSM/KG (ref 275–295)
PARAINFLUENZA 1 PCR:: NEGATIVE
PARAINFLUENZA 2 PCR:: NEGATIVE
PARAINFLUENZA 3 PCR:: NEGATIVE
PARAINFLUENZA 4 PCR:: NEGATIVE
PLATELET # BLD AUTO: 246 10(3)UL (ref 150–450)
POTASSIUM SERPL-SCNC: 3.6 MMOL/L (ref 3.5–5.1)
PROCALCITONIN SERPL-MCNC: 0.77 NG/ML
PSA SERPL DL<=0.01 NG/ML-MCNC: 17.4 SECONDS (ref 12.5–14.7)
RBC # BLD AUTO: 3.25 X10(6)UL (ref 3.8–5.8)
RHINOVIRUS/ENTERO PCR:: NEGATIVE
RSV RNA SPEC QL NAA+PROBE: NEGATIVE
SODIUM SERPL-SCNC: 133 MMOL/L (ref 136–145)
WBC # BLD AUTO: 12.7 X10(3) UL (ref 4–11)

## 2019-12-26 PROCEDURE — 99223 1ST HOSP IP/OBS HIGH 75: CPT | Performed by: HOSPITALIST

## 2019-12-26 PROCEDURE — 71045 X-RAY EXAM CHEST 1 VIEW: CPT | Performed by: EMERGENCY MEDICINE

## 2019-12-26 RX ORDER — METOCLOPRAMIDE HYDROCHLORIDE 5 MG/ML
5 INJECTION INTRAMUSCULAR; INTRAVENOUS EVERY 8 HOURS PRN
Status: DISCONTINUED | OUTPATIENT
Start: 2019-12-26 | End: 2019-12-28

## 2019-12-26 RX ORDER — ENOXAPARIN SODIUM 100 MG/ML
40 INJECTION SUBCUTANEOUS DAILY
Status: DISCONTINUED | OUTPATIENT
Start: 2019-12-26 | End: 2019-12-26

## 2019-12-26 RX ORDER — POLYETHYLENE GLYCOL 3350 17 G/17G
17 POWDER, FOR SOLUTION ORAL DAILY PRN
Qty: 30 EACH | Refills: 0 | Status: SHIPPED | COMMUNITY
Start: 2019-12-26 | End: 2020-02-18 | Stop reason: ALTCHOICE

## 2019-12-26 RX ORDER — SODIUM CHLORIDE 9 MG/ML
INJECTION, SOLUTION INTRAVENOUS CONTINUOUS
Status: DISCONTINUED | OUTPATIENT
Start: 2019-12-26 | End: 2019-12-28

## 2019-12-26 RX ORDER — DIAPER,BRIEF,INFANT-TODD,DISP
1 EACH MISCELLANEOUS 2 TIMES DAILY
Status: DISCONTINUED | OUTPATIENT
Start: 2019-12-26 | End: 2019-12-28

## 2019-12-26 RX ORDER — SODIUM CHLORIDE 9 MG/ML
125 INJECTION, SOLUTION INTRAVENOUS CONTINUOUS
Status: DISCONTINUED | OUTPATIENT
Start: 2019-12-26 | End: 2019-12-26

## 2019-12-26 RX ORDER — VENLAFAXINE HYDROCHLORIDE 75 MG/1
150 CAPSULE, EXTENDED RELEASE ORAL DAILY
Status: DISCONTINUED | OUTPATIENT
Start: 2019-12-26 | End: 2019-12-28

## 2019-12-26 RX ORDER — ALFUZOSIN HYDROCHLORIDE 10 MG/1
10 TABLET, EXTENDED RELEASE ORAL
Status: DISCONTINUED | OUTPATIENT
Start: 2019-12-27 | End: 2019-12-28

## 2019-12-26 RX ORDER — HYDROCODONE BITARTRATE AND ACETAMINOPHEN 5; 325 MG/1; MG/1
1-2 TABLET ORAL EVERY 4 HOURS PRN
Status: DISCONTINUED | OUTPATIENT
Start: 2019-12-26 | End: 2019-12-28

## 2019-12-26 RX ORDER — LEVOTHYROXINE SODIUM 0.1 MG/1
100 TABLET ORAL
Status: DISCONTINUED | OUTPATIENT
Start: 2019-12-26 | End: 2019-12-28

## 2019-12-26 RX ORDER — MAGNESIUM OXIDE 400 MG (241.3 MG MAGNESIUM) TABLET
3 TABLET NIGHTLY PRN
Status: DISCONTINUED | OUTPATIENT
Start: 2019-12-26 | End: 2019-12-28

## 2019-12-26 RX ORDER — FLUCONAZOLE 100 MG/1
200 TABLET ORAL EVERY EVENING
Status: DISCONTINUED | OUTPATIENT
Start: 2019-12-26 | End: 2019-12-28

## 2019-12-26 RX ORDER — DEXTROSE MONOHYDRATE 25 G/50ML
50 INJECTION, SOLUTION INTRAVENOUS
Status: DISCONTINUED | OUTPATIENT
Start: 2019-12-26 | End: 2019-12-28

## 2019-12-26 RX ORDER — ACETAMINOPHEN 325 MG/1
650 TABLET ORAL
Status: DISCONTINUED | OUTPATIENT
Start: 2019-12-26 | End: 2019-12-28

## 2019-12-26 RX ORDER — ONDANSETRON 2 MG/ML
4 INJECTION INTRAMUSCULAR; INTRAVENOUS EVERY 6 HOURS PRN
Status: DISCONTINUED | OUTPATIENT
Start: 2019-12-26 | End: 2019-12-28

## 2019-12-26 RX ORDER — IPRATROPIUM BROMIDE AND ALBUTEROL SULFATE 2.5; .5 MG/3ML; MG/3ML
3 SOLUTION RESPIRATORY (INHALATION) EVERY 4 HOURS PRN
Status: DISCONTINUED | OUTPATIENT
Start: 2019-12-26 | End: 2019-12-28

## 2019-12-26 RX ORDER — INSULIN LISPRO 100 [IU]/ML
INJECTION, SOLUTION INTRAVENOUS; SUBCUTANEOUS
Status: DISCONTINUED | OUTPATIENT
Start: 2019-12-26 | End: 2019-12-26

## 2019-12-26 RX ORDER — FLUCONAZOLE 200 MG/1
200 TABLET ORAL EVERY EVENING
Qty: 7 TABLET | Refills: 0 | Status: SHIPPED | COMMUNITY
Start: 2019-12-26 | End: 2019-12-27

## 2019-12-26 RX ORDER — POLYETHYLENE GLYCOL 3350 17 G/17G
17 POWDER, FOR SOLUTION ORAL DAILY PRN
Status: DISCONTINUED | OUTPATIENT
Start: 2019-12-26 | End: 2019-12-28

## 2019-12-26 RX ORDER — PANTOPRAZOLE SODIUM 40 MG/1
40 TABLET, DELAYED RELEASE ORAL
Status: DISCONTINUED | OUTPATIENT
Start: 2019-12-26 | End: 2019-12-28

## 2019-12-26 RX ORDER — HEPARIN SODIUM 5000 [USP'U]/ML
5000 INJECTION, SOLUTION INTRAVENOUS; SUBCUTANEOUS EVERY 12 HOURS SCHEDULED
Status: DISCONTINUED | OUTPATIENT
Start: 2019-12-26 | End: 2019-12-28

## 2019-12-26 RX ORDER — SENNA AND DOCUSATE SODIUM 50; 8.6 MG/1; MG/1
1 TABLET, FILM COATED ORAL DAILY PRN
Qty: 60 TABLET | Refills: 0 | Status: SHIPPED | COMMUNITY
Start: 2019-12-26 | End: 2020-01-30

## 2019-12-26 NOTE — CONSULTS
INFECTIOUS DISEASE [facility] [facility] Patient Status:  Inpatient    11/3/1940 MRN [id number]   [facility] 4NW-A Attending Irasema Alexander MD   [facility] Day # 0 MATTHEW Bal antineoplastic chemotherapy 09/2018   • PONV (postoperative nausea and vomiting)    • Presence of other cardiac implants and grafts    • Prostatitis    • Renal disorder    • Skin cancer    • Sputum production    • Stress    • Thyroid disease    • Type II o Medications:   •  0.9% NaCl infusion, 125 mL/hr, Intravenous, Continuous  •  sodium chloride 0.9% IV bolus 3,129 mL, 30 mL/kg, Intravenous, Once  •  acetaminophen (TYLENOL) tab 650 mg, 650 mg, Oral, Daily with lunch  •  fluconazole (DIFLUCAN) tab 200 mg, 2 (REGLAN) injection 10 mg, 10 mg, Intravenous, Q8H PRN  •  ipratropium-albuterol (DUONEB) nebulizer solution 3 mL, 3 mL, Nebulization, Q4H PRN  No current facility-administered medications on file prior to encounter.    Sodium Chloride 0.9 % Injection Soluti hydrocortisone 1 % External Cream, Apply 1 Application topically 2 (two) times daily. , Disp: , Rfl:   lidocaine 4 % External Patch, Place 1 patch onto the skin daily. , Disp: , Rfl:   acetaminophen 325 MG Oral Tab, Take 325 mg by mouth every 4 (four) hour 12.7*   .0       Recent Labs   Lab 12/26/19  0859   *   BUN 34*   CREATSERUM 1.95*   GFRAA 36*   GFRNAA 32*   CA 8.1*   ALB 2.0*   *   K 3.6   CL 90*   CO2 30.0   ALKPHO 124*   AST 16   ALT 12*   BILT 1.4   TP 7.3         Lab Results have low grade fever  Blood cx sent in ED, continue zosyn pending micro updates        Quique Richards MD  [facility]  [phone]

## 2019-12-26 NOTE — CONSULTS
Hem/Onc Report of Consultation    Patient Name: Crispin Lopez   YOB: 1940   Medical Record Number: [id number]   CSN: [id number]   Referring Provider(s): Dr. Dinesh Santoyo  Date of Consultation: 12/26/2019     Reason for Consultation: fever, meta Arthritis    • Back pain    • Bad breath    • Bloating    • Calculus of kidney    • Cancer ([facility] [address] 2018    pancreatic   • Cataracts, bilateral    • Diarrhea, unspecified    • Disorder of thyroid    • Dizziness    • Eye disease    • Fatigue    • Fever    • Fl Yoselin Augustine MD at [facility] MAIN OR   • OTHER SURGICAL HISTORY  10 years ago    cystoscopy   • SKIN SURGERY     • TONSILLECTOMY     • TOTAL HIP REPLACEMENT         Family Medical History:  Family History   Problem Relation Age of Onset   • Diabetes Father Medications:  0.9% NaCl infusion, 125 mL/hr, Intravenous, Continuous  sodium chloride 0.9% IV bolus 3,129 mL, 30 mL/kg, Intravenous, Once  [COMPLETED] Piperacillin Sod-Tazobactam So (ZOSYN) 4.5 g in dextrose 5 % 100 mL MBP/ADD-vantage, 4.5 g, Intravenous, Phosphate Dibasic 40 mmol infusion  [COMPLETED] Magnesium Sulfate IVPB premix SOLN 2 g  [COMPLETED] potassium chloride IVPB premix 40 mEq  [COMPLETED] 0.9% NaCl infusion  [COMPLETED] 0.9% NaCl infusion  [COMPLETED] sodium chloride 0.9% IV bolus 2,318 mL  [ capsule by mouth 3 (three) times daily with meals. melatonin 1 MG Oral Tab, Take 6 mg by mouth nightly as needed (for sleep).     insulin glargine (LANTUS SOLOSTAR) 100 UNIT/ML Subcutaneous Solution Pen-injector, Inject 16 Units into the skin 2 (two) times non tender, two biliary drains   Extremities: Trace edema BLE  Neurological: Grossly intact.    Psych/Depression: mood and affect are appropriate    Labs:  Recent Results (from the past 24 hour(s))   COMP METABOLIC PANEL (14)    Collection Time: 12/26/19  8 Neutrophil % 86.3 %    Lymphocyte % 5.1 %    Monocyte % 7.4 %    Eosinophil % 0.2 %    Basophil % 0.3 %    Immature Granulocyte % 0.7 %   RAINBOW DRAW BLUE    Collection Time: 12/26/19  9:00 AM   Result Value Ref Range    Hold Blue Auto Resulted    RAINBOW stable with no abdominal pain or distention. Metastatic Pancreatic cancer: Treatment on hold due to recent infectious issues. Anemia: Chronic illness. No transfusion required at this time.     SBP: finished abx course, remains on fluconazole for 1 midline drain. Cloudier bilious material in the bag for the R lateral drain. Extr: No C/C/E  Neuro: Grossly intact.      Recent Labs     12/26/19  0859   RBC 3.25 L   HGB 10.0 L   HCT 31.3 L   MCV 96.3   MCH 30.8   MCHC 31.9   RDW 16.6 H   NEPRELIM 10.97

## 2019-12-26 NOTE — HOME CARE LIAISON
Received orders for 3 in 1 commode at home. Referral sent to [facility] - pending response.

## 2019-12-26 NOTE — PLAN OF CARE
Pt received alert and oriented x4 from ED. Pt denies SOB and VSS. Sating in 90s on 3L NC. BS was 423, MD aware. Novolog given per [facility]. Pt placed on tele, NSR. Fall risk precautions in place. Incontinent of urine at times. 2 drains noted to ABD.  The R later

## 2019-12-26 NOTE — ED INITIAL ASSESSMENT (HPI)
Pt here for weakness and fever today. Pt was sitting in floor of bathroom when ems arrived. Pt lowered himself no LOC or fall. Pt has hx of pancreatic CA and is being treated for fungal infection. Pt has two drains in his abdomen.

## 2019-12-26 NOTE — ED PROVIDER NOTES
Patient Seen in: [facility] Emergency Department      History   Patient presents with:  Fever    Stated Complaint: fever/weakness    HPI    Fernando Matias is a pleasant 42-year-old male being treated with for pancreatic cancer presenting to the emergency d ENDOSCOPIC RETROGRADE CHOLANGIOPANCREATOGRAPHY (ERCP) N/A 10/9/2018    Performed by Derian Bruner MD at [address] [facility] (ERCP) N/A 8/9/2018    Performed by Derian Bruner MD at [place] [address] Glucose 371 (*)     Sodium 133 (*)     Chloride 90 (*)     BUN 34 (*)     Creatinine 1.95 (*)     Calcium, Total 8.1 (*)     Calculated Osmolality 299 (*)     GFR, Non- 32 (*)     GFR, -American 36 (*)     ALT 12 (*)     Alkaline Shelly are currently awaiting speak with infectious disease. The patient was discussed with hematology oncology. I reviewed with family as well as with the patient laboratory tests radiographic testing with him.   Patient will be admitted in guarded condition  A

## 2019-12-26 NOTE — CM/SW NOTE
12/26/19 1400   CM/SW Referral Data   Referral Source Social Work (self-referral)   Reason for Referral Discharge planning   Informant Patient; Children   Patient Info   Patient's Mental Status Alert;Oriented   Patient's Home Environment House   Patient

## 2019-12-26 NOTE — H&P
[facility]ITAL[facility]  History and Physical     Dl Enrique Patient Status:  Inpatient    11/3/1940 MRN [id number]   [facility] 4NW-A Attending Tru Hills MD   Hosp Day # 0 PCP Kelly Gupta MD     Chief Complaint: fall    Histor Osteoarthritis    • Pain in joints    • Personal history of antineoplastic chemotherapy 09/2018   • PONV (postoperative nausea and vomiting)    • Presence of other cardiac implants and grafts    • Prostatitis    • Renal disorder    • Skin cancer    • Sputu Brother         colon        Allergies:   Clindamycin             DIARRHEA    Medications:  No current facility-administered medications on file prior to encounter.    Senna-Docusate Sodium 8.6-50 MG Oral Tab, Take 1 tablet by mouth daily as needed for cons Disp: , Rfl:   acetaminophen 325 MG Oral Tab, Take 325 mg by mouth every 4 (four) hours as needed for Pain., Disp: , Rfl:   bisacodyl 10 MG Rectal Suppos, Place 10 mg rectally daily as needed. , Disp: , Rfl:   Levothyroxine Sodium 100 MCG Oral Tab, Take 100 --    .0  --    INR  --  1.35*       Recent Labs   Lab 12/26/19  0859   *   BUN 34*   CREATSERUM 1.95*   GFRAA 36*   GFRNAA 32*   CA 8.1*   ALB 2.0*   *   K 3.6   CL 90*   CO2 30.0   ALKPHO 124*   AST 16   ALT 12*   BILT 1.4   TP 7.3

## 2019-12-26 NOTE — PROGRESS NOTES
Pharmacy renal dose adjustment for metoclopramide (Reglan)    Baljit Malik has been prescribed metoclopramide 10 mg every 8 hours as needed for nausea/vomiting. Estimated Creatinine Clearance: 33.2 mL/min (A) (based on SCr of 1.95 mg/dL (H)).     Kermit Goldberg

## 2019-12-27 ENCOUNTER — APPOINTMENT (OUTPATIENT)
Dept: HEMATOLOGY/ONCOLOGY | Facility: HOSPITAL | Age: 79
End: 2019-12-27
Attending: INTERNAL MEDICINE
Payer: MEDICARE

## 2019-12-27 ENCOUNTER — APPOINTMENT (OUTPATIENT)
Dept: ULTRASOUND IMAGING | Facility: HOSPITAL | Age: 79
DRG: 853 | End: 2019-12-27
Attending: INTERNAL MEDICINE
Payer: MEDICARE

## 2019-12-27 ENCOUNTER — APPOINTMENT (OUTPATIENT)
Dept: CT IMAGING | Facility: HOSPITAL | Age: 79
DRG: 853 | End: 2019-12-27
Attending: HOSPITALIST
Payer: MEDICARE

## 2019-12-27 PROBLEM — T85.590A: Status: ACTIVE | Noted: 2019-12-27

## 2019-12-27 LAB
ALBUMIN SERPL-MCNC: 1.6 G/DL (ref 3.4–5)
ALBUMIN/GLOB SERPL: 0.4 {RATIO} (ref 1–2)
ALP LIVER SERPL-CCNC: 104 U/L (ref 45–117)
ALT SERPL-CCNC: 10 U/L (ref 16–61)
ANION GAP SERPL CALC-SCNC: 4 MMOL/L (ref 0–18)
AST SERPL-CCNC: 28 U/L (ref 15–37)
BASOPHILS # BLD AUTO: 0.04 X10(3) UL (ref 0–0.2)
BASOPHILS NFR BLD AUTO: 0.4 %
BILIRUB SERPL-MCNC: 1.1 MG/DL (ref 0.1–2)
BUN BLD-MCNC: 24 MG/DL (ref 7–18)
BUN/CREAT SERPL: 18.8 (ref 10–20)
CALCIUM BLD-MCNC: 7.6 MG/DL (ref 8.5–10.1)
CHLORIDE SERPL-SCNC: 99 MMOL/L (ref 98–112)
CO2 SERPL-SCNC: 33 MMOL/L (ref 21–32)
CREAT BLD-MCNC: 1.28 MG/DL (ref 0.7–1.3)
DEPRECATED RDW RBC AUTO: 58.6 FL (ref 35.1–46.3)
EOSINOPHIL # BLD AUTO: 0.18 X10(3) UL (ref 0–0.7)
EOSINOPHIL NFR BLD AUTO: 1.9 %
ERYTHROCYTE [DISTWIDTH] IN BLOOD BY AUTOMATED COUNT: 17.2 % (ref 11–15)
GLOBULIN PLAS-MCNC: 4.5 G/DL (ref 2.8–4.4)
GLUCOSE BLD-MCNC: 111 MG/DL (ref 70–99)
GLUCOSE BLD-MCNC: 114 MG/DL (ref 70–99)
GLUCOSE BLD-MCNC: 115 MG/DL (ref 70–99)
GLUCOSE BLD-MCNC: 143 MG/DL (ref 70–99)
GLUCOSE BLD-MCNC: 96 MG/DL (ref 70–99)
HCT VFR BLD AUTO: 29.2 % (ref 39–53)
HGB BLD-MCNC: 9.2 G/DL (ref 13–17.5)
IMM GRANULOCYTES # BLD AUTO: 0.07 X10(3) UL (ref 0–1)
IMM GRANULOCYTES NFR BLD: 0.7 %
INR BLD: 1.33 (ref 0.9–1.1)
LYMPHOCYTES # BLD AUTO: 1.32 X10(3) UL (ref 1–4)
LYMPHOCYTES NFR BLD AUTO: 13.6 %
M PROTEIN MFR SERPL ELPH: 6.1 G/DL (ref 6.4–8.2)
MCH RBC QN AUTO: 31 PG (ref 26–34)
MCHC RBC AUTO-ENTMCNC: 31.5 G/DL (ref 31–37)
MCV RBC AUTO: 98.3 FL (ref 80–100)
MONOCYTES # BLD AUTO: 0.82 X10(3) UL (ref 0.1–1)
MONOCYTES NFR BLD AUTO: 8.5 %
NEUTROPHILS # BLD AUTO: 7.25 X10 (3) UL (ref 1.5–7.7)
NEUTROPHILS # BLD AUTO: 7.25 X10(3) UL (ref 1.5–7.7)
NEUTROPHILS NFR BLD AUTO: 74.9 %
OSMOLALITY SERPL CALC.SUM OF ELEC: 287 MOSM/KG (ref 275–295)
PLATELET # BLD AUTO: 185 10(3)UL (ref 150–450)
POTASSIUM SERPL-SCNC: 3.3 MMOL/L (ref 3.5–5.1)
PSA SERPL DL<=0.01 NG/ML-MCNC: 17.1 SECONDS (ref 12.5–14.7)
RBC # BLD AUTO: 2.97 X10(6)UL (ref 3.8–5.8)
SODIUM SERPL-SCNC: 136 MMOL/L (ref 136–145)
WBC # BLD AUTO: 9.7 X10(3) UL (ref 4–11)

## 2019-12-27 PROCEDURE — 99232 SBSQ HOSP IP/OBS MODERATE 35: CPT | Performed by: INTERNAL MEDICINE

## 2019-12-27 PROCEDURE — 76705 ECHO EXAM OF ABDOMEN: CPT | Performed by: INTERNAL MEDICINE

## 2019-12-27 PROCEDURE — 0F9130Z DRAINAGE OF RIGHT LOBE LIVER WITH DRAINAGE DEVICE, PERCUTANEOUS APPROACH: ICD-10-PCS | Performed by: RADIOLOGY

## 2019-12-27 PROCEDURE — 75984 XRAY CONTROL CATHETER CHANGE: CPT | Performed by: HOSPITALIST

## 2019-12-27 PROCEDURE — 0FP030Z REMOVAL OF DRAINAGE DEVICE FROM LIVER, PERCUTANEOUS APPROACH: ICD-10-PCS | Performed by: RADIOLOGY

## 2019-12-27 PROCEDURE — 49423 EXCHANGE DRAINAGE CATHETER: CPT | Performed by: HOSPITALIST

## 2019-12-27 PROCEDURE — 99232 SBSQ HOSP IP/OBS MODERATE 35: CPT | Performed by: HOSPITALIST

## 2019-12-27 RX ORDER — FLUCONAZOLE 200 MG/1
200 TABLET ORAL EVERY EVENING
Qty: 7 TABLET | Refills: 0 | Status: SHIPPED | COMMUNITY
Start: 2019-12-27 | End: 2020-01-30

## 2019-12-27 NOTE — PROGRESS NOTES
[facility]                INFECTIOUS DISEASE PROGRESS NOTE    Chris Decent Patient Status:  Inpatient    11/3/1940 MRN [id number]   [facility] 4NW-A Attending Millicent Chavarria MD   [address] Day # 1 PCP MD Zeb Moya Anxiety     Essential hypertension     Acute renal insufficiency     At risk for falling     Type 2 diabetes mellitus without complication [facility])     Orthopedic aftercare     Tear of left rotator cuff     Osteoarthrosis, localized, primary, knee, left     N

## 2019-12-27 NOTE — PROGRESS NOTES
Heme/Onc Progress Note    Patient Name: Aleah Jada   YOB: 1940   Medical Record Number: [id number]   CSN: [id number]   Attending Physician: Yvrose March M.D. Subjective:  Feels better. No fevers overnight. Minimal abd pain. liquid 30 g, 30 g, Oral, Q15 Min PRN **OR** Glucose-Vitamin C (DEX-4) chewable tab 8 tablet, 8 tablet, Oral, Q15 Min PRN  •  Piperacillin Sod-Tazobactam So (ZOSYN) 4.5 g in dextrose 5 % 100 mL MBP/ADD-vantage, 4.5 g, Intravenous, Q8H  •  0.9% NaCl infusion Range    POC Glucose 318 (H) 70 - 99 mg/dL   POCT GLUCOSE    Collection Time: 12/26/19  9:08 PM   Result Value Ref Range    POC Glucose 141 (H) 70 - 99 mg/dL   COMP METABOLIC PANEL (14)    Collection Time: 12/27/19  5:55 AM   Result Value Ref Range    Gluc Value Ref Range    POC Glucose 111 (H) 70 - 99 mg/dL   PROTHROMBIN TIME (PT)    Collection Time: 12/27/19  8:03 AM   Result Value Ref Range    PT 17.1 (H) 12.5 - 14.7 seconds    INR 1.33 (H) 0.90 - 1.10   POCT GLUCOSE    Collection Time: 12/27/19 12:05 PM week.      NEMO: creatinine elevated, s/p IVF bolus x 2. Awaiting UA/culture.  Better today.        MD Nessa Mota [facility]  [place] [address] [facility], [place] [address]

## 2019-12-27 NOTE — PROCEDURES
[facility]  Procedure Note    Archana Bennett Patient Status:  Inpatient    11/3/1940 MRN [id number]   [facility] 4NW-A Attending Tonya Fry MD   [address] Day # 1 PCP Mariana Sommers MD     Procedure: CT guided exchange abscess drain

## 2019-12-27 NOTE — PHYSICAL THERAPY NOTE
PT order received, chart reviewed. Pt with planned procedure shortly for drain, will hold off on PT at this time. Will f/u for evaluation later during admission.

## 2019-12-27 NOTE — PLAN OF CARE
Spoke with IR nurse about the R lateral drain leaking overnight, only draining small amount in bag. IR nurse will update Dr. Dayne Floyd this morning and call back. Will keep pt NPO at this time. Heparin held d/t possible procedure. Pt updated on POC.  Will renée

## 2019-12-27 NOTE — PLAN OF CARE
Pt alert and oriented, forgetful at times, easily reoriented. VSS and afebrile. C/o a little belly ache, denies pain medication. Denies nausea or SOB. SpO2 within normal limits on 3L O2 per NC. Incontinent of urine, frequents rounding done.  QID accu check

## 2019-12-27 NOTE — PLAN OF CARE
Pt alert and oriented x4, forgetful at times. Pt denies SOB and VSS. No c/o pain. Kept NPO for IR procedure this afternoon. Daughter updated this morning. US completed. Down to IR at 1215. R lateral drain exchanged. Site clean, dry, intact.  Potassium repla

## 2019-12-27 NOTE — PROGRESS NOTES
[facility]  Progress Note     Cindy Hou Patient Status:  Inpatient    11/3/1940 MRN [id number]   [facility] 4NW-A Attending Paul Scales MD   [facility] Day # 1 PCP Marychuy West MD     Chief Complaint: fall    S: Patient has the last 168 hours. Imaging: Imaging data reviewed in Epic.     Medications:   • potassium chloride 40mEq IVPB (peripheral line)  40 mEq Intravenous Once   • sodium chloride 0.9%  30 mL/kg Intravenous Once   • acetaminophen  650 mg Oral Daily with l

## 2019-12-28 VITALS
TEMPERATURE: 98 F | HEART RATE: 72 BPM | BODY MASS INDEX: 31.15 KG/M2 | WEIGHT: 230 LBS | HEIGHT: 72 IN | DIASTOLIC BLOOD PRESSURE: 54 MMHG | OXYGEN SATURATION: 93 % | RESPIRATION RATE: 16 BRPM | SYSTOLIC BLOOD PRESSURE: 127 MMHG

## 2019-12-28 LAB
ANION GAP SERPL CALC-SCNC: 5 MMOL/L (ref 0–18)
BASOPHILS # BLD AUTO: 0.03 X10(3) UL (ref 0–0.2)
BASOPHILS NFR BLD AUTO: 0.4 %
BUN BLD-MCNC: 16 MG/DL (ref 7–18)
BUN/CREAT SERPL: 14.8 (ref 10–20)
CALCIUM BLD-MCNC: 7.9 MG/DL (ref 8.5–10.1)
CHLORIDE SERPL-SCNC: 104 MMOL/L (ref 98–112)
CO2 SERPL-SCNC: 30 MMOL/L (ref 21–32)
CREAT BLD-MCNC: 1.08 MG/DL (ref 0.7–1.3)
DEPRECATED RDW RBC AUTO: 58.7 FL (ref 35.1–46.3)
EOSINOPHIL # BLD AUTO: 0.17 X10(3) UL (ref 0–0.7)
EOSINOPHIL NFR BLD AUTO: 2.4 %
ERYTHROCYTE [DISTWIDTH] IN BLOOD BY AUTOMATED COUNT: 17.4 % (ref 11–15)
GLUCOSE BLD-MCNC: 100 MG/DL (ref 70–99)
GLUCOSE BLD-MCNC: 104 MG/DL (ref 70–99)
GLUCOSE BLD-MCNC: 182 MG/DL (ref 70–99)
HCT VFR BLD AUTO: 28.6 % (ref 39–53)
HGB BLD-MCNC: 9.2 G/DL (ref 13–17.5)
IMM GRANULOCYTES # BLD AUTO: 0.03 X10(3) UL (ref 0–1)
IMM GRANULOCYTES NFR BLD: 0.4 %
L PNEUMO AG UR QL: NEGATIVE
LYMPHOCYTES # BLD AUTO: 0.87 X10(3) UL (ref 1–4)
LYMPHOCYTES NFR BLD AUTO: 12.1 %
MCH RBC QN AUTO: 31.5 PG (ref 26–34)
MCHC RBC AUTO-ENTMCNC: 32.2 G/DL (ref 31–37)
MCV RBC AUTO: 97.9 FL (ref 80–100)
MONOCYTES # BLD AUTO: 0.66 X10(3) UL (ref 0.1–1)
MONOCYTES NFR BLD AUTO: 9.2 %
NEUTROPHILS # BLD AUTO: 5.44 X10 (3) UL (ref 1.5–7.7)
NEUTROPHILS # BLD AUTO: 5.44 X10(3) UL (ref 1.5–7.7)
NEUTROPHILS NFR BLD AUTO: 75.5 %
OSMOLALITY SERPL CALC.SUM OF ELEC: 289 MOSM/KG (ref 275–295)
PLATELET # BLD AUTO: 173 10(3)UL (ref 150–450)
POTASSIUM SERPL-SCNC: 3.4 MMOL/L (ref 3.5–5.1)
RBC # BLD AUTO: 2.92 X10(6)UL (ref 3.8–5.8)
SODIUM SERPL-SCNC: 139 MMOL/L (ref 136–145)
STREP PNEUMO ANTIGEN, URINE: NEGATIVE
WBC # BLD AUTO: 7.2 X10(3) UL (ref 4–11)

## 2019-12-28 PROCEDURE — 99232 SBSQ HOSP IP/OBS MODERATE 35: CPT | Performed by: INTERNAL MEDICINE

## 2019-12-28 PROCEDURE — 99232 SBSQ HOSP IP/OBS MODERATE 35: CPT | Performed by: HOSPITALIST

## 2019-12-28 RX ORDER — CALCIUM CARBONATE 200(500)MG
500 TABLET,CHEWABLE ORAL 2 TIMES DAILY PRN
Status: DISCONTINUED | OUTPATIENT
Start: 2019-12-28 | End: 2019-12-28

## 2019-12-28 RX ORDER — POTASSIUM CHLORIDE 20 MEQ/1
40 TABLET, EXTENDED RELEASE ORAL EVERY 4 HOURS
Status: COMPLETED | OUTPATIENT
Start: 2019-12-28 | End: 2019-12-28

## 2019-12-28 RX ORDER — TORSEMIDE 20 MG/1
20 TABLET ORAL DAILY
Qty: 30 TABLET | Refills: 0 | Status: SHIPPED | OUTPATIENT
Start: 2019-12-28 | End: 2020-01-30

## 2019-12-28 RX ORDER — ONDANSETRON 4 MG/1
4 TABLET, FILM COATED ORAL EVERY 8 HOURS PRN
Qty: 30 TABLET | Refills: 0 | Status: SHIPPED | OUTPATIENT
Start: 2019-12-28 | End: 2020-03-13

## 2019-12-28 RX ORDER — MAGNESIUM OXIDE 400 MG (241.3 MG MAGNESIUM) TABLET
3 TABLET NIGHTLY
Qty: 30 TABLET | Refills: 0 | Status: SHIPPED | OUTPATIENT
Start: 2019-12-28

## 2019-12-28 RX ORDER — TORSEMIDE 20 MG/1
20 TABLET ORAL DAILY
Status: DISCONTINUED | OUTPATIENT
Start: 2019-12-28 | End: 2019-12-28

## 2019-12-28 NOTE — PLAN OF CARE
Problem: Patient/Family Goals  Goal: Patient/Family Short Term Goal  Description  Patient's Short Term Goal: Have no fever and be able to go home today.     Interventions:   - IV antibiotics  -Biliary drain care  - See additional Care Plan goals for speci comes and goes, does not want more pain meds. Pt and pt's daughter wants to be discharged today. Spoke to ID   this morning, MD will see pt this evening and make recommendations. Will continue to monitor.    1600: Verified with  about

## 2019-12-28 NOTE — PROGRESS NOTES
Heme/Onc Progress Note    Patient Name: Irvin Mohs   YOB: 1940   Medical Record Number: [id number]   CSN: [id number]     Subjective: NAEO. 3.5/10 abdominal pain controlled with norco. Mild nausea. S/p drain exchange. Afebrile.  Hb stabl **OR** glucose (DEX4) oral liquid 30 g, 30 g, Oral, Q15 Min PRN **OR** Glucose-Vitamin C (DEX-4) chewable tab 8 tablet, 8 tablet, Oral, Q15 Min PRN  •  Piperacillin Sod-Tazobactam So (ZOSYN) 4.5 g in dextrose 5 % 100 mL MBP/ADD-vantage, 4.5 g, Intravenous, PT 17.1 (H) 12.5 - 14.7 seconds    INR 1.33 (H) 0.90 - 1.10   POCT GLUCOSE    Collection Time: 12/27/19 12:05 PM   Result Value Ref Range    POC Glucose 96 70 - 99 mg/dL   POCT GLUCOSE    Collection Time: 12/27/19  5:39 PM   Result Value Ref Range    POC G LIMITED (CPT=76705), 12/14/2019, 9:33. INDICATIONS:  2 drains in place. Eval for residual or unaddressed fluid collection. Purulent material expressed from around the R lateral drain site.  Possible subcutaneous a     PATIENT STATED HISTORY: (As transc

## 2019-12-28 NOTE — PROGRESS NOTES
[facility]  Progress Note      Denise Ardon Patient Status:  Inpatient    11/3/1940 MRN [id number]   [facility] 4NW-A Attending Cherelle Diaz MD   Hosp Day # 2 PCP Yole Jean Baptiste MD     [de-identified]year-old male with perihepatic fluid co

## 2019-12-29 NOTE — CONSULTS
[facility]    PATIENT'S NAME: Reginia Ill   ATTENDING PHYSICIAN: CARLEE Gaitan Ada: Latoya Arias M.D.    PATIENT ACCOUNT#:   [de-identified]    LOCATION:  [address] [place] [address]  MEDICAL RECORD #:   [id number]       DATE OF BIRTH course, which is approximately January 2, per the daughter. No additional antibiotics would be necessary at this time as long as drainage is adequate.     Dictated By Tressa Gil M.D.  d: 12/28/2019 15:44:14  t: 12/28/2019 21:56:42  [facility] [id number]

## 2019-12-30 NOTE — DISCHARGE SUMMARY
[facility] [place] [facility]ITAL[facility]  DISCHARGE SUMMARY     Radha Donahue Patient Status:  Inpatient    11/3/1940 MRN [id number]   [facility] 4NW-A Attending Paris Prince MD   [facility] Day # 2 PCP Tae Lamb MD     Date of Admission: 2019  Date o Patient is a [de-identified] yo male w/ history of readmissions 2/2 biliary obstruction, bacteremia, SBP, bile leak. He had 2 drains placed by IR on 12/14. He presented w/ sepsis, poor drain output. IR exchanged lateral drain and output improved.  He was weaned off O needed.    Quantity:  30 each  Refills:  0     Senna-Docusate Sodium 8.6-50 MG Tabs  Commonly known as:  SENOKOT S  What changed:    · how much to take  · when to take this  · reasons to take this      Take 1 tablet by mouth daily as needed for constipation Nuritional and correctional scales ::::  Inject 1 unit of insulin for every 20 points blood glucose is greater than 140 mg/dL Inject insulin into the skin prior to meals Inject 1 unit of insulin for every 5 grams of carbohydrates eaten Inject within 10 min Provider Sharon Bhardwaji   1/8/2020  3:45 PM Ledy Yoo, PT SNPT [facility] [place]   1/9/2020  1:40 PM Andrade Antonio MD SGINP [facility] SUB GI   1/15/2020  3:45 PM Teetee Lange [address]   1/20/2020  3:45 PM Ledy Yoo, PT SNPT EDW

## 2019-12-31 ENCOUNTER — HOSPITAL ENCOUNTER (OUTPATIENT)
Dept: CT IMAGING | Facility: HOSPITAL | Age: 79
Discharge: HOME | End: 2019-12-31
Attending: RADIOLOGY
Payer: MEDICARE

## 2019-12-31 DIAGNOSIS — K75.0 LIVER ABSCESS: ICD-10-CM

## 2019-12-31 PROCEDURE — 76080 X-RAY EXAM OF FISTULA: CPT | Performed by: RADIOLOGY

## 2019-12-31 PROCEDURE — 49424 ASSESS CYST CONTRAST INJECT: CPT | Performed by: RADIOLOGY

## 2020-01-03 ENCOUNTER — APPOINTMENT (OUTPATIENT)
Dept: GENERAL RADIOLOGY | Facility: HOSPITAL | Age: 80
DRG: 435 | End: 2020-01-03
Attending: EMERGENCY MEDICINE
Payer: MEDICARE

## 2020-01-03 ENCOUNTER — APPOINTMENT (OUTPATIENT)
Dept: HEMATOLOGY/ONCOLOGY | Facility: HOSPITAL | Age: 80
End: 2020-01-03
Attending: INTERNAL MEDICINE
Payer: MEDICARE

## 2020-01-03 ENCOUNTER — HOSPITAL ENCOUNTER (INPATIENT)
Facility: HOSPITAL | Age: 80
LOS: 6 days | Discharge: HOME HEALTH | DRG: 435 | End: 2020-01-10
Attending: EMERGENCY MEDICINE | Admitting: INTERNAL MEDICINE
Payer: MEDICARE

## 2020-01-03 ENCOUNTER — APPOINTMENT (OUTPATIENT)
Dept: CT IMAGING | Facility: HOSPITAL | Age: 80
DRG: 435 | End: 2020-01-03
Attending: EMERGENCY MEDICINE
Payer: MEDICARE

## 2020-01-03 ENCOUNTER — TELEPHONE (OUTPATIENT)
Dept: HEMATOLOGY/ONCOLOGY | Facility: HOSPITAL | Age: 80
End: 2020-01-03

## 2020-01-03 DIAGNOSIS — J90 PLEURAL EFFUSION ON RIGHT: Primary | ICD-10-CM

## 2020-01-03 PROCEDURE — 99223 1ST HOSP IP/OBS HIGH 75: CPT | Performed by: INTERNAL MEDICINE

## 2020-01-03 PROCEDURE — 71045 X-RAY EXAM CHEST 1 VIEW: CPT | Performed by: EMERGENCY MEDICINE

## 2020-01-03 PROCEDURE — 71275 CT ANGIOGRAPHY CHEST: CPT | Performed by: EMERGENCY MEDICINE

## 2020-01-03 PROCEDURE — 74177 CT ABD & PELVIS W/CONTRAST: CPT | Performed by: EMERGENCY MEDICINE

## 2020-01-03 RX ORDER — HYDROCODONE BITARTRATE AND ACETAMINOPHEN 5; 325 MG/1; MG/1
2 TABLET ORAL ONCE
Status: COMPLETED | OUTPATIENT
Start: 2020-01-03 | End: 2020-01-03

## 2020-01-03 NOTE — TELEPHONE ENCOUNTER
Patient's daughter is calling to get an oxygen order for patient. Oxygen saturation is running 88% then it goes up to 90% when he takes a deep breath. Informed daughter, patient would need to be seen in the office to further evaluate.  Patient was scheduled

## 2020-01-03 NOTE — ED INITIAL ASSESSMENT (HPI)
Patient arrives from home with EMS for evaluation of low o2 sats and generalized weakness. He was released from hospital on the 28th after admission for sepsis d/t pneumonia with a hx of pancreatic cancer.

## 2020-01-04 ENCOUNTER — APPOINTMENT (OUTPATIENT)
Dept: GENERAL RADIOLOGY | Facility: HOSPITAL | Age: 80
DRG: 435 | End: 2020-01-04
Attending: INTERNAL MEDICINE
Payer: MEDICARE

## 2020-01-04 ENCOUNTER — APPOINTMENT (OUTPATIENT)
Dept: ULTRASOUND IMAGING | Facility: HOSPITAL | Age: 80
DRG: 435 | End: 2020-01-04
Attending: INTERNAL MEDICINE
Payer: MEDICARE

## 2020-01-04 PROCEDURE — 0W9930Z DRAINAGE OF RIGHT PLEURAL CAVITY WITH DRAINAGE DEVICE, PERCUTANEOUS APPROACH: ICD-10-PCS | Performed by: INTERNAL MEDICINE

## 2020-01-04 PROCEDURE — 71045 X-RAY EXAM CHEST 1 VIEW: CPT | Performed by: INTERNAL MEDICINE

## 2020-01-04 PROCEDURE — 32557 INSERT CATH PLEURA W/ IMAGE: CPT | Performed by: INTERNAL MEDICINE

## 2020-01-04 PROCEDURE — 99232 SBSQ HOSP IP/OBS MODERATE 35: CPT | Performed by: HOSPITALIST

## 2020-01-04 RX ORDER — IPRATROPIUM BROMIDE AND ALBUTEROL SULFATE 2.5; .5 MG/3ML; MG/3ML
3 SOLUTION RESPIRATORY (INHALATION) EVERY 4 HOURS PRN
Status: DISCONTINUED | OUTPATIENT
Start: 2020-01-04 | End: 2020-01-10

## 2020-01-04 RX ORDER — PANTOPRAZOLE SODIUM 40 MG/1
40 TABLET, DELAYED RELEASE ORAL
Status: DISCONTINUED | OUTPATIENT
Start: 2020-01-04 | End: 2020-01-10

## 2020-01-04 RX ORDER — MAGNESIUM OXIDE 400 MG (241.3 MG MAGNESIUM) TABLET
3 TABLET NIGHTLY
Status: DISCONTINUED | OUTPATIENT
Start: 2020-01-04 | End: 2020-01-10

## 2020-01-04 RX ORDER — POTASSIUM CHLORIDE 20 MEQ/1
40 TABLET, EXTENDED RELEASE ORAL EVERY 4 HOURS
Status: COMPLETED | OUTPATIENT
Start: 2020-01-04 | End: 2020-01-04

## 2020-01-04 RX ORDER — LEVOTHYROXINE SODIUM 0.1 MG/1
100 TABLET ORAL
Status: DISCONTINUED | OUTPATIENT
Start: 2020-01-04 | End: 2020-01-10

## 2020-01-04 RX ORDER — ONDANSETRON 2 MG/ML
4 INJECTION INTRAMUSCULAR; INTRAVENOUS EVERY 6 HOURS PRN
Status: DISCONTINUED | OUTPATIENT
Start: 2020-01-04 | End: 2020-01-10

## 2020-01-04 RX ORDER — ENOXAPARIN SODIUM 100 MG/ML
40 INJECTION SUBCUTANEOUS DAILY
Status: DISCONTINUED | OUTPATIENT
Start: 2020-01-04 | End: 2020-01-10

## 2020-01-04 RX ORDER — HYDROCODONE BITARTRATE AND ACETAMINOPHEN 5; 325 MG/1; MG/1
1-2 TABLET ORAL EVERY 4 HOURS PRN
Status: DISCONTINUED | OUTPATIENT
Start: 2020-01-04 | End: 2020-01-10

## 2020-01-04 RX ORDER — ALFUZOSIN HYDROCHLORIDE 10 MG/1
10 TABLET, EXTENDED RELEASE ORAL
Status: DISCONTINUED | OUTPATIENT
Start: 2020-01-04 | End: 2020-01-10

## 2020-01-04 RX ORDER — POLYETHYLENE GLYCOL 3350 17 G/17G
17 POWDER, FOR SOLUTION ORAL DAILY PRN
Status: DISCONTINUED | OUTPATIENT
Start: 2020-01-04 | End: 2020-01-10

## 2020-01-04 RX ORDER — FLUCONAZOLE 100 MG/1
200 TABLET ORAL EVERY EVENING
Status: DISCONTINUED | OUTPATIENT
Start: 2020-01-04 | End: 2020-01-04 | Stop reason: ALTCHOICE

## 2020-01-04 RX ORDER — SENNA AND DOCUSATE SODIUM 50; 8.6 MG/1; MG/1
1 TABLET, FILM COATED ORAL DAILY PRN
Status: DISCONTINUED | OUTPATIENT
Start: 2020-01-04 | End: 2020-01-07

## 2020-01-04 RX ORDER — TORSEMIDE 20 MG/1
20 TABLET ORAL DAILY
Status: DISCONTINUED | OUTPATIENT
Start: 2020-01-04 | End: 2020-01-10

## 2020-01-04 RX ORDER — DEXTROSE MONOHYDRATE 25 G/50ML
50 INJECTION, SOLUTION INTRAVENOUS
Status: DISCONTINUED | OUTPATIENT
Start: 2020-01-04 | End: 2020-01-10

## 2020-01-04 RX ORDER — MAGNESIUM HYDROXIDE 1200 MG/15ML
30 LIQUID ORAL EVERY 8 HOURS
Status: DISCONTINUED | OUTPATIENT
Start: 2020-01-04 | End: 2020-01-09

## 2020-01-04 RX ORDER — LIDOCAINE HYDROCHLORIDE 10 MG/ML
1 INJECTION, SOLUTION INFILTRATION; PERINEURAL ONCE
Status: DISCONTINUED | OUTPATIENT
Start: 2020-01-04 | End: 2020-01-10

## 2020-01-04 RX ORDER — FLUCONAZOLE 100 MG/1
200 TABLET ORAL EVERY EVENING
Status: DISCONTINUED | OUTPATIENT
Start: 2020-01-04 | End: 2020-01-05

## 2020-01-04 NOTE — ED PROVIDER NOTES
Patient Seen in: [facility] Emergency Department      History   Patient presents with:  Fatigue    Stated Complaint: GENERALIZED WEAKNESS    HPI    [id number] male, complicated medical history here for generalized weakness.     He was recently admit uncontrolled    • Unspecified essential hypertension    • Visual impairment     glasses   • Wears glasses               Past Surgical History:   Procedure Laterality Date   • COLONOSCOPY     • ENDOSCOPIC RETROGRADE CHOLANGIOPANCREATOGRAPHY (ERCP) N/A 11/25 are equal, round, and reactive to light. Neck: Normal range of motion. Neck supple. No JVD present. Cardiovascular: Normal rate and regular rhythm. Pulmonary/Chest: Effort normal and breath sounds normal. No stridor. Abdominal: Soft.  There is no Sinus rhythm subtle ST changes in the lateral precordial leads. Potassium 3.0, bicarb elevated at 36. White count normal.  Hemoglobin at his baseline. Chest x-ray personally reviewed, change from previous.   CT angiogram ordered with abdomi pleural effusions. 5. There is a small amount of consolidation within the right middle lobe, right lower lobe, and left lower lobe. This may represent atelectasis with a superimposed infectious process not excluded.     Dictated by: Ana Oscar MD on the left lobe of the liver with air in the gallbladder. There is also some gallbladder wall thickening which may represent inflammatory disease.  4. Inhomogeneous enhancement to the left lobe of the liver with 2 fluid collections in the periphery of the le

## 2020-01-04 NOTE — ED NOTES
Patient repositioned for comfort after linens and depends changed. He denies new c/o and appears comfortable.

## 2020-01-04 NOTE — PLAN OF CARE
NURSING ADMISSION NOTE      Patient admitted via cart. Oriented to room. Safety precautions initiated. Bed in low position. Call light in reach. Admitted patient from ED due to Right Pleural Effusion.  Plan of care: possible Thoracentesis in the

## 2020-01-04 NOTE — H&P
[facility]  History and Physical     Susan Wilhelm Patient Status:  Inpatient    11/3/1940 MRN [id number]   [facility] 4NW-A Attending Yordan Suero MD   Hosp Day # 0 PCP Swetha Haider MD     Chief Complaint: SOB    History CHOLANGIOPANCREATOGRAPHY (ERCP) N/A 11/25/2019    Performed by Tim Perez MD at [address] [facility] (ERCP) N/A 10/9/2018    Performed by Tim Perez MD at [address] [facility] total) by mouth every evening., Disp: 7 tablet, Rfl: 0  Senna-Docusate Sodium 8.6-50 MG Oral Tab, Take 1 tablet by mouth daily as needed for constipation. , Disp: 60 tablet, Rfl: 0  PEG 3350 Oral Powd Pack, Take 17 g by mouth daily as needed. , Disp: 30 each 3  Sodium Chloride 0.9 % Injection Solution, 10 mL by Intracatheter route daily. , Disp: 40 Syringe, Rfl: 0  lidocaine 4 % External Patch, Place 1 patch onto the skin daily. , Disp: , Rfl:   acetaminophen 325 MG Oral Tab, Take 325 mg by mouth every 4 (four) on SCr of 1.21 mg/dL). Recent Labs   Lab 01/03/20  2315   PTP 15.8*   INR 1.20*       No results for input(s): TROP, CK in the last 168 hours. Imaging: Imaging data reviewed in Epic. ASSESSMENT / PLAN:     1.  Shortness of breath with evidence of

## 2020-01-04 NOTE — CONSULTS
[facility]  Report of Consultation    Gypsyananth Rogers Patient Status:  Inpatient    11/3/1940 MRN [id number]   [facility] 4NW-A Attending Libia Henderson MD   Hosp Day # 0 PCP Francesca Mae MD     Reason for Consultation:  Pleural ef of blood transfusion 2016   • Irregular bowel habits    • Jaundice    • Kidney stone    • Leg swelling    • Nausea    • Osteoarthritis    • Pain in joints    • Personal history of antineoplastic chemotherapy 09/2018   • PONV (postoperative nausea and vomit Subcutaneous Solution Pen-injector, Inject 1-50 Units into the skin 3 (three) times daily before meals.  Nuritional and correctional scales ::::  Inject 1 unit of insulin for every 20 points blood glucose is greater than 140 mg/dL Inject insulin into the sk Oral Tab, Take 325 mg by mouth every 4 (four) hours as needed for Pain., Disp: , Rfl: , Taking  bisacodyl 10 MG Rectal Suppos, Place 10 mg rectally daily as needed. , Disp: , Rfl:         Review of Systems:    Constitutional: Daughter thinks his weight has rhythm   Abdomen: soft, non-distended, no masses, no guarding, no     Rebound.   Minimally tender right upper quadrant no focal masses no significant ascites appreciated   Extremity: Trace lower extremity edema bilaterally seems nontender   Skin: No rashes drainage catheter has been removed. There is a small amount of ascites noted within the abdomen. No bowel obstruction. 3. A common bile duct stent is noted. There is pneumobilia in the left lobe of the liver with air in the gallbladder.   There is also s type     Malignant neoplasm of pancreas, unspecified location of malignancy ([address]     Elevated LFTs     Hepatic encephalopathy ([address]     NEMO (acute kidney injury) ([address]     ATN (acute tubular necrosis) (HCC)     Cholangitis     Peritonitis ([address]     Gaby

## 2020-01-04 NOTE — OPERATIVE REPORT
OPERATIVE REPORT     DATE OF OPERATION: 01/04/20    PREOPERATIVE DIAGNOSIS(ES): right loculated pleural effusion    POSTOPERATIVE DIAGNOSIS(ES): same    OPERATION(S) PERFORMED: right sided ultrasound guided 14 Cameroonian pigtail chest tube placement    SURGEON advanced into the pleural space over the guidewire. The obturator and guidewire were then removed with ease keeping the chest tube in place. The chest tube was then secured at the proximal skin pepe using 2-0 silk suture.  The catheter was then connected to

## 2020-01-05 ENCOUNTER — APPOINTMENT (OUTPATIENT)
Dept: GENERAL RADIOLOGY | Facility: HOSPITAL | Age: 80
DRG: 435 | End: 2020-01-05
Attending: INTERNAL MEDICINE
Payer: MEDICARE

## 2020-01-05 PROCEDURE — 71045 X-RAY EXAM CHEST 1 VIEW: CPT | Performed by: INTERNAL MEDICINE

## 2020-01-05 PROCEDURE — 99232 SBSQ HOSP IP/OBS MODERATE 35: CPT | Performed by: HOSPITALIST

## 2020-01-05 RX ORDER — POTASSIUM CHLORIDE 20 MEQ/1
20 TABLET, EXTENDED RELEASE ORAL ONCE
Status: COMPLETED | OUTPATIENT
Start: 2020-01-06 | End: 2020-01-06

## 2020-01-05 RX ORDER — MORPHINE SULFATE 4 MG/ML
1 INJECTION, SOLUTION INTRAMUSCULAR; INTRAVENOUS ONCE
Status: COMPLETED | OUTPATIENT
Start: 2020-01-05 | End: 2020-01-06

## 2020-01-05 RX ORDER — ALBUMIN (HUMAN) 12.5 G/50ML
SOLUTION INTRAVENOUS
Status: DISPENSED
Start: 2020-01-05 | End: 2020-01-05

## 2020-01-05 NOTE — PROGRESS NOTES
[facility]Critical Care Progress Note     SUBJECTIVE/Interval history: All events, procedures, notes reviewed. No acute events, tolerated chest tube yesterday, pain is \"okay\".  Thinks dyspnea is much improve --   --  1.23   GFRAA 65  --   --  64   GFRNAA 56*  --   --  55*   CA 8.3*  --   --  8.2*     --   --  139   K 3.0* 3.4* 4.0 3.8   CL 97*  --   --  101   CO2 36.0*  --   --  34.0*     Recent [address]   Lab 01/03/20 1911 01/05/20  0554   RBC 3.16* 3.09* Approved by: Avtar Brown MD on 1/04/2020 at 16:49          Xr Chest Ap Portable  (cpt=71045)    Result Date: 1/3/2020  CONCLUSION:  Airspace disease in the right midlung and base with stable right pleural effusion when compared to 12/26/2019.   Stable he 1/03/2020 at 21:26     Approved by: Saba Brower MD on 1/03/2020 at 21:42            • Albumin Human       • [START ON 1/6/2020] Potassium Chloride ER  20 mEq Oral Once   • insulin detemir  16 Units Subcutaneous [email or web address]   • Levothyroxine Sodium 2  · Wean o2 for sats >89%  · Mobilize/OOBTC as tolerated    Garland Quintana MD  [facility]

## 2020-01-05 NOTE — PROGRESS NOTES
[facility]OSPITAL[facility]  Progress Note     Sania Martini Patient Status:  Inpatient    11/3/1940 MRN [id number]   [facility] 4NW-A Attending Larissa Yang MD   Hosp Day # 0 PCP Martina Mclean MD     Chief Complaint: sob dizziness    S: Vianca Carroll TROP, CK in the last 168 hours. Imaging: Imaging data reviewed in Epic.     Medications:   • insulin detemir  16 Units [email or web address] [email or web address]   • Levothyroxine Sodium  100 mcg Oral Before breakfast   • melatonin  3 mg Oral Nightly   • pancrelipa

## 2020-01-05 NOTE — PLAN OF CARE
Breathing better after chest tube was placed at bedside by Dr Chel Lambert. Head of bed maintained above 30deg. No SOB,O2 sats 98-99%. 270cc serous fluid noted on the collection system. Irrigated with saline as ordered.  Patient remains afebrile,still on zosyn I

## 2020-01-05 NOTE — PLAN OF CARE
Problem: RESPIRATORY - ADULT  Goal: Achieves optimal ventilation and oxygenation  Description  INTERVENTIONS:  - Assess for changes in respiratory status  - Assess for changes in mentation and behavior  - Position to facilitate oxygenation and minimize r pain,3/10,Hydrocodone 2 tabs given. .Will continue to monitor.

## 2020-01-05 NOTE — PROGRESS NOTES
[facility] HOSPITAL[facility]  Progress Note     Krystyna Ivy Patient Status:  Inpatient    11/3/1940 MRN [id number]   [facility] 4NW-A Attending Tiffany Richardson MD   Hosp Day # 1 PCP Dolores Bartlett MD     Chief Complaint: dizziness sob    S: KJNZGT on SCr of 1.23 mg/dL). Recent Labs   Lab 01/03/20  2315   PTP 15.8*   INR 1.20*       No results for input(s): TROP, CK in the last 168 hours. Imaging: Imaging data reviewed in Epic.     Medications:   • Albumin Human       • [START ON 1/6/2020]

## 2020-01-05 NOTE — PLAN OF CARE
Resting in bed. TPA was administered per pulmo instructions/ orders & now draining the tube after dwelling TPA for 2H. Patient has complained of severe pain on the R chest area above the chest tube insertion site.  Norco 2 tabs were given to patient,had minor

## 2020-01-06 ENCOUNTER — APPOINTMENT (OUTPATIENT)
Dept: GENERAL RADIOLOGY | Facility: HOSPITAL | Age: 80
DRG: 435 | End: 2020-01-06
Attending: INTERNAL MEDICINE
Payer: MEDICARE

## 2020-01-06 PROCEDURE — 99232 SBSQ HOSP IP/OBS MODERATE 35: CPT | Performed by: HOSPITALIST

## 2020-01-06 PROCEDURE — 71045 X-RAY EXAM CHEST 1 VIEW: CPT | Performed by: INTERNAL MEDICINE

## 2020-01-06 RX ORDER — INSULIN LISPRO 100 [IU]/ML
INJECTION, SOLUTION INTRAVENOUS; SUBCUTANEOUS
Qty: 18 ML | Refills: 0 | OUTPATIENT
Start: 2020-01-06

## 2020-01-06 RX ORDER — MORPHINE SULFATE 4 MG/ML
1 INJECTION, SOLUTION INTRAMUSCULAR; INTRAVENOUS EVERY 12 HOURS PRN
Status: DISCONTINUED | OUTPATIENT
Start: 2020-01-06 | End: 2020-01-09

## 2020-01-06 NOTE — CONSULTS
Hem/Onc Report of Consultation    Patient Name: Baljit Malik   YOB: 1940   Medical Record Number: [id number]   CSN: [id number]   Referring Provider(s): RUDDY Meehan  Date of Consultation: 1/6/2020     Reason for Consultation: Charlette Bence 2016   • Irregular bowel habits    • Jaundice    • Kidney stone    • Leg swelling    • Nausea    • Osteoarthritis    • Pain in joints    • Personal history of antineoplastic chemotherapy 09/2018   • PONV (postoperative nausea and vomiting)    • Presence of History:  Social History    Socioeconomic History      Marital status:       Spouse name: Not on file      Number of children: Not on file      Years of education: Not on file      Highest education level: Not on file    Occupational History      Oc for Injection (MIST2) syringe 30 mL, 5 mg, Intrapleural, Q12H  [COMPLETED] morphINE sulfate (PF) 4 MG/ML injection 1 mg, 1 mg, Intravenous, Once  HYDROcodone-acetaminophen (NORCO) 5-325 MG per tab 1-2 tablet, 1-2 tablet, Oral, Q4H PRN  insulin detemir (LEV MG/ML injection 100 mL, 100 mL, Intravenous, ONCE PRN  [COMPLETED] HYDROcodone-acetaminophen (NORCO) 5-325 MG per tab 2 tablet, 2 tablet, Oral, Once        Home Medications:  [COMPLETED] iohexol (OMNIPAQUE) 350 MG/ML injection 1 mL  [COMPLETED] Potassium C tablet (4 mg total) by mouth every 8 (eight) hours as needed for Nausea. fluconazole 200 MG Oral Tab, Take 1 tablet (200 mg total) by mouth every evening. Senna-Docusate Sodium 8.6-50 MG Oral Tab, Take 1 tablet by mouth daily as needed for constipation. 325 mg by mouth every 4 (four) hours as needed for Pain. bisacodyl 10 MG Rectal Suppos, Place 10 mg rectally daily as needed.   [] nystatin 315555 UNIT/ML Mouth/Throat Suspension, Take 5 mL (500,000 Units total) by mouth 4 (four) times daily for 10 01/05/2020    .0 01/03/2020    .0 12/28/2019       Radiology:  PROCEDURE:  XR CHEST AP PORTABLE  (CPT=71045)     TECHNIQUE:  AP chest radiograph was obtained. COMPARISON:  RUSS MULLEN, XR CHEST AP PORTABLE  (CPT=71045), 1/05/2020, 5:40. bronchograms in the left lower lobe. There is some consolidative pneumonia within the right middle lobe and posterior segment of the right upper lobe.   There is consolidative pneumonia versus   passive atelectasis involving large portions of the right low RETROPERITONEUM:  No mass or adenopathy. BOWEL/MESENTERY:  No free air. Small amount of ascites. Uncomplicated diverticulosis of the left and sigmoid colon. Moderate amount of stool seen throughout the colon.   Fluid is noted adjacent to the fundus cancer: Treatment on hold due to recent infectious issues and hospitalizations. Hepatic subcapsular fluid collections: likely secondary to biliary drains recently removed. Will recheck hepatic function levels tomorrow.      Anemia: Chronic illness.  Hgb

## 2020-01-06 NOTE — PLAN OF CARE
Problem: RESPIRATORY - ADULT  Goal: Achieves optimal ventilation and oxygenation  Description  INTERVENTIONS:  - Assess for changes in respiratory status  - Assess for changes in mentation and behavior  - Position to facilitate oxygenation and minimize r pain meds given as needed per [facility] with good relief.voding  Chest tube intact and draining, paged MD for IV pain med prior to TPA, x1 dose of morphine ordered,  TPA administered per pulmonology orders, draining now after 2hr hold, pt tolerated well,  chelsea

## 2020-01-06 NOTE — PLAN OF CARE
Problem: RESPIRATORY - ADULT  Goal: Achieves optimal ventilation and oxygenation  Description  INTERVENTIONS:  - Assess for changes in respiratory status  - Assess for changes in mentation and behavior  - Position to facilitate oxygenation and minimize r bleeding injury  Description  (Example usage: patient with low platelets)  INTERVENTIONS:  - Avoid intramuscular injections, enemas and rectal medication administration  - Ensure safe mobilization of patient  - Hold pressure on venipuncture sites to [employer]

## 2020-01-06 NOTE — HOME CARE LIAISON
Received call from Tamiko that family is wondering about DME that [facility] ordered for pt. Per DME specialist through [facility], [facility]  Per Medicare guidelines, Medicare will not cover another walker f

## 2020-01-06 NOTE — CM/SW NOTE
01/06/20 1500   CM/SW Referral Data   Referral Source Social Work (self-referral)   Reason for Referral Discharge planning   Informant Patient   Readmission Assessment   Was full assessment completed by SW/KRIS on prior admission?  Yes   Was the recommende

## 2020-01-06 NOTE — DIETARY MALNUTRITION NOTE
[facility]    NUTRITION INITIAL ASSESSMENT    Pt meets severe malnutrition criteria.     CRITERIA FOR MALNUTRITION DIAGNOSIS:  Criteria for severe malnutrition diagnosis: chronic illness related to wt loss greater than 5% in 1 month, energy intake less Encounters:  01/04/20 : 96.5 kg (212 lb 11.9 oz)  12/26/19 : 104.3 kg (230 lb)  12/16/19 : 106.1 kg (234 lb)  12/07/19 : 113.6 kg (250 lb 6.4 oz)  10/25/19 : 103 kg (227 lb)  08/13/19 : 104.3 kg (230 lb)  07/05/19 : 106.6 kg (235 lb)  05/07/19 : 103 kg (22

## 2020-01-06 NOTE — PROGRESS NOTES
[facility]  Progress Note    Krystyna Ivy Patient Status:  Inpatient    11/3/1940 MRN [id number]   [facility] 4NW-A Attending Tiffany Richardson MD   Hosp Day # 2 PCP Dolores Bartlett MD     Subjective:  Krystyna Ivy is a(n) 79 year Recent Labs   Lab 01/03/20  2315   INR 1.20*   PTT 35.4       No results for input(s): ABGPHT, KLEDRC9R, KJXZD0U, ABGHCO3, ABGBE, TEMP, ARYAN, SITE, DEV, THGB in the last 168 hours.     Invalid input(s): VGQ89ETE, CHOB    Invalid input(s): Cori Cifuentes (ZOFRAN) injection 4 mg, 4 mg, Intravenous, Q6H PRN  Insulin Aspart Pen (NOVOLOG) 100 UNIT/ML flexpen 1-5 Units, 1-5 Units, Subcutaneous, TID CC and HS  Piperacillin Sod-Tazobactam So (ZOSYN) 3.375 g in dextrose 5 % 100 mL ADD-vantage, 3.375 g, Intravenous

## 2020-01-06 NOTE — TELEPHONE ENCOUNTER
[facility] hosp  Future Appointments   Date Time Provider Sharon Laura   1/8/2020  3:45 PM Yoel Irvin, PT SNPT [facility] [place]   1/9/2020  1:40 PM Tim Perez MD S[facility]P [facility] SUB GI   1/14/2020 12:45 PM Roxanna Rothman MD [address] [facility]   1

## 2020-01-06 NOTE — PROGRESS NOTES
[facility]ITAL[facility]  Progress Note     Regan Mckeon Patient Status:  Inpatient    11/3/1940 MRN [id number]   [facility] 4NW-A Attending Deshaun Meneses MD   Hosp Day # 2 PCP Marychuy Hemphill MD     Chief Complaint: pain near chest tube    S: --    BILT 0.8  --   --  0.8   TP 6.8  --   --   --        Estimated Creatinine Clearance: 52.6 mL/min (based on SCr of 1.23 mg/dL). Recent Labs   Lab 01/03/20  2315   PTP 15.8*   INR 1.20*       No results for input(s): TROP, CK in the last 168 hours. CT

## 2020-01-07 ENCOUNTER — APPOINTMENT (OUTPATIENT)
Dept: GENERAL RADIOLOGY | Facility: HOSPITAL | Age: 80
DRG: 435 | End: 2020-01-07
Attending: INTERNAL MEDICINE
Payer: MEDICARE

## 2020-01-07 PROBLEM — J90 LOCULATED PLEURAL EFFUSION: Status: ACTIVE | Noted: 2020-01-07

## 2020-01-07 PROBLEM — C25.9 PANCREATIC CANCER (HCC): Status: ACTIVE | Noted: 2020-01-07

## 2020-01-07 PROBLEM — K65.1 INTRA-ABDOMINAL ABSCESS (HCC): Status: ACTIVE | Noted: 2020-01-07

## 2020-01-07 PROCEDURE — 71045 X-RAY EXAM CHEST 1 VIEW: CPT | Performed by: INTERNAL MEDICINE

## 2020-01-07 PROCEDURE — 99222 1ST HOSP IP/OBS MODERATE 55: CPT | Performed by: INTERNAL MEDICINE

## 2020-01-07 PROCEDURE — 99232 SBSQ HOSP IP/OBS MODERATE 35: CPT | Performed by: HOSPITALIST

## 2020-01-07 RX ORDER — DOCUSATE SODIUM 100 MG/1
100 CAPSULE, LIQUID FILLED ORAL 2 TIMES DAILY
Status: DISCONTINUED | OUTPATIENT
Start: 2020-01-07 | End: 2020-01-10

## 2020-01-07 NOTE — PROGRESS NOTES
[facility] Pulmonary/Critical Care Progress Note     SUBJECTIVE/Interval history: All events, procedures, notes reviewed. No acute events, denies acute concerns today. Still feels fatigued and weak today.  Denies dyspne 1. 20   GFRAA 65  --   --  64 66   GFRNAA 56*  --   --  55* 57*   CA 8.3*  --   --  8.2* 8.4*     --   --  139 136   K 3.0*   < > 4.0 3.8 5.0   CL 97*  --   --  101 101   CO2 36.0*  --   --  34.0* 31.0    < > = values in this interval not displayed. Approved by: Milo Lu MD on 1/06/2020 at 8:36          Xr Chest Ap Portable  (cpt=71045)    Result Date: 1/5/2020  CONCLUSION:    Stable chest tube medial right base. No pneumothorax. Minimal atelectasis at the right base stable.   Mild elevation ri dextrose **OR** glucose **OR** Glucose-Vitamin C, ondansetron HCl, ipratropium-albuterol    Radiology  1/5/20 CXR reviewed and compared to previous shows minimal effusion (although no effusion was seen on previous chest xray; but demonstrated on CT) with a

## 2020-01-07 NOTE — PLAN OF CARE
Recommend Home with HHPT upon D/C  Problem: Impaired Functional Mobility  Goal: Achieve highest/safest level of mobility/gait  Description  Interventions:  - Assess patient's functional ability and stability  - Promote increasing activity/tolerance for mob

## 2020-01-07 NOTE — PLAN OF CARE
Problem: RESPIRATORY - ADULT  Goal: Achieves optimal ventilation and oxygenation  Description  INTERVENTIONS:  - Assess for changes in respiratory status  - Assess for changes in mentation and behavior  - Position to facilitate oxygenation and minimize r 96-99%, BG Lorenza MD notified, ordered to give 15units of novolog and 16 of levimir, prn pain meds given per [facility] prior to TPA administration for chest tube, tolerated well, chest tube intact and draining, received IVabx with no reaction, will continue to mon

## 2020-01-07 NOTE — PHYSICAL THERAPY NOTE
PHYSICAL THERAPY EVALUATION - INPATIENT     Room Number: 423/423-A  Evaluation Date: 1/7/2020  Type of Evaluation: Initial  Physician Order: PT Eval and Treat    Presenting Problem: R pleural effusion  Reason for Therapy: Mobility Dysfunction and Dis Severo Posey, MD at [address] [facility] (ERCP) N/A 7/27/2018    Performed by Severo Posey, MD at [address] [facility] (EUS) N/A 10/9/2018    Performed by Severo Posey, MD at [facility] EN NEUROLOGICAL FINDINGS                      ACTIVITY TOLERANCE                         O2 WALK                  AM-PAC '6-Clicks' INPATIENT SHORT FORM - BASIC MOBILITY  How much difficulty does the patient currently have. ..  -   Turning over in bed (inc dynamic sitting and standing balance, gait dysfunction, pain. Functional outcome measures completed include AMPAC. Based on this evaluation, patient's clinical presentation is stable and overall the evaluation complexity is considered low.   These impairm 16

## 2020-01-07 NOTE — PROGRESS NOTES
[facility]PITAL[facility]  Progress Note     Dario Sapp Patient Status:  Inpatient    11/3/1940 MRN [id number]   [facility] 4NW-A Attending Nakia Quesada MD   Hosp Day # 3 PCP Jacky Tracy MD     Chief Complaint: pain near chest tube    S: 5.0   CL 97*  --   --  101 101   CO2 36.0*  --   --  34.0* 31.0   ALKPHO 121*  --   --   --  112   AST 13*  --   --   --  16   ALT 12*  --   --   --  11*   BILT 0.8  --   --  0.8 0.8   TP 6.8  --   --   --  6.6    < > = values in this interval not displaye cushion while up in chair  6. BPH  1. uroxatral   7. DM type 2  1. Hyperglycemia protocol  2.  Increased levemir and added nutritional scale, continue correctional scale also.      Quality:  · DVT ppx: lovenox  · Code status: Full code     Estimated date of

## 2020-01-07 NOTE — PLAN OF CARE
Problem: RESPIRATORY - ADULT  Goal: Achieves optimal ventilation and oxygenation  Description  INTERVENTIONS:  - Assess for changes in respiratory status  - Assess for changes in mentation and behavior  - Position to facilitate oxygenation and minimize r and fluid volume within ordered parameters to optimize cerebral perfusion and minimize risk of hemorrhage  - Monitor temperature, glucose, and sodium. Initiate appropriate interventions as ordered  Outcome: Progressing   Pt. Alert, oriented today.  No episo

## 2020-01-08 ENCOUNTER — APPOINTMENT (OUTPATIENT)
Dept: GENERAL RADIOLOGY | Facility: HOSPITAL | Age: 80
DRG: 435 | End: 2020-01-08
Attending: INTERNAL MEDICINE
Payer: MEDICARE

## 2020-01-08 ENCOUNTER — APPOINTMENT (OUTPATIENT)
Dept: CT IMAGING | Facility: HOSPITAL | Age: 80
DRG: 435 | End: 2020-01-08
Attending: INTERNAL MEDICINE
Payer: MEDICARE

## 2020-01-08 PROCEDURE — 99232 SBSQ HOSP IP/OBS MODERATE 35: CPT | Performed by: HOSPITALIST

## 2020-01-08 PROCEDURE — 99232 SBSQ HOSP IP/OBS MODERATE 35: CPT | Performed by: INTERNAL MEDICINE

## 2020-01-08 PROCEDURE — 71045 X-RAY EXAM CHEST 1 VIEW: CPT | Performed by: INTERNAL MEDICINE

## 2020-01-08 PROCEDURE — 71250 CT THORAX DX C-: CPT | Performed by: INTERNAL MEDICINE

## 2020-01-08 RX ORDER — CETIRIZINE HYDROCHLORIDE 10 MG/1
10 TABLET ORAL DAILY
Status: DISCONTINUED | OUTPATIENT
Start: 2020-01-08 | End: 2020-01-10

## 2020-01-08 NOTE — PROGRESS NOTES
[facility] Pulmonary/Critical Care Progress Note     SUBJECTIVE/Interval history: All events, procedures, notes reviewed. No acute events, denies acute concerns today. Improved strength today. Has yet to walk.  Pain co 01/07/20  0720   *  --   --  139* 227*   BUN 13  --   --  13 16   CREATSERUM 1.21  --   --  1.23 1.20   GFRAA 65  --   --  64 66   GFRNAA 56*  --   --  55* 57*   CA 8.3*  --   --  8.2* 8.4*     --   --  139 136   K 3.0*   < > 4.0 3.8 5.0   CL stable blunting of the left CP angle. Normal heart size. No CHF. The critical value results were called to the floor nurse at 0906 hours on  1/8/2020. Result read back was performed.     Dictated by: Lanny Connor MD on 1/08/2020 at 8:59     Approved effusion on right. Chest tube in place  1/8/20 CT chest reviewed which shows RLL consolidation and thickened visceral pleural on right lung with tiny hydropneumothorax with chest tube in place.  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

## 2020-01-08 NOTE — PROGRESS NOTES
Heme/Onc Progress Note    Patient Name: Dario Sapp   YOB: 1940   Medical Record Number: [id number]   CSN: [id number]   Attending Physician: Sarah Frost M.D. Subjective:  Feels ok. Pain control is good. No diarrhea.   Eating s breakfast  •  torsemide (DEMADEX) tab 20 mg, 20 mg, Oral, Daily  •  glucose (DEX4) oral liquid 15 g, 15 g, Oral, Q15 Min PRN **OR** Glucose-Vitamin C (DEX-4) chewable tab 4 tablet, 4 tablet, Oral, Q15 Min PRN **OR** dextrose 50 % injection 50 mL, 50 mL, In mmol/L    CO2 31.0 21.0 - 32.0 mmol/L    Anion Gap 4 0 - 18 mmol/L    BUN 16 7 - 18 mg/dL    Creatinine 1.20 0.70 - 1.30 mg/dL    BUN/CREA Ratio 13.3 10.0 - 20.0    Calcium, Total 8.4 (L) 8.5 - 10.1 mg/dL    Calculated Osmolality 290 275 - 295 mOsm/kg    G pockets but is not febrile, and has little pain in the area suggesting this may just be a residua of the prior drains. Will recheck hepatic function levels tomorrow. Will need to re-image in the next few weeks.     Anemia: Due to chronic illness.  Hgb st

## 2020-01-08 NOTE — PROGRESS NOTES
[facility]ITAL[facility]  Progress Note     Irvin Mohs Patient Status:  Inpatient    11/3/1940 MRN [id number]   [facility] 4NW-A Attending Verita Angelucci, MD   Hosp Day # 4 PCP Nadeen Mercado MD     Chief Complaint: pain near chest tube    S 34.0* 31.0   ALKPHO 121*  --   --   --  112   AST 13*  --   --   --  16   ALT 12*  --   --   --  11*   BILT 0.8  --   --  0.8 0.8   TP 6.8  --   --   --  6.6    < > = values in this interval not displayed.        Estimated Creatinine Clearance: 53.9 mL/min discharge: 2-3 days depending on CT results/fluid. Plan of care discussed with patient, RN, Dr. Annemarie Strange.   Await CT chest.    Manny FOX  12:43 PM     Addendum:  CT showed near resolution of effusion - Reviewed w/ pulm - small trapped component

## 2020-01-08 NOTE — PLAN OF CARE
Pt care assumed @ 1930. Pt resting in bed w/ daughter @ bedside. VSS. On 3L 02. Aox4. All evening meds per MAR. Insulin per MAR. Rt chest tube draining per MIST2 protocol. Connected to low suction @ -20 per orders.  No pain or decrease in oxygen saturation transfers and ambulation   Outcome: Progressing

## 2020-01-08 NOTE — PHYSICAL THERAPY NOTE
PHYSICAL THERAPY TREATMENT NOTE - INPATIENT    Room Number: 423/423-A     Session: 1   Number of Visits to Meet Established Goals: 5    Presenting Problem: R pleural effusion    Problem List  Principal Problem:    Pleural effusion on right  Active Problem 7/27/2018    Performed by Tobi Ku MD at [address] [facility] ([facility]) N/A 10/9/2018    Performed by Tobi Ku MD at [address] [facility] ([facility]) N/A 7/27/2018    Performed by Tobi Ku MD at [facility] EN Standardized Score (AM-PAC Scale): 47.67   CMS Modifier (G-Code): CJ    FUNCTIONAL ABILITY STATUS  Gait Assessment   Gait Assistance: Dependent assistance(per FIM distance - otherwise Min A)  Distance (ft): 3  Assistive Device: Rolling walker  Pattern: S will benefit from continued IP PT, so that patient may achieve highest functional independence/return to baseline. DISCHARGE RECOMMENDATIONS  PT Discharge Recommendations: Home with home health PT     PLAN  PT Treatment Plan: Bed mobility; Endurance;P

## 2020-01-09 ENCOUNTER — APPOINTMENT (OUTPATIENT)
Dept: GENERAL RADIOLOGY | Facility: HOSPITAL | Age: 80
DRG: 435 | End: 2020-01-09
Attending: HOSPITALIST
Payer: MEDICARE

## 2020-01-09 PROCEDURE — 99232 SBSQ HOSP IP/OBS MODERATE 35: CPT | Performed by: HOSPITALIST

## 2020-01-09 PROCEDURE — 71045 X-RAY EXAM CHEST 1 VIEW: CPT | Performed by: HOSPITALIST

## 2020-01-09 PROCEDURE — 99232 SBSQ HOSP IP/OBS MODERATE 35: CPT | Performed by: INTERNAL MEDICINE

## 2020-01-09 RX ORDER — ALPRAZOLAM 0.25 MG/1
0.25 TABLET ORAL NIGHTLY PRN
Status: DISCONTINUED | OUTPATIENT
Start: 2020-01-09 | End: 2020-01-10

## 2020-01-09 NOTE — PLAN OF CARE
Problem: RESPIRATORY - ADULT  Goal: Achieves optimal ventilation and oxygenation  Description  INTERVENTIONS:  - Assess for changes in respiratory status  - Assess for changes in mentation and behavior  - Position to facilitate oxygenation and minimize r Ensure safe mobilization of patient  - Hold pressure on venipuncture sites to achieve adequate hemostasis  - Assess for signs and symptoms of internal bleeding  - Monitor lab trends  - Patient is to report abnormal signs of bleeding to staff  - Avoid use o

## 2020-01-09 NOTE — PROGRESS NOTES
[facility]  Progress Note    Archana Bennett Patient Status:  Inpatient    11/3/1940 MRN [id number]   [facility] 4NW-A Attending Hakan Baeza MD   [facility] Day # 5 PCP Mariana Sommers MD     Subjective:  Archana Bennett is a(n) [de-identified] yea PTT 35.4       Cultures: cultures remain neg     Radiology:  Ct Chest (cpt=71250)    Result Date: 1/8/2020  CONCLUSION:  There has been near complete resolution of the previously seen loculated effusion on the right.   Overall trace residual hydropneumoth kidney injury) ([facility] [address]     ATN (acute tubular necrosis) (HCC)     Cholangitis     Peritonitis (HCC)     Anasarca     Anemia complicating neoplastic disease     Leukocytosis     Leukocytosis, unspecified type     Other ascites     Nosocomial pneumonia     Pan

## 2020-01-09 NOTE — PROGRESS NOTES
[facility]ITAL[facility]  Progress Note     Denise Ardon Patient Status:  Inpatient    11/3/1940 MRN [id number]   [facility] 4NW-A Attending Sam Medina MD   Hosp Day # 5 PCP Yoel Jean Baptiste MD     Chief Complaint: pain near chest tube    S --   --  55* 57*   CA 8.3*  --   --  8.2* 8.4*   ALB 1.8*  --   --   --  1.6*     --   --  139 136   K 3.0*   < > 4.0 3.8 5.0   CL 97*  --   --  101 101   CO2 36.0*  --   --  34.0* 31.0   ALKPHO 121*  --   --   --  112   AST 13*  --   --   --  16   A billiary leak/subcapsular abscess with recurrent fluid accumulation  1. Prior drains were removed by IR, some gas bubbles in pockets. Labs stable, exam benign. CT chest yesterday showed limited view of liver - overall stable  5.  Severe protein calorie mal Dr Sharee Marques when he returns. Ok for xanax prn at night low dose as performance status would be less important if no cancer treatments to be done. Will focus on trying to remove chest tube and discharging pt then having f/u w/ Dr Sharee Marques as outpt.    -increase

## 2020-01-09 NOTE — DIETARY MALNUTRITION NOTE
[facility]    NUTRITION FOLLOW UP ASSESSMENT    Pt meets severe malnutrition criteria.     CRITERIA FOR MALNUTRITION DIAGNOSIS:  Criteria for severe malnutrition diagnosis: chronic illness related to wt loss greater than 5% in 1 month, energy intake le ANTHROPOMETRICS:  Ht:  182.9 cm (6')  Wt: 99.1 kg (218 lb 8 oz). This is 119% of IBW  BMI: Body mass index is 29.63 kg/m².   IBW: 80.9 kg  Usual Body Wt: 104 kg    WEIGHT HISTORY:   Patient Weight for the past 72 hrs:   Weight   01/04/20 0015 95.3 kg [id number]    Pt is at high nutrition risk    FOLLOW-UP DATE: 1/13    Marty Bae MS, RD, LDN  Clinical Dietitian  Pager# [phone]

## 2020-01-09 NOTE — PROGRESS NOTES
Heme/Onc Progress Note    Patient Name: Denise Ardon   YOB: 1940   Medical Record Number: [id number]   CSN: [id number]     Subjective:    Per daughter bit depressed, really wants to go home. Confusion last night. Pain ok, appetite is ok. Min PRN **OR** glucose (DEX4) oral liquid 30 g, 30 g, Oral, Q15 Min PRN **OR** Glucose-Vitamin C (DEX-4) chewable tab 8 tablet, 8 tablet, Oral, Q15 Min PRN  •  Enoxaparin Sodium (LOVENOX) 40 MG/0.4ML injection 40 mg, 40 mg, Subcutaneous, Daily  •  ondanset 450.0 10(3)uL    MCV 99.7 80.0 - 100.0 fL    MCH 30.8 26.0 - 34.0 pg    MCHC 30.9 (L) 31.0 - 37.0 g/dL    RDW 17.2 (H) 11.0 - 15.0 %    RDW-SD 62.6 (H) 35.1 - 46.3 fL    Neutrophil Absolute Prelim 4.75 1.50 - 7.70 x10 (3) uL    Neutrophil Absolute 4.75 1.5 others. Suresh Costa M.D.     [address] [facility] [address], [place], [id number]    1/9/2020    10:07 AM

## 2020-01-09 NOTE — PLAN OF CARE
Patient disconnected his Chest tube from the suction tubing. Patient seems to be confused would like to go home. Chest tube reconnected back again. Patient asymptomatic, no chest pain, no SOB. Oxygen saturations 94-96% with 2LO2.  Dr. Tyler De Leon made aware, ord

## 2020-01-09 NOTE — OCCUPATIONAL THERAPY NOTE
OCCUPATIONAL THERAPY EVALUATION - INPATIENT     Room Number: 423/423-A  Evaluation Date: 1/9/2020  Type of Evaluation: Initial  Presenting Problem: Pleural effusion    Physician Order: IP Consult to Occupational Therapy  Reason for Therapy: ADL/IADL Dysfun mellitus without mention of complication, not stated as uncontrolled    • Unspecified essential hypertension    • Visual impairment     glasses   • Wears glasses        Past Surgical History  Past Surgical History:   Procedure Laterality Date   • Ceasar Santiago from [employer] for medication, meals, cleaning, laundry, standby assist for bathing. The pt is typically able to ambulate independently.   The pt is a retired Deacon and enjoys listening to [employer], watching political TV shows, playing on his comput role, goals, POC and recommendations.   Patient readily engaged in all tasks presented  Patient required mod A to tigre socks while seated EOB  Supine to EOB with supervision, HOB at 30deg  EOB to stand with CGA and cues for hand placement  Patient tolerated HIGH  5+ performance deficits    Client Assessment/Performance Deficits MODERATE - Comorbidities and min to mod modifications of tasks    Clinical Decision Making MODERATE - Analysis of occupational profile, detailed assessments, several treatment options

## 2020-01-09 NOTE — PROGRESS NOTES
Patient daughter states patient is sleepy and not hungary this am states he wants to sleep. Noted that patient is confused this am he answered the phone that was not ringing with a kleenex box up to his ear. Daughter states he just sleepy.  She also states

## 2020-01-10 ENCOUNTER — APPOINTMENT (OUTPATIENT)
Dept: GENERAL RADIOLOGY | Facility: HOSPITAL | Age: 80
DRG: 435 | End: 2020-01-10
Attending: INTERNAL MEDICINE
Payer: MEDICARE

## 2020-01-10 ENCOUNTER — APPOINTMENT (OUTPATIENT)
Dept: GENERAL RADIOLOGY | Facility: HOSPITAL | Age: 80
DRG: 435 | End: 2020-01-10
Attending: HOSPITALIST
Payer: MEDICARE

## 2020-01-10 VITALS
OXYGEN SATURATION: 94 % | WEIGHT: 210.38 LBS | DIASTOLIC BLOOD PRESSURE: 73 MMHG | BODY MASS INDEX: 29 KG/M2 | HEART RATE: 85 BPM | RESPIRATION RATE: 18 BRPM | SYSTOLIC BLOOD PRESSURE: 105 MMHG | TEMPERATURE: 98 F

## 2020-01-10 PROCEDURE — 71045 X-RAY EXAM CHEST 1 VIEW: CPT | Performed by: INTERNAL MEDICINE

## 2020-01-10 PROCEDURE — 99239 HOSP IP/OBS DSCHRG MGMT >30: CPT | Performed by: HOSPITALIST

## 2020-01-10 PROCEDURE — 71045 X-RAY EXAM CHEST 1 VIEW: CPT | Performed by: HOSPITALIST

## 2020-01-10 PROCEDURE — 99232 SBSQ HOSP IP/OBS MODERATE 35: CPT | Performed by: INTERNAL MEDICINE

## 2020-01-10 RX ORDER — HYDROCODONE BITARTRATE AND ACETAMINOPHEN 5; 325 MG/1; MG/1
1 TABLET ORAL EVERY 4 HOURS PRN
Qty: 30 TABLET | Refills: 0 | Status: SHIPPED | OUTPATIENT
Start: 2020-01-10 | End: 2020-01-21

## 2020-01-10 NOTE — PROGRESS NOTES
Heme/Onc Progress Note    Patient Name: Gypsy Rogers   YOB: 1940   Medical Record Number: [id number]   CSN: [id number]     Subjective:    Awake, appears comfortable but bit confused. Keeps asking to go home.      Objective:    Vitals:   01 mL, Intravenous, Q15 Min PRN **OR** glucose (DEX4) oral liquid 30 g, 30 g, Oral, Q15 Min PRN **OR** Glucose-Vitamin C (DEX-4) chewable tab 8 tablet, 8 tablet, Oral, Q15 Min PRN  •  Enoxaparin Sodium (LOVENOX) 40 MG/0.4ML injection 40 mg, 40 mg, Subcutaneou poor tolerance for prior interventions.   His pleural fluid is suspicious for malignant effusion but would not ramon any chemo till PS improves and there is clear evidence of progression.      Hepatic subcapsular fluid collections:  Likely due to leaks from

## 2020-01-10 NOTE — PROGRESS NOTES
Patient is confused asking for his ride to come and pick him up explained that he is in the hospital and that his daughter would be coming back soon. Patient sitting up in chair chest tube closed no resp distress noted.  Patient daughter is going to bring h

## 2020-01-10 NOTE — CM/SW NOTE
SW received a call from the pt's daughter in regards to pt. She stated she just had general questions about hospice. SW answered questions. Informed her the would need to speak w/MD Pandya in regards to that.  She stated she just want to know how it works,

## 2020-01-10 NOTE — PLAN OF CARE
Pt alert and oriented, forgetful at times. Easily reoriented. VSS, afebrile. Denies pain. Denies nausea. Chest tube remains clamped. Pt denies SOB and no respiratory distress noted.  SpO2 within normal limits on room air while awake, pt desaturates will asl intracranial pressure  - Maintain blood pressure and fluid volume within ordered parameters to optimize cerebral perfusion and minimize risk of hemorrhage  - Monitor temperature, glucose, and sodium.  Initiate appropriate interventions as ordered  Outcome:

## 2020-01-10 NOTE — PROGRESS NOTES
[facility] Pulmonary/Critical Care Progress Note     SUBJECTIVE/Interval history: All events, procedures, notes reviewed. No acute events, denies acute concerns today. Feels slightly fatigued, but no dyspnea or cough. 01/07/20  0720   *  --   --  139* 227*   BUN 13  --   --  13 16   CREATSERUM 1.21  --   --  1.23 1.20   GFRAA 65  --   --  64 66   GFRNAA 56*  --   --  55* 57*   CA 8.3*  --   --  8.2* 8.4*     --   --  139 136   K 3.0*   < > 4.0 3.8 5.0   CL prior exam.  Limited views of the upper abdomen again demonstrate a subcapsular right hepatic fluid collection which is overall stable in size from the prior exam.   Dictated by: Alee Ocasio MD on 1/08/2020 at 16:44     Approved by: Alee Ocasio MD on 1/08/20 Pantoprazole Sodium  40 mg Oral QAM AC   • Alfuzosin HCl ER  10 mg Oral Daily with breakfast   • torsemide  20 mg Oral Daily   • enoxaparin  40 mg Subcutaneous Daily   • piperacillin-tazobactam  3.375 g Intravenous Q8H   • lidocaine  1 mL Intradermal Once assessment on exertion prior to d/c    Keara Butcher MD  [facility]

## 2020-01-11 NOTE — DISCHARGE SUMMARY
[facility] HOSPITALIST  DISCHARGE SUMMARY     Bindu Gupta Patient Status:  Inpatient    11/3/1940 MRN [id number]   [facility] 4NW-A Attending Saravanan Barger MD   [facility] Day # 6 PCP Gayle Bower MD     Date of Admission: 1/3/2020  Date of help.       History of Present Illness: Gypsy Rogers is a [de-identified]year old male with medical history of metastatic pancreatic cancer with biliary stent who was recently discharged from the hospital where he was treated for Klebsiella bacteremia and cholang Prescription details   acetaminophen 325 MG Tabs  Commonly known as:  TYLENOL      Take 650 mg by mouth daily with lunch.    Refills:  0     acetaminophen 325 MG Tabs  Commonly known as:  TYLENOL      Take 325 mg by mouth every 4 (four) hours as needed for Sodium 40 MG Tbec  Commonly known as:  PROTONIX      Take 1 tablet (40 mg total) by mouth daily. Quantity:  30 tablet  Refills:  11     PEG 3350 Pack  Commonly known as:  MIRALAX      Take 17 g by mouth daily as needed.    Quantity:  30 each  Refills:  0 (36.3 °C)-98.5 °F (36.9 °C)] 98.4 °F (36.9 °C)  Pulse:  [71-85] 85  Resp:  [12-18] 18  BP: (105-155)/(57-73) 105/73    Physical Exam:    General: No acute distress. Respiratory: Clear to auscultation bilaterally. No wheezes. No rhonchi.   Cardiovascular:

## 2020-01-11 NOTE — PLAN OF CARE
Pt. Alert and oriented x4; forgetful at times. RA, VSS. IV abx. C/o some back pain this afternoon; PRN norco and hot pack given with relief. Chest tube clamped; pulm by this afternoon and removed.  Plan for pt to d/c this evening with daughter, Myra Doty; up allow for food preferences  - Enhance eating environment  Outcome: Adequate for Discharge     Problem: NEUROLOGICAL - ADULT  Goal: Achieves stable or improved neurological status  Description  INTERVENTIONS  - Assess for and report changes in neurological and bathing  - Educate and encourage patient/family in tolerated functional activity level and precautions during self-care     Outcome: Adequate for Discharge     Problem: Diabetes/Glucose Control  Goal: Glucose maintained within prescribed range  Descrip

## 2020-01-11 NOTE — CM/SW NOTE
01/11/20 1600   Discharge disposition   Expected discharge disposition Home-Health   Name of Facillity/Home Care/Hospice Residential

## 2020-01-14 ENCOUNTER — APPOINTMENT (OUTPATIENT)
Dept: HEMATOLOGY/ONCOLOGY | Facility: HOSPITAL | Age: 80
End: 2020-01-14
Attending: INTERNAL MEDICINE
Payer: MEDICARE

## 2020-01-20 ENCOUNTER — APPOINTMENT (OUTPATIENT)
Dept: PHYSICAL THERAPY | Age: 80
End: 2020-01-20
Attending: FAMILY MEDICINE
Payer: MEDICARE

## 2020-01-20 RX ORDER — INSULIN GLARGINE 100 [IU]/ML
INJECTION, SOLUTION SUBCUTANEOUS
Qty: 15 ML | Refills: 0 | OUTPATIENT
Start: 2020-01-20

## 2020-01-20 NOTE — TELEPHONE ENCOUNTER
LOV Unknown  RTC Unknown  Future Appointments   Date Time Provider Sharon Sanchez   1/27/2020  7:30 AM PF US 1 PF [facility]     Denied.  Appointment required

## 2020-01-21 RX ORDER — HYDROCODONE BITARTRATE AND ACETAMINOPHEN 5; 325 MG/1; MG/1
1 TABLET ORAL EVERY 4 HOURS PRN
Qty: 120 TABLET | Refills: 0 | Status: SHIPPED | OUTPATIENT
Start: 2020-01-21 | End: 2020-01-27

## 2020-01-21 RX ORDER — CETIRIZINE HYDROCHLORIDE 10 MG/1
10 TABLET ORAL DAILY
COMMUNITY

## 2020-01-22 ENCOUNTER — APPOINTMENT (OUTPATIENT)
Dept: PHYSICAL THERAPY | Age: 80
End: 2020-01-22
Attending: FAMILY MEDICINE
Payer: MEDICARE

## 2020-01-22 ENCOUNTER — HOSPITAL ENCOUNTER (OUTPATIENT)
Dept: ULTRASOUND IMAGING | Facility: HOSPITAL | Age: 80
Discharge: HOME | End: 2020-01-22
Attending: INTERNAL MEDICINE
Payer: MEDICARE

## 2020-01-22 ENCOUNTER — LAB ENCOUNTER (OUTPATIENT)
Dept: LAB | Facility: HOSPITAL | Age: 80
End: 2020-01-22
Attending: INTERNAL MEDICINE
Payer: MEDICARE

## 2020-01-22 VITALS
HEIGHT: 72 IN | WEIGHT: 186 LBS | DIASTOLIC BLOOD PRESSURE: 80 MMHG | BODY MASS INDEX: 25.19 KG/M2 | RESPIRATION RATE: 12 BRPM | TEMPERATURE: 97 F | HEART RATE: 94 BPM | OXYGEN SATURATION: 94 % | SYSTOLIC BLOOD PRESSURE: 117 MMHG

## 2020-01-22 DIAGNOSIS — R18.8 OTHER ASCITES: Primary | ICD-10-CM

## 2020-01-22 DIAGNOSIS — C25.9 MALIGNANT NEOPLASM OF PANCREAS, UNSPECIFIED LOCATION OF MALIGNANCY (HCC): ICD-10-CM

## 2020-01-22 DIAGNOSIS — R10.9 ABDOMINAL PAIN, ACUTE: ICD-10-CM

## 2020-01-22 DIAGNOSIS — R79.89 ELEVATED LFTS: ICD-10-CM

## 2020-01-22 DIAGNOSIS — R18.8 OTHER ASCITES: ICD-10-CM

## 2020-01-22 DIAGNOSIS — Z79.4 TYPE 2 DIABETES MELLITUS WITHOUT COMPLICATION, WITH LONG-TERM CURRENT USE OF INSULIN (HCC): ICD-10-CM

## 2020-01-22 DIAGNOSIS — D63.0 ANEMIA COMPLICATING NEOPLASTIC DISEASE: ICD-10-CM

## 2020-01-22 DIAGNOSIS — E11.9 TYPE 2 DIABETES MELLITUS WITHOUT COMPLICATION, WITH LONG-TERM CURRENT USE OF INSULIN (HCC): ICD-10-CM

## 2020-01-22 LAB
ALBUMIN SERPL-MCNC: 2.3 G/DL (ref 3.4–5)
ALBUMIN/GLOB SERPL: 0.4 {RATIO} (ref 1–2)
ALP LIVER SERPL-CCNC: 176 U/L (ref 45–117)
ALT SERPL-CCNC: 13 U/L (ref 16–61)
ANION GAP SERPL CALC-SCNC: 8 MMOL/L (ref 0–18)
AST SERPL-CCNC: 21 U/L (ref 15–37)
BASOPHILS # BLD AUTO: 0.03 X10(3) UL (ref 0–0.2)
BASOPHILS NFR BLD AUTO: 0.4 %
BILIRUB SERPL-MCNC: 0.7 MG/DL (ref 0.1–2)
BUN BLD-MCNC: 18 MG/DL (ref 7–18)
BUN/CREAT SERPL: 14.1 (ref 10–20)
CALCIUM BLD-MCNC: 8.6 MG/DL (ref 8.5–10.1)
CANCER AG19-9 SERPL-ACNC: 516.2 U/ML (ref ?–37)
CEA SERPL-MCNC: 1.4 NG/ML (ref ?–5)
CHLORIDE SERPL-SCNC: 93 MMOL/L (ref 98–112)
CO2 SERPL-SCNC: 34 MMOL/L (ref 21–32)
CREAT BLD-MCNC: 1.28 MG/DL (ref 0.7–1.3)
DEPRECATED RDW RBC AUTO: 59.7 FL (ref 35.1–46.3)
EOSINOPHIL # BLD AUTO: 0.15 X10(3) UL (ref 0–0.7)
EOSINOPHIL NFR BLD AUTO: 1.8 %
ERYTHROCYTE [DISTWIDTH] IN BLOOD BY AUTOMATED COUNT: 16.4 % (ref 11–15)
GLOBULIN PLAS-MCNC: 6.1 G/DL (ref 2.8–4.4)
GLUCOSE BLD-MCNC: 275 MG/DL (ref 70–99)
HCT VFR BLD AUTO: 37.5 % (ref 39–53)
HGB BLD-MCNC: 12 G/DL (ref 13–17.5)
IMM GRANULOCYTES # BLD AUTO: 0.04 X10(3) UL (ref 0–1)
IMM GRANULOCYTES NFR BLD: 0.5 %
INR BLD: 1.03 (ref 0.9–1.1)
LYMPHOCYTES # BLD AUTO: 1.94 X10(3) UL (ref 1–4)
LYMPHOCYTES NFR BLD AUTO: 23.2 %
M PROTEIN MFR SERPL ELPH: 8.4 G/DL (ref 6.4–8.2)
MCH RBC QN AUTO: 31.5 PG (ref 26–34)
MCHC RBC AUTO-ENTMCNC: 32 G/DL (ref 31–37)
MCV RBC AUTO: 98.4 FL (ref 80–100)
MONOCYTES # BLD AUTO: 0.88 X10(3) UL (ref 0.1–1)
MONOCYTES NFR BLD AUTO: 10.5 %
NEUTROPHILS # BLD AUTO: 5.34 X10 (3) UL (ref 1.5–7.7)
NEUTROPHILS # BLD AUTO: 5.34 X10(3) UL (ref 1.5–7.7)
NEUTROPHILS NFR BLD AUTO: 63.6 %
OSMOLALITY SERPL CALC.SUM OF ELEC: 292 MOSM/KG (ref 275–295)
PLATELET # BLD AUTO: 236 10(3)UL (ref 150–450)
POTASSIUM SERPL-SCNC: 3.1 MMOL/L (ref 3.5–5.1)
PSA SERPL DL<=0.01 NG/ML-MCNC: 13.9 SECONDS (ref 12.5–14.7)
RBC # BLD AUTO: 3.81 X10(6)UL (ref 3.8–5.8)
SODIUM SERPL-SCNC: 135 MMOL/L (ref 136–145)
WBC # BLD AUTO: 8.4 X10(3) UL (ref 4–11)

## 2020-01-22 PROCEDURE — 82378 CARCINOEMBRYONIC ANTIGEN: CPT

## 2020-01-22 PROCEDURE — 76700 US EXAM ABDOM COMPLETE: CPT | Performed by: INTERNAL MEDICINE

## 2020-01-22 PROCEDURE — 36415 COLL VENOUS BLD VENIPUNCTURE: CPT

## 2020-01-22 PROCEDURE — 86301 IMMUNOASSAY TUMOR CA 19-9: CPT

## 2020-01-22 PROCEDURE — 85610 PROTHROMBIN TIME: CPT

## 2020-01-22 PROCEDURE — 80053 COMPREHEN METABOLIC PANEL: CPT

## 2020-01-22 PROCEDURE — 85025 COMPLETE CBC W/AUTO DIFF WBC: CPT

## 2020-01-25 ENCOUNTER — HOSPITAL ENCOUNTER (OUTPATIENT)
Dept: CT IMAGING | Facility: HOSPITAL | Age: 80
Discharge: HOME | End: 2020-01-25
Attending: INTERNAL MEDICINE
Payer: MEDICARE

## 2020-01-25 DIAGNOSIS — Z77.22 DAILY EXPOSURE TO TOBACCO SMOKE: ICD-10-CM

## 2020-01-25 DIAGNOSIS — J90 PLEURAL EFFUSION, RIGHT: ICD-10-CM

## 2020-01-25 DIAGNOSIS — M54.9 BACK PAIN: ICD-10-CM

## 2020-01-25 DIAGNOSIS — C25.9 PANCREATIC CANCER (HCC): ICD-10-CM

## 2020-01-25 DIAGNOSIS — C25.0 MALIGNANT NEOPLASM OF HEAD OF PANCREAS (HCC): ICD-10-CM

## 2020-01-25 PROCEDURE — 74177 CT ABD & PELVIS W/CONTRAST: CPT | Performed by: INTERNAL MEDICINE

## 2020-01-25 PROCEDURE — 71260 CT THORAX DX C+: CPT | Performed by: INTERNAL MEDICINE

## 2020-01-27 RX ORDER — VENLAFAXINE HYDROCHLORIDE 150 MG/1
CAPSULE, EXTENDED RELEASE ORAL
Qty: 30 CAPSULE | Refills: 0 | Status: SHIPPED | OUTPATIENT
Start: 2020-01-27 | End: 2020-01-30

## 2020-01-27 RX ORDER — HYDROCODONE BITARTRATE AND ACETAMINOPHEN 10; 325 MG/1; MG/1
1-2 TABLET ORAL EVERY 6 HOURS PRN
Qty: 180 TABLET | Refills: 0 | Status: SHIPPED | OUTPATIENT
Start: 2020-01-27 | End: 2020-02-18 | Stop reason: ALTCHOICE

## 2020-01-30 ENCOUNTER — TELEPHONE (OUTPATIENT)
Dept: HEMATOLOGY/ONCOLOGY | Facility: HOSPITAL | Age: 80
End: 2020-01-30

## 2020-01-30 ENCOUNTER — OFFICE VISIT (OUTPATIENT)
Dept: HEMATOLOGY/ONCOLOGY | Age: 80
End: 2020-01-30
Attending: INTERNAL MEDICINE
Payer: MEDICARE

## 2020-01-30 ENCOUNTER — SOCIAL WORK SERVICES (OUTPATIENT)
Dept: HEMATOLOGY/ONCOLOGY | Facility: HOSPITAL | Age: 80
End: 2020-01-30

## 2020-01-30 VITALS
OXYGEN SATURATION: 92 % | HEIGHT: 72.09 IN | WEIGHT: 187.19 LBS | DIASTOLIC BLOOD PRESSURE: 68 MMHG | TEMPERATURE: 98 F | RESPIRATION RATE: 18 BRPM | SYSTOLIC BLOOD PRESSURE: 112 MMHG | HEART RATE: 83 BPM | BODY MASS INDEX: 25.35 KG/M2

## 2020-01-30 DIAGNOSIS — Z77.22 DAILY EXPOSURE TO TOBACCO SMOKE: ICD-10-CM

## 2020-01-30 DIAGNOSIS — C78.7 SECONDARY ADENOCARCINOMA OF LIVER (HCC): ICD-10-CM

## 2020-01-30 DIAGNOSIS — C25.0 MALIGNANT NEOPLASM OF HEAD OF PANCREAS (HCC): ICD-10-CM

## 2020-01-30 DIAGNOSIS — R09.02 HYPOXEMIA: ICD-10-CM

## 2020-01-30 DIAGNOSIS — C25.0 MALIGNANT NEOPLASM OF HEAD OF PANCREAS (HCC): Primary | ICD-10-CM

## 2020-01-30 PROCEDURE — 99214 OFFICE O/P EST MOD 30 MIN: CPT | Performed by: INTERNAL MEDICINE

## 2020-01-30 RX ORDER — VENLAFAXINE HYDROCHLORIDE 150 MG/1
150 CAPSULE, EXTENDED RELEASE ORAL 2 TIMES DAILY
Qty: 30 CAPSULE | Refills: 0 | Status: SHIPPED | OUTPATIENT
Start: 2020-01-30 | End: 2020-01-30

## 2020-01-30 RX ORDER — FENTANYL 25 UG/H
1 PATCH TRANSDERMAL
Qty: 10 PATCH | Refills: 0 | Status: SHIPPED | OUTPATIENT
Start: 2020-01-30 | End: 2020-02-18 | Stop reason: DRUGHIGH

## 2020-01-30 RX ORDER — VENLAFAXINE HYDROCHLORIDE 150 MG/1
150 CAPSULE, EXTENDED RELEASE ORAL 2 TIMES DAILY
Qty: 60 CAPSULE | Refills: 5 | Status: SHIPPED | OUTPATIENT
Start: 2020-01-30

## 2020-01-30 NOTE — TELEPHONE ENCOUNTER
Pharmacy called to clarify Ventofarine 150 mg BID script sent by Dr Portia Boyd. He only ordered 30 tabs, so does he only want 15 day supply or something else? Please call My to clarify.

## 2020-01-30 NOTE — PROGRESS NOTES
Patient is here for MD f/u for pancreatic cancer. Patient has ongoing epigastric pain. He is on Norco 2 tabs every 6 hours as needed. Patient is having trouble with urinary flow. He is no longer on Flomax as of last week as ordered by PCP.  Ongoing constipa

## 2020-01-30 NOTE — PROGRESS NOTES
Laverne called and spoke with Brigida Sommers, [address] [place] [address] in [place], who reports that they do complete pulse oximetry monitoring for home o2. Laverne faxed orders to [phone].

## 2020-01-31 ENCOUNTER — TELEPHONE (OUTPATIENT)
Dept: HEMATOLOGY/ONCOLOGY | Facility: HOSPITAL | Age: 80
End: 2020-01-31

## 2020-01-31 NOTE — TELEPHONE ENCOUNTER
Lady Gutierrez from [employer] requesting a call back, Fentanyl patches need prior authorization. Please call Lady Gutierrez at [phone] regarding that prior auth.

## 2020-02-03 ENCOUNTER — SOCIAL WORK SERVICES (OUTPATIENT)
Dept: HEMATOLOGY/ONCOLOGY | Facility: HOSPITAL | Age: 80
End: 2020-02-03

## 2020-02-03 PROBLEM — F43.21 ADJUSTMENT DISORDER WITH DEPRESSED MOOD: Status: ACTIVE | Noted: 2019-12-12

## 2020-02-03 PROBLEM — R65.21 SEPTIC SHOCK (HCC): Status: ACTIVE | Noted: 2019-12-10

## 2020-02-03 PROBLEM — A41.9 SEPTIC SHOCK (HCC): Status: ACTIVE | Noted: 2019-12-10

## 2020-02-03 NOTE — PROGRESS NOTES
Sw called and spoke with Santana Verde who reported that order was received and they will contact patient to schedule testing.

## 2020-02-04 NOTE — PROGRESS NOTES
[facility]    PATIENT'S NAME: Kiran Ocampo   ATTENDING PHYSICIAN: Briseida Mccartney M.D.    PATIENT ACCOUNT #: [de-identified] LOCATION: [address] [place] [address] RECORD #: [id number] YOB: 1940   DATE OF SERVICE: 01/30/2020       CANCER CE feeling overwhelmed with multiple home health visits. He does not feel as though his physical therapy and home health is helping him, and he would like to discontinue this.   He had a CT scan just done on January 25, and he would like to review that with katie common bile duct stent. He has no focal pancreatic mass lesion visible, but it is somewhat obscured around the head of the pancreas. IMPRESSION:  Pancreatic cancer. He had liver metastases that were seen on prior scans.   The current scan is done with

## 2020-02-07 ENCOUNTER — DIETICIAN VISIT (OUTPATIENT)
Dept: HEMATOLOGY/ONCOLOGY | Facility: HOSPITAL | Age: 80
End: 2020-02-07

## 2020-02-12 DIAGNOSIS — C25.0 MALIGNANT NEOPLASM OF HEAD OF PANCREAS (HCC): ICD-10-CM

## 2020-02-12 DIAGNOSIS — Z77.22 DAILY EXPOSURE TO TOBACCO SMOKE: ICD-10-CM

## 2020-02-12 DIAGNOSIS — R73.9 HYPERGLYCEMIA: ICD-10-CM

## 2020-02-12 RX ORDER — TAMSULOSIN HYDROCHLORIDE 0.4 MG/1
CAPSULE ORAL
Qty: 90 CAPSULE | Refills: 3 | OUTPATIENT
Start: 2020-02-12

## 2020-02-14 DIAGNOSIS — C25.0 MALIGNANT NEOPLASM OF HEAD OF PANCREAS (HCC): Primary | ICD-10-CM

## 2020-02-14 RX ORDER — FENTANYL 50 UG/H
1 PATCH TRANSDERMAL
Qty: 10 PATCH | Refills: 0 | Status: SHIPPED | OUTPATIENT
Start: 2020-02-14 | End: 2020-02-27

## 2020-02-14 NOTE — PROGRESS NOTES
Spoke to patient's daughter. Will arrange for celiac plexus block. Discussed with Dr Paul Gandhi and they will call her to schedule.   Hira Delgado MD

## 2020-02-18 RX ORDER — DOCUSATE SODIUM 100 MG/1
100 CAPSULE, LIQUID FILLED ORAL DAILY
COMMUNITY

## 2020-02-18 RX ORDER — HYDROCODONE BITARTRATE AND ACETAMINOPHEN 10; 325 MG/1; MG/1
1 TABLET ORAL EVERY 6 HOURS PRN
COMMUNITY

## 2020-02-18 RX ORDER — SENNOSIDES 8.6 MG
8.6 TABLET ORAL DAILY
COMMUNITY

## 2020-02-19 ENCOUNTER — HOSPITAL ENCOUNTER (OUTPATIENT)
Dept: CT IMAGING | Facility: HOSPITAL | Age: 80
Discharge: HOME | End: 2020-02-19
Attending: INTERNAL MEDICINE
Payer: MEDICARE

## 2020-02-19 ENCOUNTER — TELEPHONE (OUTPATIENT)
Dept: HEMATOLOGY/ONCOLOGY | Facility: HOSPITAL | Age: 80
End: 2020-02-19

## 2020-02-19 ENCOUNTER — NURSE ONLY (OUTPATIENT)
Dept: LAB | Facility: HOSPITAL | Age: 80
End: 2020-02-19
Attending: INTERNAL MEDICINE
Payer: MEDICARE

## 2020-02-19 VITALS
BODY MASS INDEX: 25.06 KG/M2 | DIASTOLIC BLOOD PRESSURE: 76 MMHG | RESPIRATION RATE: 16 BRPM | WEIGHT: 185 LBS | HEIGHT: 72 IN | HEART RATE: 79 BPM | SYSTOLIC BLOOD PRESSURE: 141 MMHG | TEMPERATURE: 98 F | OXYGEN SATURATION: 94 %

## 2020-02-19 DIAGNOSIS — G54.8 CELIAC PLEXUS SYNDROME: ICD-10-CM

## 2020-02-19 DIAGNOSIS — C25.0 MALIGNANT NEOPLASM OF HEAD OF PANCREAS (HCC): ICD-10-CM

## 2020-02-19 DIAGNOSIS — G54.8 CELIAC PLEXUS SYNDROME: Primary | ICD-10-CM

## 2020-02-19 LAB
ALBUMIN SERPL-MCNC: 2.3 G/DL (ref 3.4–5)
ALBUMIN/GLOB SERPL: 0.4 {RATIO} (ref 1–2)
ALP LIVER SERPL-CCNC: 284 U/L (ref 45–117)
ALT SERPL-CCNC: 23 U/L (ref 16–61)
ANION GAP SERPL CALC-SCNC: 5 MMOL/L (ref 0–18)
AST SERPL-CCNC: 31 U/L (ref 15–37)
BASOPHILS # BLD AUTO: 0.03 X10(3) UL (ref 0–0.2)
BASOPHILS NFR BLD AUTO: 0.4 %
BILIRUB SERPL-MCNC: 0.8 MG/DL (ref 0.1–2)
BUN BLD-MCNC: 11 MG/DL (ref 7–18)
BUN/CREAT SERPL: 12 (ref 10–20)
CALCIUM BLD-MCNC: 9 MG/DL (ref 8.5–10.1)
CANCER AG19-9 SERPL-ACNC: 1327.8 U/ML (ref ?–37)
CEA SERPL-MCNC: 2.2 NG/ML (ref ?–5)
CHLORIDE SERPL-SCNC: 104 MMOL/L (ref 98–112)
CO2 SERPL-SCNC: 29 MMOL/L (ref 21–32)
CREAT BLD-MCNC: 0.92 MG/DL (ref 0.7–1.3)
DEPRECATED RDW RBC AUTO: 56.8 FL (ref 35.1–46.3)
DEPRECATED RDW RBC AUTO: 56.8 FL (ref 35.1–46.3)
EOSINOPHIL # BLD AUTO: 0.18 X10(3) UL (ref 0–0.7)
EOSINOPHIL NFR BLD AUTO: 2.6 %
ERYTHROCYTE [DISTWIDTH] IN BLOOD BY AUTOMATED COUNT: 14.8 % (ref 11–15)
ERYTHROCYTE [DISTWIDTH] IN BLOOD BY AUTOMATED COUNT: 14.8 % (ref 11–15)
GLOBULIN PLAS-MCNC: 5.8 G/DL (ref 2.8–4.4)
GLUCOSE BLD-MCNC: 141 MG/DL (ref 70–99)
GLUCOSE BLD-MCNC: 151 MG/DL (ref 70–99)
GLUCOSE BLD-MCNC: 152 MG/DL (ref 70–99)
HCT VFR BLD AUTO: 42.7 % (ref 39–53)
HCT VFR BLD AUTO: 42.7 % (ref 39–53)
HGB BLD-MCNC: 13.4 G/DL (ref 13–17.5)
HGB BLD-MCNC: 13.4 G/DL (ref 13–17.5)
IMM GRANULOCYTES # BLD AUTO: 0.03 X10(3) UL (ref 0–1)
IMM GRANULOCYTES NFR BLD: 0.4 %
INR BLD: 1.1 (ref 0.9–1.1)
LYMPHOCYTES # BLD AUTO: 1.47 X10(3) UL (ref 1–4)
LYMPHOCYTES NFR BLD AUTO: 21 %
M PROTEIN MFR SERPL ELPH: 8.1 G/DL (ref 6.4–8.2)
MCH RBC QN AUTO: 32.4 PG (ref 26–34)
MCH RBC QN AUTO: 32.4 PG (ref 26–34)
MCHC RBC AUTO-ENTMCNC: 31.4 G/DL (ref 31–37)
MCHC RBC AUTO-ENTMCNC: 31.4 G/DL (ref 31–37)
MCV RBC AUTO: 103.1 FL (ref 80–100)
MCV RBC AUTO: 103.1 FL (ref 80–100)
MONOCYTES # BLD AUTO: 0.51 X10(3) UL (ref 0.1–1)
MONOCYTES NFR BLD AUTO: 7.3 %
NEUTROPHILS # BLD AUTO: 4.79 X10 (3) UL (ref 1.5–7.7)
NEUTROPHILS # BLD AUTO: 4.79 X10(3) UL (ref 1.5–7.7)
NEUTROPHILS NFR BLD AUTO: 68.3 %
OSMOLALITY SERPL CALC.SUM OF ELEC: 288 MOSM/KG (ref 275–295)
PATIENT FASTING Y/N/NP: YES
PLATELET # BLD AUTO: 200 10(3)UL (ref 150–450)
PLATELET # BLD AUTO: 200 10(3)UL (ref 150–450)
POTASSIUM SERPL-SCNC: 3.9 MMOL/L (ref 3.5–5.1)
PSA SERPL DL<=0.01 NG/ML-MCNC: 14.7 SECONDS (ref 12.5–14.7)
RBC # BLD AUTO: 4.14 X10(6)UL (ref 3.8–5.8)
RBC # BLD AUTO: 4.14 X10(6)UL (ref 3.8–5.8)
SODIUM SERPL-SCNC: 138 MMOL/L (ref 136–145)
WBC # BLD AUTO: 7 X10(3) UL (ref 4–11)
WBC # BLD AUTO: 7 X10(3) UL (ref 4–11)

## 2020-02-19 PROCEDURE — 85027 COMPLETE CBC AUTOMATED: CPT

## 2020-02-19 PROCEDURE — 85610 PROTHROMBIN TIME: CPT

## 2020-02-19 PROCEDURE — 99153 MOD SED SAME PHYS/QHP EA: CPT | Performed by: INTERNAL MEDICINE

## 2020-02-19 PROCEDURE — 82378 CARCINOEMBRYONIC ANTIGEN: CPT

## 2020-02-19 PROCEDURE — 99152 MOD SED SAME PHYS/QHP 5/>YRS: CPT | Performed by: INTERNAL MEDICINE

## 2020-02-19 PROCEDURE — 86301 IMMUNOASSAY TUMOR CA 19-9: CPT

## 2020-02-19 PROCEDURE — 64530 N BLOCK INJ CELIAC PELUS: CPT | Performed by: INTERNAL MEDICINE

## 2020-02-19 PROCEDURE — 82962 GLUCOSE BLOOD TEST: CPT

## 2020-02-19 PROCEDURE — 36415 COLL VENOUS BLD VENIPUNCTURE: CPT

## 2020-02-19 PROCEDURE — 77012 CT SCAN FOR NEEDLE BIOPSY: CPT | Performed by: INTERNAL MEDICINE

## 2020-02-19 PROCEDURE — 85025 COMPLETE CBC W/AUTO DIFF WBC: CPT

## 2020-02-19 PROCEDURE — 80053 COMPREHEN METABOLIC PANEL: CPT

## 2020-02-19 RX ORDER — FLUMAZENIL 0.1 MG/ML
0.2 INJECTION, SOLUTION INTRAVENOUS AS NEEDED
Status: DISCONTINUED | OUTPATIENT
Start: 2020-02-19 | End: 2020-02-21

## 2020-02-19 RX ORDER — MIDAZOLAM HYDROCHLORIDE 1 MG/ML
INJECTION INTRAMUSCULAR; INTRAVENOUS
Status: COMPLETED
Start: 2020-02-19 | End: 2020-02-19

## 2020-02-19 RX ORDER — ACETAMINOPHEN 325 MG/1
650 TABLET ORAL EVERY 4 HOURS PRN
Status: DISCONTINUED | OUTPATIENT
Start: 2020-02-19 | End: 2020-02-21

## 2020-02-19 RX ORDER — ALCOHOL 0.98 ML/ML
INJECTION INTRASPINAL
Status: COMPLETED
Start: 2020-02-19 | End: 2020-02-19

## 2020-02-19 RX ORDER — SODIUM CHLORIDE 9 MG/ML
INJECTION, SOLUTION INTRAVENOUS CONTINUOUS
Status: DISCONTINUED | OUTPATIENT
Start: 2020-02-19 | End: 2020-02-21

## 2020-02-19 RX ORDER — HYDROCODONE BITARTRATE AND ACETAMINOPHEN 5; 325 MG/1; MG/1
1 TABLET ORAL EVERY 4 HOURS PRN
Status: DISCONTINUED | OUTPATIENT
Start: 2020-02-19 | End: 2020-02-21

## 2020-02-19 RX ORDER — NALOXONE HYDROCHLORIDE 0.4 MG/ML
80 INJECTION, SOLUTION INTRAMUSCULAR; INTRAVENOUS; SUBCUTANEOUS AS NEEDED
Status: DISCONTINUED | OUTPATIENT
Start: 2020-02-19 | End: 2020-02-21

## 2020-02-19 RX ORDER — MIDAZOLAM HYDROCHLORIDE 1 MG/ML
1 INJECTION INTRAMUSCULAR; INTRAVENOUS EVERY 5 MIN PRN
Status: ACTIVE | OUTPATIENT
Start: 2020-02-19 | End: 2020-02-19

## 2020-02-19 RX ORDER — HYDROCODONE BITARTRATE AND ACETAMINOPHEN 5; 325 MG/1; MG/1
2 TABLET ORAL EVERY 4 HOURS PRN
Status: DISCONTINUED | OUTPATIENT
Start: 2020-02-19 | End: 2020-02-21

## 2020-02-19 RX ADMIN — SODIUM CHLORIDE 50 ML: 9 INJECTION, SOLUTION INTRAVENOUS at 11:55:00

## 2020-02-19 RX ADMIN — ALCOHOL 5 ML: 0.98 INJECTION INTRASPINAL at 12:15:00

## 2020-02-19 RX ADMIN — MIDAZOLAM HYDROCHLORIDE 1 MG: 1 INJECTION INTRAMUSCULAR; INTRAVENOUS at 12:05:00

## 2020-02-19 RX ADMIN — ALCOHOL 20 ML: 0.98 INJECTION INTRASPINAL at 12:15:00

## 2020-02-19 RX ADMIN — MIDAZOLAM HYDROCHLORIDE 1 MG: 1 INJECTION INTRAMUSCULAR; INTRAVENOUS at 11:55:00

## 2020-02-19 NOTE — TELEPHONE ENCOUNTER
Myra Doty, pt's daughter calling with questions re: administration of pt's Norco and Fentanyl Patch. States pt had celiac plexus block today.  Daughter wanting to know if new Fentanyl Patch should be put on pt and how often should pt take [address]

## 2020-02-19 NOTE — IMAGING NOTE
The Patient arrived to CT with HIS Daughter for a Celiac Plexus Block due to Pancreatic Cancer. AN IV is located int the Right Hand.  The Patient's affect is good and conversational.  Doctor Aleah Espinoza consented patient for the procedure, and all questions wer

## 2020-02-19 NOTE — TELEPHONE ENCOUNTER
Spoke to daughter about pain meds post block. Asked them to put patch back on and to use the norco PRN.   PATRICIO Pandya

## 2020-02-19 NOTE — PROCEDURES
[facility]  Procedure Note    Ida Vega Patient Status:  Outpatient    11/3/1940 MRN [id number]   [facility] Attending Taj Oneal MD   Hosp Day # 0 PCP Ani Giron MD     Procedure: celiac ganglion block    Pre-Pr

## 2020-02-20 ENCOUNTER — APPOINTMENT (OUTPATIENT)
Dept: HEMATOLOGY/ONCOLOGY | Age: 80
End: 2020-02-20
Attending: INTERNAL MEDICINE
Payer: MEDICARE

## 2020-02-20 ENCOUNTER — TELEPHONE (OUTPATIENT)
Dept: CT IMAGING | Facility: HOSPITAL | Age: 80
End: 2020-02-20

## 2020-02-20 ENCOUNTER — TELEPHONE (OUTPATIENT)
Dept: HEMATOLOGY/ONCOLOGY | Facility: HOSPITAL | Age: 80
End: 2020-02-20

## 2020-02-20 NOTE — TELEPHONE ENCOUNTER
Received call from Ann Chaney, pt's daughter stating pt is c/o \"aching\" mid/right-sided CP since this AM. SP celiac plexus block in radiology yesterday. Daughter states pt refuses pain relieving medication. Pt is ambulating throughout day.  +eating/drinking

## 2020-02-20 NOTE — TELEPHONE ENCOUNTER
Daughter of pt calls today stating pt is c/o chest pain SP procedure in radiology yesterday. Explained we cannot assess pt over the phone and if chest pain is his main complaint he should be seen by a physician and ER is best option.  Pt verbalized Fiona

## 2020-02-27 ENCOUNTER — OFFICE VISIT (OUTPATIENT)
Dept: HEMATOLOGY/ONCOLOGY | Age: 80
End: 2020-02-27
Attending: INTERNAL MEDICINE
Payer: MEDICARE

## 2020-02-27 VITALS
BODY MASS INDEX: 25.73 KG/M2 | HEIGHT: 72.09 IN | DIASTOLIC BLOOD PRESSURE: 90 MMHG | WEIGHT: 190 LBS | OXYGEN SATURATION: 95 % | TEMPERATURE: 99 F | RESPIRATION RATE: 18 BRPM | HEART RATE: 83 BPM | SYSTOLIC BLOOD PRESSURE: 153 MMHG

## 2020-02-27 DIAGNOSIS — G89.3 NEOPLASM RELATED PAIN: ICD-10-CM

## 2020-02-27 DIAGNOSIS — C78.7 SECONDARY ADENOCARCINOMA OF LIVER (HCC): ICD-10-CM

## 2020-02-27 DIAGNOSIS — C25.0 MALIGNANT NEOPLASM OF HEAD OF PANCREAS (HCC): Primary | ICD-10-CM

## 2020-02-27 DIAGNOSIS — F41.9 ANXIETY: ICD-10-CM

## 2020-02-27 LAB
ALBUMIN SERPL-MCNC: 2.4 G/DL (ref 3.4–5)
ALBUMIN/GLOB SERPL: 0.4 {RATIO} (ref 1–2)
ALP LIVER SERPL-CCNC: 248 U/L (ref 45–117)
ALT SERPL-CCNC: 29 U/L (ref 16–61)
ANION GAP SERPL CALC-SCNC: 5 MMOL/L (ref 0–18)
AST SERPL-CCNC: 25 U/L (ref 15–37)
BASOPHILS # BLD AUTO: 0.03 X10(3) UL (ref 0–0.2)
BASOPHILS NFR BLD AUTO: 0.4 %
BILIRUB SERPL-MCNC: 0.5 MG/DL (ref 0.1–2)
BUN BLD-MCNC: 9 MG/DL (ref 7–18)
BUN/CREAT SERPL: 8.8 (ref 10–20)
CALCIUM BLD-MCNC: 8.9 MG/DL (ref 8.5–10.1)
CANCER AG19-9 SERPL-ACNC: 2067.7 U/ML (ref ?–37)
CHLORIDE SERPL-SCNC: 102 MMOL/L (ref 98–112)
CO2 SERPL-SCNC: 31 MMOL/L (ref 21–32)
CREAT BLD-MCNC: 1.02 MG/DL (ref 0.7–1.3)
DEPRECATED RDW RBC AUTO: 54.6 FL (ref 35.1–46.3)
EOSINOPHIL # BLD AUTO: 0.22 X10(3) UL (ref 0–0.7)
EOSINOPHIL NFR BLD AUTO: 3.3 %
ERYTHROCYTE [DISTWIDTH] IN BLOOD BY AUTOMATED COUNT: 14.5 % (ref 11–15)
GLOBULIN PLAS-MCNC: 5.5 G/DL (ref 2.8–4.4)
GLUCOSE BLD-MCNC: 132 MG/DL (ref 70–99)
HCT VFR BLD AUTO: 42.1 % (ref 39–53)
HGB BLD-MCNC: 13.2 G/DL (ref 13–17.5)
IMM GRANULOCYTES # BLD AUTO: 0.01 X10(3) UL (ref 0–1)
IMM GRANULOCYTES NFR BLD: 0.1 %
LYMPHOCYTES # BLD AUTO: 1.82 X10(3) UL (ref 1–4)
LYMPHOCYTES NFR BLD AUTO: 27.1 %
M PROTEIN MFR SERPL ELPH: 7.9 G/DL (ref 6.4–8.2)
MCH RBC QN AUTO: 31.7 PG (ref 26–34)
MCHC RBC AUTO-ENTMCNC: 31.4 G/DL (ref 31–37)
MCV RBC AUTO: 101.2 FL (ref 80–100)
MONOCYTES # BLD AUTO: 0.61 X10(3) UL (ref 0.1–1)
MONOCYTES NFR BLD AUTO: 9.1 %
NEUTROPHILS # BLD AUTO: 4.02 X10 (3) UL (ref 1.5–7.7)
NEUTROPHILS # BLD AUTO: 4.02 X10(3) UL (ref 1.5–7.7)
NEUTROPHILS NFR BLD AUTO: 60 %
OSMOLALITY SERPL CALC.SUM OF ELEC: 287 MOSM/KG (ref 275–295)
PATIENT FASTING Y/N/NP: NO
PLATELET # BLD AUTO: 235 10(3)UL (ref 150–450)
POTASSIUM SERPL-SCNC: 3.2 MMOL/L (ref 3.5–5.1)
RBC # BLD AUTO: 4.16 X10(6)UL (ref 3.8–5.8)
SODIUM SERPL-SCNC: 138 MMOL/L (ref 136–145)
WBC # BLD AUTO: 6.7 X10(3) UL (ref 4–11)

## 2020-02-27 PROCEDURE — 99214 OFFICE O/P EST MOD 30 MIN: CPT | Performed by: INTERNAL MEDICINE

## 2020-02-27 RX ORDER — HYDROMORPHONE HYDROCHLORIDE 2 MG/1
TABLET ORAL EVERY 4 HOURS PRN
Qty: 90 TABLET | Refills: 0 | Status: SHIPPED | OUTPATIENT
Start: 2020-02-27 | End: 2020-02-28

## 2020-02-27 RX ORDER — FENTANYL 50 UG/H
1 PATCH TRANSDERMAL
Qty: 10 PATCH | Refills: 0 | Status: SHIPPED | OUTPATIENT
Start: 2020-02-27 | End: 2020-03-12

## 2020-02-27 NOTE — PROGRESS NOTES
Spent 20 min talking with daughter. She felt the Effexor 150mg BID was too much and causing dizziness so only is doing daily now. BP has been elevated at home 091-716 systolic and 95'E diastolic, but lisinopril has been d/c, pt also having headaches.  For a

## 2020-02-28 ENCOUNTER — TELEPHONE (OUTPATIENT)
Dept: HEMATOLOGY/ONCOLOGY | Facility: HOSPITAL | Age: 80
End: 2020-02-28

## 2020-02-28 RX ORDER — POTASSIUM CHLORIDE 750 MG/1
20 TABLET, EXTENDED RELEASE ORAL DAILY
Qty: 60 TABLET | Refills: 5 | Status: SHIPPED | OUTPATIENT
Start: 2020-02-28

## 2020-02-28 NOTE — TELEPHONE ENCOUNTER
Lainey Chavez the pharmacist from [facility] called about two prescriptions Dr Portia Boyd prescribed yesterday. 1.   Venlafaxine 150 mg (1 cap po BID)  The insurance will only pay for 1 cap per day without a preauthorization.   2.   Hydromorphone 2mg (1-2 tabs po Q4 pr

## 2020-03-02 RX ORDER — LISINOPRIL 20 MG/1
20 TABLET ORAL DAILY
Qty: 30 TABLET | Refills: 3 | Status: SHIPPED | OUTPATIENT
Start: 2020-03-02

## 2020-03-03 ENCOUNTER — SOCIAL WORK SERVICES (OUTPATIENT)
Dept: HEMATOLOGY/ONCOLOGY | Facility: HOSPITAL | Age: 80
End: 2020-03-03

## 2020-03-03 ENCOUNTER — TELEPHONE (OUTPATIENT)
Dept: HEMATOLOGY/ONCOLOGY | Facility: HOSPITAL | Age: 80
End: 2020-03-03

## 2020-03-03 DIAGNOSIS — C25.0 MALIGNANT NEOPLASM OF HEAD OF PANCREAS (HCC): Primary | ICD-10-CM

## 2020-03-03 DIAGNOSIS — R09.02 HYPOXIA: ICD-10-CM

## 2020-03-03 NOTE — TELEPHONE ENCOUNTER
Patient had Home nocturnal oxygen testing. He desaturated on multiple occasions to pO2 of 60-70%. Spent most of the time in the 80's during the night. Needs to be on 2 L NC overnight.   Kristi Wilkerson MD

## 2020-03-03 NOTE — PROGRESS NOTES
LIBIA spoke with Sandro Putnam, about referral and faxed over pulse ox study and order for home O2. Santi Mack stated she would check and call LIBIA back. Santi Mack did report that physician note with eligibility would be needed.

## 2020-03-03 NOTE — PROGRESS NOTES
Sw received return call from Guru Diehl, who reported that patient is not eligible for home o2 at this time based on not having a qualifying lung disease and only having the pulse ox test completed.  She reports that patient would need a sleep stud

## 2020-03-04 ENCOUNTER — SOCIAL WORK SERVICES (OUTPATIENT)
Dept: HEMATOLOGY/ONCOLOGY | Facility: HOSPITAL | Age: 80
End: 2020-03-04

## 2020-03-04 DIAGNOSIS — C25.0 MALIGNANT NEOPLASM OF HEAD OF PANCREAS (HCC): Primary | ICD-10-CM

## 2020-03-09 ENCOUNTER — OFFICE VISIT (OUTPATIENT)
Dept: HEMATOLOGY/ONCOLOGY | Age: 80
End: 2020-03-09
Attending: INTERNAL MEDICINE
Payer: MEDICARE

## 2020-03-09 ENCOUNTER — SOCIAL WORK SERVICES (OUTPATIENT)
Dept: HEMATOLOGY/ONCOLOGY | Facility: HOSPITAL | Age: 80
End: 2020-03-09

## 2020-03-09 VITALS
RESPIRATION RATE: 18 BRPM | BODY MASS INDEX: 25 KG/M2 | OXYGEN SATURATION: 93 % | WEIGHT: 186.19 LBS | TEMPERATURE: 99 F | DIASTOLIC BLOOD PRESSURE: 71 MMHG | SYSTOLIC BLOOD PRESSURE: 109 MMHG | HEART RATE: 81 BPM

## 2020-03-09 DIAGNOSIS — R73.9 HYPERGLYCEMIA: Primary | ICD-10-CM

## 2020-03-09 DIAGNOSIS — C78.7 SECONDARY ADENOCARCINOMA OF LIVER (HCC): ICD-10-CM

## 2020-03-09 DIAGNOSIS — K86.89 PANCREATIC MASS: ICD-10-CM

## 2020-03-09 DIAGNOSIS — E87.6 HYPOKALEMIA: ICD-10-CM

## 2020-03-09 DIAGNOSIS — C25.0 MALIGNANT NEOPLASM OF HEAD OF PANCREAS (HCC): ICD-10-CM

## 2020-03-09 LAB
ANION GAP SERPL CALC-SCNC: 6 MMOL/L (ref 0–18)
BUN BLD-MCNC: 10 MG/DL (ref 7–18)
BUN/CREAT SERPL: 9.6 (ref 10–20)
CALCIUM BLD-MCNC: 9.4 MG/DL (ref 8.5–10.1)
CHLORIDE SERPL-SCNC: 107 MMOL/L (ref 98–112)
CO2 SERPL-SCNC: 28 MMOL/L (ref 21–32)
CREAT BLD-MCNC: 1.04 MG/DL (ref 0.7–1.3)
GLUCOSE BLD-MCNC: 108 MG/DL (ref 70–99)
OSMOLALITY SERPL CALC.SUM OF ELEC: 292 MOSM/KG (ref 275–295)
PATIENT FASTING Y/N/NP: NO
POTASSIUM SERPL-SCNC: 4.4 MMOL/L (ref 3.5–5.1)
SODIUM SERPL-SCNC: 141 MMOL/L (ref 136–145)

## 2020-03-09 PROCEDURE — 96361 HYDRATE IV INFUSION ADD-ON: CPT

## 2020-03-09 PROCEDURE — 96360 HYDRATION IV INFUSION INIT: CPT

## 2020-03-09 PROCEDURE — 80048 BASIC METABOLIC PNL TOTAL CA: CPT

## 2020-03-09 RX ORDER — HYDROMORPHONE HYDROCHLORIDE 2 MG/1
4 TABLET ORAL ONCE
Status: COMPLETED | OUTPATIENT
Start: 2020-03-09 | End: 2020-03-09

## 2020-03-09 RX ORDER — HYDROMORPHONE HYDROCHLORIDE 2 MG/1
4 TABLET ORAL ONCE
Status: CANCELLED
Start: 2020-03-09

## 2020-03-09 RX ORDER — OLANZAPINE 5 MG/1
5 TABLET ORAL NIGHTLY
Qty: 30 TABLET | Refills: 0 | Status: SHIPPED | OUTPATIENT
Start: 2020-03-09 | End: 2020-04-07

## 2020-03-09 RX ADMIN — HYDROMORPHONE HYDROCHLORIDE 4 MG: 2 TABLET ORAL at 15:09:00

## 2020-03-10 NOTE — PROGRESS NOTES
LAVERNE called and spoke with Merlene Aparicio, [facility] hospice, to discuss patient eligibility for O2. She reports that family has not yet been seen by NP--they requested an appointment for 3- and it will be discussed at that time.  Laverne explained situation wi

## 2020-03-12 ENCOUNTER — SOCIAL WORK SERVICES (OUTPATIENT)
Dept: HEMATOLOGY/ONCOLOGY | Facility: HOSPITAL | Age: 80
End: 2020-03-12

## 2020-03-12 ENCOUNTER — OFFICE VISIT (OUTPATIENT)
Dept: HEMATOLOGY/ONCOLOGY | Age: 80
End: 2020-03-12
Attending: INTERNAL MEDICINE
Payer: MEDICARE

## 2020-03-12 VITALS
SYSTOLIC BLOOD PRESSURE: 130 MMHG | HEART RATE: 106 BPM | WEIGHT: 186.19 LBS | TEMPERATURE: 99 F | RESPIRATION RATE: 18 BRPM | DIASTOLIC BLOOD PRESSURE: 94 MMHG | BODY MASS INDEX: 25 KG/M2 | OXYGEN SATURATION: 92 %

## 2020-03-12 DIAGNOSIS — C25.0 MALIGNANT NEOPLASM OF HEAD OF PANCREAS (HCC): ICD-10-CM

## 2020-03-12 DIAGNOSIS — C78.7 SECONDARY ADENOCARCINOMA OF LIVER (HCC): Primary | ICD-10-CM

## 2020-03-12 DIAGNOSIS — G89.3 NEOPLASM RELATED PAIN: ICD-10-CM

## 2020-03-12 PROCEDURE — 99214 OFFICE O/P EST MOD 30 MIN: CPT | Performed by: INTERNAL MEDICINE

## 2020-03-12 RX ORDER — LACTULOSE 20 G/30ML
20 SOLUTION ORAL 3 TIMES DAILY
Qty: 500 ML | Refills: 3 | Status: SHIPPED | OUTPATIENT
Start: 2020-03-12

## 2020-03-12 RX ORDER — FENTANYL 50 UG/H
1 PATCH TRANSDERMAL
Qty: 10 PATCH | Refills: 0 | Status: SHIPPED | OUTPATIENT
Start: 2020-03-12 | End: 2020-04-27

## 2020-03-12 RX ORDER — LACTULOSE 10 G/15ML
SOLUTION ORAL; RECTAL
Refills: 0 | OUTPATIENT
Start: 2020-03-12

## 2020-03-12 RX ORDER — HYDROMORPHONE HYDROCHLORIDE 2 MG/1
2 TABLET ORAL EVERY 6 HOURS PRN
Qty: 60 TABLET | Refills: 0 | Status: SHIPPED | OUTPATIENT
Start: 2020-03-12 | End: 2020-04-28

## 2020-03-12 NOTE — PROGRESS NOTES
Patient is here for MD f/u for pancreatic cancer. Patient has transitioned to palliative care. Patient is requiring the use of oxygen especially at night but has not received it yet. Pain is controlled with hydromorphone.  He is having more episodes of naus

## 2020-03-12 NOTE — PROGRESS NOTES
Sw called and left message for [facility] and requested a return call to discuss status of home o2. SW was informed yesterday that the O2 was to be delivered yesterday, but patient reports O2 still not in home.

## 2020-03-13 ENCOUNTER — PATIENT MESSAGE (OUTPATIENT)
Dept: HEMATOLOGY/ONCOLOGY | Age: 80
End: 2020-03-13

## 2020-03-13 RX ORDER — ONDANSETRON 4 MG/1
4 TABLET, FILM COATED ORAL EVERY 8 HOURS PRN
Qty: 30 TABLET | Refills: 1 | Status: SHIPPED | OUTPATIENT
Start: 2020-03-13 | End: 2020-04-15

## 2020-03-13 RX ORDER — LEVOTHYROXINE SODIUM 0.1 MG/1
100 TABLET ORAL DAILY
Qty: 30 TABLET | Refills: 1 | Status: SHIPPED | OUTPATIENT
Start: 2020-03-13 | End: 2020-04-15

## 2020-03-13 RX ORDER — LEVOTHYROXINE SODIUM 0.1 MG/1
TABLET ORAL
Qty: 90 TABLET | Refills: 0 | OUTPATIENT
Start: 2020-03-13

## 2020-03-13 NOTE — TELEPHONE ENCOUNTER
From: Liseth Christie  To: Mark Mcgowan MD  Sent: 3/13/2020 7:16 AM CDT  Subject: Prescription Question    Could you please refill dad's following meds at the [facility] on [address].  It wouldn't let me check the box for refill on other

## 2020-03-17 ENCOUNTER — TELEPHONE (OUTPATIENT)
Dept: HEMATOLOGY/ONCOLOGY | Facility: HOSPITAL | Age: 80
End: 2020-03-17

## 2020-03-17 NOTE — TELEPHONE ENCOUNTER
Murrel Apgar from [facility] called regarding a prior auth needed for the patient's medication. He says a fax was sent over, and wanted to follow up and see if this process has been started.  Please call

## 2020-03-18 DIAGNOSIS — C78.7 SECONDARY ADENOCARCINOMA OF LIVER (HCC): ICD-10-CM

## 2020-03-18 DIAGNOSIS — C25.0 MALIGNANT NEOPLASM OF HEAD OF PANCREAS (HCC): Primary | ICD-10-CM

## 2020-03-22 PROBLEM — G89.3 NEOPLASM RELATED PAIN: Status: ACTIVE | Noted: 2020-03-22

## 2020-03-22 NOTE — PROGRESS NOTES
[facility]    PATIENT'S NAME: Devon Pires   ATTENDING PHYSICIAN: Yen Mcgee M.D.    PATIENT ACCOUNT #: [de-identified] LOCATION: [address] [place] [address] RECORD #: [id number] YOB: 1940   DATE OF SERVICE: 03/12/2020       CANCER CE include B complex vitamins; bisacodyl rectal suppositories p.r.n.; calcium and vitamin D; cetirizine p.r.n., docusate sodium p.r.n., fentanyl patch 50 mcg q.72 h.; hydrocodone 10/325, one to 2 tablets q.6 h. p.r.n.; hydromorphone 2 to 4 mg q.4 h. p.r.n.; i and we are essentially providing palliative care here. I will have him back to see us in another 2 to 3 weeks. She is in close contact with us on a regular basis.   We will schedule him for fluids on Monday and if they feel like they do not need it, they

## 2020-03-23 RX ORDER — LEVOTHYROXINE SODIUM 0.1 MG/1
100 TABLET ORAL DAILY
Qty: 30 TABLET | Refills: 1 | OUTPATIENT
Start: 2020-03-23

## 2020-03-25 ENCOUNTER — PATIENT MESSAGE (OUTPATIENT)
Dept: HEMATOLOGY/ONCOLOGY | Age: 80
End: 2020-03-25

## 2020-03-25 ENCOUNTER — TELEPHONE (OUTPATIENT)
Dept: HEMATOLOGY/ONCOLOGY | Facility: HOSPITAL | Age: 80
End: 2020-03-25

## 2020-03-25 RX ORDER — AMOXICILLIN AND CLAVULANATE POTASSIUM 875; 125 MG/1; MG/1
1 TABLET, FILM COATED ORAL 2 TIMES DAILY
Qty: 20 TABLET | Refills: 0 | Status: SHIPPED | OUTPATIENT
Start: 2020-03-25 | End: 2020-03-25 | Stop reason: CLARIF

## 2020-03-25 RX ORDER — CEFUROXIME AXETIL 500 MG/1
500 TABLET ORAL 2 TIMES DAILY
Qty: 20 TABLET | Refills: 0 | Status: SHIPPED | OUTPATIENT
Start: 2020-03-25 | End: 2020-04-04

## 2020-03-25 NOTE — TELEPHONE ENCOUNTER
From: Ford Salinas  To: Yvan Harrell MD  Sent: 3/25/2020 7:37 AM CDT  Subject: Other    Dr. Tim Rosario:  Dad is coughing this morning. ..this has only been isolated a few times during week, but this morning coughing more and phlegm coming up is thick

## 2020-03-25 NOTE — TELEPHONE ENCOUNTER
[facility] calling regarding a prescription for Murphy. He was given a prescription for   Augmentin and pharmacy wants to make you aware he is allergic to Penicillin.  Please call My @ [phone]

## 2020-03-25 NOTE — TELEPHONE ENCOUNTER
Daughter called back and after much thought and discussion with patient. He had an allergic reaction to Penicillin years ago. He is unsure of the type of reaction he had. Augmentin Rx cancelled. Await further orders from Dr Schreiber Dears.

## 2020-03-25 NOTE — TELEPHONE ENCOUNTER
Per Dr Paco Nance, give Ceftin 500 mg twice daily for 10 days. Small chance of cross reactivity with PCN. Pharmacy notified.

## 2020-03-25 NOTE — TELEPHONE ENCOUNTER
Confirmed with patient and daughter Illene Carrel, patient is NOT allergic to penicillin only to Clindamycin. Proceed with Augmentin. Pharmacy notified of this.

## 2020-03-28 NOTE — PROGRESS NOTES
[facility]    PATIENT'S NAME: Elen Alaniz   ATTENDING PHYSICIAN: Sosa Chavez M.D.    PATIENT ACCOUNT #: [de-identified] LOCATION: [address] [place] [address] RECORD #: [id number] YOB: 1940   DATE OF SERVICE: 02/27/2020       CANCER CE upper quadrant and feels somewhat dull. He had an increase in his fentanyl patch, and his daughter is worried that this is causing more confusion. He did see Dr. Chel Beaulieu earlier today.   His daughter has been adjusting his insulin based on his variable gl palliative and they understand this. We have talked again about the differences between home health, hospice and palliative care. Essentially, we are providing him outpatient palliative care at present.   We have cut back his antidepressant because of let

## 2020-03-30 NOTE — PROGRESS NOTES
[facility]    PATIENT'S NAME: Olga Hendrix   ATTENDING PHYSICIAN: Mark Mcgowan M.D.    PATIENT ACCOUNT #: [de-identified] LOCATION: [address] [place] [address] RECORD #: [id number] YOB: 1940   DATE OF SERVICE: 08/13/2019       CANCER CE Normal.   ABDOMEN:  No hepatosplenomegaly or tenderness. EXTREMITIES:  He has no clubbing, cyanosis, or edema. LABORATORY DATA:  His alkaline phosphatase is normal.  His bilirubin is normal.  His glucose is 295, BUN and creatinine are 26 and 1.97.

## 2020-04-07 RX ORDER — OLANZAPINE 5 MG/1
TABLET ORAL
Qty: 30 TABLET | Refills: 5 | Status: SHIPPED | OUTPATIENT
Start: 2020-04-07

## 2020-04-09 ENCOUNTER — APPOINTMENT (OUTPATIENT)
Dept: HEMATOLOGY/ONCOLOGY | Facility: HOSPITAL | Age: 80
End: 2020-04-09
Attending: INTERNAL MEDICINE
Payer: MEDICARE

## 2020-04-09 ENCOUNTER — PATIENT MESSAGE (OUTPATIENT)
Dept: HEMATOLOGY/ONCOLOGY | Facility: HOSPITAL | Age: 80
End: 2020-04-09

## 2020-04-09 ENCOUNTER — APPOINTMENT (OUTPATIENT)
Dept: HEMATOLOGY/ONCOLOGY | Age: 80
End: 2020-04-09
Attending: INTERNAL MEDICINE
Payer: MEDICARE

## 2020-04-09 NOTE — TELEPHONE ENCOUNTER
From: Sania Martini  To: Sharlene Mata MD  Sent: 4/9/2020 9:10 AM CDT  Subject: Prescription Question    Dr. Gayatri Lebron:  Could you give me a call this morning. ...or Amy Cooper regarding something different that happened to dad yesterday afternoon/evening/o

## 2020-04-09 NOTE — TELEPHONE ENCOUNTER
Patient was treated for the flu a few weeks ago. During that time, he didn't have much pain and was nearly weaned off meds. Patient was due to have patch (50 mcg) changed yesterday. After the change, daughter noticed a change in his mental status.  He has b

## 2020-04-13 ENCOUNTER — TELEPHONE (OUTPATIENT)
Dept: HEMATOLOGY/ONCOLOGY | Facility: HOSPITAL | Age: 80
End: 2020-04-13

## 2020-04-13 NOTE — TELEPHONE ENCOUNTER
Patient is not taking Lactulose \"hates it\" . Used Miralax for 2 days, small amounts of stool. Stopped after 2 days because he was going every half hour. Been off Miralax for 3 days - still has stools every half hour around the clock, small amounts.  Not

## 2020-04-14 ENCOUNTER — TELEPHONE (OUTPATIENT)
Dept: HEMATOLOGY/ONCOLOGY | Facility: HOSPITAL | Age: 80
End: 2020-04-14

## 2020-04-14 NOTE — TELEPHONE ENCOUNTER
Spoke to patient's daughter. Having pasty stools every half hour. Started after a period of constipation and initiated by miralax which they discontinued three days ago.   Pasty stools have continued - from as little as a tablespoon to as much as a 1/4 cu

## 2020-04-15 RX ORDER — LEVOTHYROXINE SODIUM 0.1 MG/1
100 TABLET ORAL DAILY
Qty: 30 TABLET | Refills: 0 | Status: SHIPPED | OUTPATIENT
Start: 2020-04-15

## 2020-04-15 RX ORDER — ONDANSETRON 4 MG/1
4 TABLET, FILM COATED ORAL EVERY 8 HOURS PRN
Qty: 30 TABLET | Refills: 0 | Status: SHIPPED | OUTPATIENT
Start: 2020-04-15 | End: 2020-04-25

## 2020-04-17 RX ORDER — ALPRAZOLAM 0.5 MG/1
TABLET ORAL
Qty: 30 TABLET | Refills: 0 | OUTPATIENT
Start: 2020-04-17

## 2020-04-27 ENCOUNTER — SOCIAL WORK SERVICES (OUTPATIENT)
Dept: HEMATOLOGY/ONCOLOGY | Facility: HOSPITAL | Age: 80
End: 2020-04-27

## 2020-04-27 ENCOUNTER — TELEPHONE (OUTPATIENT)
Dept: HEMATOLOGY/ONCOLOGY | Facility: HOSPITAL | Age: 80
End: 2020-04-27

## 2020-04-27 DIAGNOSIS — C25.0 MALIGNANT NEOPLASM OF HEAD OF PANCREAS (HCC): Primary | ICD-10-CM

## 2020-04-27 RX ORDER — SUCRALFATE 1 G/1
1 TABLET ORAL
Qty: 120 TABLET | Refills: 2 | Status: SHIPPED | OUTPATIENT
Start: 2020-04-27

## 2020-04-27 RX ORDER — ONDANSETRON 4 MG/1
4 TABLET, FILM COATED ORAL EVERY 8 HOURS PRN
Qty: 30 TABLET | Refills: 0 | Status: SHIPPED | OUTPATIENT
Start: 2020-04-27

## 2020-04-27 NOTE — TELEPHONE ENCOUNTER
Spoke to patient's daughter. Taking a turn for the worse. Weaker, intermittent vomiting with some dark vomitus. Low grade fever - none higher than 99.5. Confusion every night. Clears during day and taking ny little in PO.   I recommended starting hosp

## 2020-04-28 RX ORDER — PANTOPRAZOLE SODIUM 40 MG/1
40 TABLET, DELAYED RELEASE ORAL DAILY
Qty: 30 TABLET | Refills: 0 | Status: SHIPPED | OUTPATIENT
Start: 2020-04-28

## 2020-04-28 RX ORDER — HYDROMORPHONE HYDROCHLORIDE 2 MG/1
2 TABLET ORAL EVERY 6 HOURS PRN
Qty: 60 TABLET | Refills: 0 | Status: SHIPPED | OUTPATIENT
Start: 2020-04-28

## 2020-05-01 RX ORDER — SULFAMETHOXAZOLE AND TRIMETHOPRIM 200; 40 MG/5ML; MG/5ML
160 SUSPENSION ORAL 2 TIMES DAILY
Qty: 280 ML | Refills: 0 | Status: SHIPPED | OUTPATIENT
Start: 2020-05-01

## 2020-05-04 ENCOUNTER — TELEPHONE (OUTPATIENT)
Dept: HEMATOLOGY/ONCOLOGY | Facility: HOSPITAL | Age: 80
End: 2020-05-04

## 2020-05-04 NOTE — TELEPHONE ENCOUNTER
Spoke to daughter Alicia De La Garza - passed away peacefully on Friday the 1st in the evening. Appreciative of our efforts. Passed on our condolences.   PATRICIO Pandya

## 2021-01-27 DIAGNOSIS — Z23 NEED FOR VACCINATION: ICD-10-CM

## 2021-10-30 NOTE — OPERATIVE REPORT
Received call from   [facility] with [employer] Complaint. Brief description of triage:  64 y/o with chills for  9 days  Temp 98 pt reports he is not able to get warm per caregiver and not eating like he normaly does pt alert and oriented no complaints of pain or trouble breathing        Triage indicates for patient to see by pcp  In 3 days     Care advice provided, patient verbalizes understanding; denies any other questions or concerns; instructed to call back for any new or worsening symptoms. Writer provided warm transfer to Luis Siegel   at [facility] for appointment scheduling. Attention Provider: Thank you for allowing me to participate in the care of your patient. The patient was connected to triage in response to information provided to the ECC/PSC. Please do not respond through this encounter as the response is not directed to a shared pool. Reason for Disposition   [1] Shivering from cold exposure AND [2] temperature normal   Nursing judgment or information in reference    Answer Assessment - Initial Assessment Questions  1. SYMPTOMS: \"What symptoms are you concerned about? \"      Cold andchills     2. ONSET:  \"When did the symptoms start? \"      9 dayd ago   3. TEMPERATURE: \"What is the temperature? \" \"How was it measured? \"      98  4. COLD EXPOSURE: \"Was there an exposure to cold temperatures? \" (e.g., outside in the snow, swimming in cold water, air conditioning)       5. OTHER SYMPTOMS: Erik Jain there any other symptoms? \" (e.g., fever, weakness, confusion, numbness of fingers or toes)       6. PREGNANCY: \"Is there any chance you are pregnant? \" \"When was your last menstrual period? \"    Answer Assessment - Initial Assessment Questions  1. REASON FOR CALL: \"What is your main concern right now? \"      Chills  2. ONSET: \"When did the *No Answer* start? \"     9 days     3. SEVERITY: \"How bad is the *No Answer*? \"      Pt has been having chills  Daily     4.  FEVER: \"Do you have a [place] Patient Status:  Observation    11/3/1940 MRN [id number]   [facility] ENDOSCOPY Attending Leonid Salgado MD   [facility] Day # 0 PCP Tarah [place] MD     PREOPERATIVE DIAGNOSIS/INDICATION: Pancreatic head cancer, biliary o fever?\"     No     5. OTHER SYMPTOMS: \"Do you have any other new symptoms? \"      Not eating as much as ususal     6. INTERVENTIONS AND RESPONSE: \"What have you done so far to try to make this better? What medications have you used? \"     Blankets and sweaters     7. PREGNANCY: \"Is there any chance you are pregnant? \"     n/a    Protocols used: COLD EXPOSURE (HYPOTHERMIA)-ADULT-, NO GUIDELINE AVAILABLE-ADULT- the CBD was cannulated using a standard 0.035 [place] scientific  Balloon extraction catheter, deep cannulation was performed with the help of a 0.035 straight Hydra jagwire 260cm in length.  We placed a biliary Cotton-Obando plastic stent size 10 Fr x 5 cm s

## 2024-10-17 NOTE — PROGRESS NOTES
11/29/19 1316   Clinical Encounter Type   Visited With Patient and family together   Routine Visit   (Vanessa Davis provided prayer, Scripture, support and SOS for the pt and his family) None

## 2025-04-16 NOTE — PROGRESS NOTES
BRIEF NUTRITION NOTE:   Per RN, pt daughter declined RD services at this time. RD available prn.
Hpi Title: Evaluation of Skin Lesions
Additional History: Patient presents for full body skin exam.

## (undated) DEVICE — RETRIEVAL BALLOON CATHETER: Brand: EXTRACTOR™ PRO RX

## (undated) DEVICE — Device: Brand: DEFENDO AIR/WATER/SUCTION AND BIOPSY VALVE

## (undated) DEVICE — SPHINCTEROTOME: Brand: HYDRATOME RX 44

## (undated) DEVICE — HYDRA JAGWIRE

## (undated) DEVICE — 3M™ RED DOT™ MONITORING ELECTRODE WITH FOAM TAPE AND STICKY GEL, 50/BAG, 20/CASE, 72/PLT 2570: Brand: RED DOT™

## (undated) DEVICE — REM POLYHESIVE ADULT PATIENT RETURN ELECTRODE: Brand: VALLEYLAB

## (undated) DEVICE — LOCKING DEVICE RX & BIOPSY CAP

## (undated) DEVICE — FILTERLINE NASAL ADULT O2/CO2

## (undated) DEVICE — BOWLS UTILITY 16OZ

## (undated) DEVICE — SNARE CAPTIFLEX STD OVAL OLY

## (undated) DEVICE — SYRINGE 10ML LL TIP

## (undated) DEVICE — DEVICE SPEC RTRVL RPTR 2.4MM

## (undated) DEVICE — 1200CC GUARDIAN II: Brand: GUARDIAN

## (undated) DEVICE — SYRINGE 50ML LL TIP

## (undated) DEVICE — ENDOSCOPY PACK UPPER: Brand: MEDLINE INDUSTRIES, INC.

## (undated) DEVICE — DEVICE SPCMN RTRVL RAPTOR MINI

## (undated) DEVICE — CLIP RESOLUTION 235CM

## (undated) DEVICE — SNARE CAPTIFLEX MICRO-OVL OLY

## (undated) DEVICE — Device

## (undated) DEVICE — PUSHING CATHETER: Brand: COOK

## (undated) DEVICE — BILIARY STENT WITH NAVIFLEXTM RX DELIVERY SYSTEM
Type: IMPLANTABLE DEVICE | Site: BILIARY | Status: NON-FUNCTIONAL
Brand: ADVANIX™ BILIARY

## (undated) NOTE — LETTER
[facility]  Elodia [address]hsell 60 [address] [facility], [address] [place] [address]    Consent for Operation    Date: __________________    Time: _______________    1.  I authorize the performance upon Dl Enrique the following operation:     Endoscopic Retrograde Cholangiop revealed by the pictures or by descriptive texts accompanying them. If the procedure has been videotaped, the surgeon will obtain the original videotape. The hospital will not be responsible for storage or maintenance of this tape.     6. For the purpose of THAT MY DOCTOR PROVIDED ME WITH THE ABOVE EXPLANATIONS, THAT ALL BLANKS OR STATEMENTS REQUIRING INSERTION OR COMPLETION WERE FILLED IN.     Signature of Patient:   ___________________________    When the patient is a minor or mentally incompetent to give co these medicines may increase my risk of anesthetic complications. · If I am allergic to anything or have had a reaction to anesthesia before. 3. I understand how the anesthesia medicine will help me (benefits).     4. I understand that with any type of patient’s representative) and answered their questions. The patient or their representative has agreed to have anesthesia services.     _____________________________________________________________________________  Witness        Date   Time  I have eli

## (undated) NOTE — LETTER
[facility]  Elodia Jensen 60 [address] [facility], [address] [place] [address]    Consent for Operation    Date: __________________    Time: _______________    1.  I authorize the performance upon Ajay Lawson the following operation:    Procedure(s):  ENDOSCOPIC RETROGR revealed by the pictures or by descriptive texts accompanying them. If the procedure has been videotaped, the surgeon will obtain the original videotape. The hospital will not be responsible for storage or maintenance of this tape.     6. For the purpose of THAT MY DOCTOR PROVIDED ME WITH THE ABOVE EXPLANATIONS, THAT ALL BLANKS OR STATEMENTS REQUIRING INSERTION OR COMPLETION WERE FILLED IN.     Signature of Patient:   ___________________________    When the patient is a minor or mentally incompetent to give co · All of the medicines I take (including prescriptions, herbal supplements, and pills I can buy without a prescription (including street drugs/illegal medications).  Failure to inform my anesthesiologist about these medicines may increase my risk of anesthe _____________________________________________________________________________  Anesthesiologist Signature     Date   Time  I have discussed the procedure and information above with the patient (or patient’s representative) and answered their questions.  The

## (undated) NOTE — LETTER
Consent to Procedure/Sedation    Date: 11/27/2019    Time: 1145    1. I authorize the performance upon Desiree Wang the following: central line insertion        2.  I authorize Dr. Yeny Roy (and whomever is designated as the doctor’s assistant), to perform t Witness: ____________________     Date: ______________    Printed: 2019   11:40 AM    Patient Name: Huong Coy        : 11/3/1940       Medical Record #: [id number]

## (undated) NOTE — LETTER
[address] [facility] FOR BLOOD OR BLOOD COMPONENTS      In the course of your treatment, it may become necessary to administer a transfusion of blood or blood components.  This form provides basic information concerning this proc explain the alternatives to you if it has not already been done. I,Murphy Garcia, have read/had read to me the above. I understand the matters bearing on the decision whether or not to authorize a transfusion of blood or blood components.  I have no qu

## (undated) NOTE — LETTER
[facility] Testing Department  Phone: [phone]  Right Fax: [phone]  [facility] 20 By:  Julien Acuna RN Date: 7/26/18    Patient Name: Nancy Reza  Surgery Date: 7/27/2018    CSN: [id number]  Medical Record: [id number]

## (undated) NOTE — LETTER
[facility]  Elodia Jensen 60 99[address] [facility], [address] [place] [address]    Consent for Operation    Date: __________________    Time: _______________    1.  I authorize the performance upon Zehra Emery the following operation:    Procedure(s):  ENDOSCOPIC RETROGR procedure has been videotaped, the surgeon will obtain the original videotape. The hospital will not be responsible for storage or maintenance of this tape.     6. For the purpose of advancing medical education, I consent to the admittance of observers to t STATEMENTS REQUIRING INSERTION OR COMPLETION WERE FILLED IN.     Signature of Patient:   ___________________________    When the patient is a minor or mentally incompetent to give consent:  Signature of person authorized to consent for patient: ____________ supplements, and pills I can buy without a prescription (including street drugs/illegal medications). Failure to inform my anesthesiologist about these medicines may increase my risk of anesthetic complications.   · If I am allergic to anything or have had Anesthesiologist Signature     Date   Time  I have discussed the procedure and information above with the patient (or patient’s representative) and answered their questions. The patient or their representative has agreed to have anesthesia services.     ___

## (undated) NOTE — IP AVS SNAPSHOT
Patient Demographics     Address  [address] [place] [address]  Michelle Age [address] Phone  [phone] (Home) *Preferred*  [phone] [facility]) E-mail Address  [email or web address]      Emergency Contact(s)     Name Relation Home Work Elba Pop Ma Commonly known as:  [address]  Start taking on:  December 11, 2019      Take 1 tablet (1 mg total) by mouth daily. Per Dr. [place] recommendations. RUDDY Oconnor         CALCIUM-VITAMIN D OR      Take 1 tablet by mouth daily.           Ciprofloxacin HCl 75 Take 17 g by mouth daily as needed. RUDDY Holloway         tamsulosin HCl 0.4 MG Caps  Commonly known as:  FLOMAX      Take 1 capsule (0.4 mg total) by mouth daily.    Juliet Agosto MD         Venlafaxine HCl  MG Cp24  Commonly known as: [id number] cefTRIAXone Sodium (ROCEPHIN) 2 g in sodium chloride 0.9% 100 mL MBP/ADD-vantage 12/10/19 0842 New Bag      [id number] docusate sodium (COLACE) cap 100 mg 12/10/19 0841 Given      [id number] hydrocortisone 1 % cream 12/09/19 2100 Given      [id number] Ordering provider:  Leesa Castle MD  12/10/19 1602 Resulting lab:  [facility]    Specimen Information    Type Source Collected On   Body fluid, unspecified — 12/10/19 1523          Components    Component Value Reference Range Flag Lab   Neutrophils Per COMP METABOLIC PANEL (14) [[id number]] (Abnormal)  Resulted: 12/10/19 0631, Result status: Final result   Ordering provider:  Sherra Spurling, MD  12/09/19 2300 Resulting lab:  [facility]    Specimen Information    Type Source Collected On   Blood — 12/10/19 Phosphorus 3.1 2.5 - 4.9 mg/dL — [facility]            CBC WITH DIFFERENTIAL WITH PLATELET [[id number]]  Resulted: 12/10/19 0626, Result status: Final result   Ordering provider:  Darell Conrad MD  12/09/19 2300 Resulting lab:  [facility]   Narrative:   Sulema Barthel Path Comment Other Body Fluid Reactive mesothelial cells, histiocytes, and neutrophils. Reviewed by Rock Ramachandran M.D.  Pathology 12/09/19 at 5:16 PM   — [facility]            Testing Performed By     242Beatrice Lockwood Name Director Address Valid Da Order Status:  Completed Lab Status:  Final result Updated:  11/30/19 1200    Specimen:  Blood,peripheral      Blood Culture Result No Growth 5 Days    Blood Culture FREQ X 2 [[id number]] Collected:  11/25/19 1052    Order Status:  Completed Lab Status:  F Respiratory Panel FLU expanded Once [[id number]]  (Normal) Collected:  11/24/19 1828    Order Status:  Completed Lab Status:  Final result Updated:  11/24/19 1950    Specimen:  Other from Nasopharyngeal swab      Adenovirus PCR: Negative     Coronavirus 22 by her oncologist patient not eating very much had about a half a cup of soup even after that started having nausea and vomiting. Patient started having temperature 99.9 this morning. Patient also has been having a mild headache for the last couple days. • ENDOSCOPIC RETROGRADE CHOLANGIOPANCREATOGRAPHY (ERCP) N/A 7/27/2018    Performed by Janelle Osorio MD at [address] ([facility]) N/A 10/9/2018    Performed by Janelle Osorio MD at [address] ([facility]) total) by mouth daily. , Disp: 30 capsule, Rfl: 5  Lisinopril-hydroCHLOROthiazide 20-12.5 MG Oral Tab, Take 1 tablet by mouth daily. , Disp: , Rfl:   tamsulosin HCl 0.4 MG Oral Cap, Take 1 capsule (0.4 mg total) by mouth daily. , Disp: 90 capsule, Rfl: 3  HUM WBC 6.7   HGB 13.3   MCV 99.3   .0*   INR 1.13*       Recent Labs   Lab 11/24/19  1844   *   BUN 11   CREATSERUM 1.31*   GFRAA 59*   GFRNAA 51*   CA 8.7   ALB 3.1*      K 4.3      CO2 28.0   ALKPHO 167*   *   *   BHUMI Patient will require inpatient services that will reasonably be expected to span two midnight's based on the clinical documentation in H+P. Based on patients current state of illness, I anticipate that, after discharge, patient will require TBD. [RZ.1] Malignant neoplasm of pancreas, unspecified location of malignancy ([address]     Elevated LFTs     Hepatic encephalopathy (HCC)     NEMO (acute kidney injury) ([address]     ATN (acute tubular necrosis) (HCC)     Cholangitis     Peritonitis ([address]       History of CREON 17464-22773 units Oral Cap DR Particles, TAKE 1 CAPSULE BY MOUTH THREE TIMES DAILY WITH EACH MEAL AS DIRECTED, Disp: , Rfl: 1  traMADol HCl 50 MG Oral Tab, Take 50 mg by mouth 4 (four) times daily as needed.   , Disp: , Rfl:   Pantoprazole Sodium 40 M • Type II or unspecified type diabetes mellitus without mention of complication, not stated as uncontrolled    • Unspecified essential hypertension    • Visual impairment     glasses   • Wears glasses       Reviewed:  Past Surgical History:   Procedure Lat increase in ascites particularly within the left suprahepatic space anterior to the lateral segment of the left lobe of the liver. Images were reviewed and discussed. [GS.1]    Lab Results   Component Value Date    WBC 18.0 12/05/2019    HGB 11.5 12/05/201 :  Young Chawla PTA (Physical Therapy Assistant)        PHYSICAL THERAPY TREATMENT NOTE - INPATIENT    Room Number: 332/057-D     Session: 4   Number of Visits to Meet Established Goals: 5    Presenting Problem: Septic Shock     History related • History of blood transfusion 2016   • Irregular bowel habits    • Jaundice    • Kidney stone    • Leg swelling    • Nausea    • Osteoarthritis    • Pain in joints    • Personal history of antineoplastic chemotherapy 09/2018   • PONV (postoperative nausea Weight Bearing Restriction: None                PAIN ASSESSMENT   Ratin  Location: denied  Management Techniques: Activity promotion; Body mechanics;Repositioning    BALANCE Gait: See above flow sheet  Chair Follow: YES  Sit<>Supine: NA  Stand<>Sit: Min A (poor eccentric lowering)    Comments related to Mobility: Pt required extra time for all functional mobility, and willing to work on ambulation in hallway.   Pt requesting to Rehab Potential : Good  Frequency (Obs): 3-5x/week    CURRENT GOALS     Goal #1 Patient is able to demonstrate supine - sit EOB @ level: minimum assistance-met, updated to CGA      Goal #2 Patient is able to demonstrate transfers Sit to/from Stand at [facility] Number of Visits to Meet Established Goals: 5    Presenting Problem: (sepsis, ERCP with stone extraction, ascites)    History related to current admission: Pt is [de-identified]year old male admitted on 11/24/2019 from home with c/o abdominal pain and fever.   Pt diagn Weight Bearing Restriction: None                PAIN ASSESSMENT  Rating: Unable to rate  Location: abdomen  Management Techniques: Activity promotion; Body mechanics;Breathing techniques;Relaxation;Repositioning     ACTIVITY TOLERANCE addressed;SCDs in place; Alarm set; Family present    ASSESSMENT   Pt seen this day for self care ADLs and functional transfers. Pt continues to progress with supine > sit CGA, and sit > stand SUPV. Pt continues to require MAX A for LB dressing.  The pt renée Signed    :  Nisha Shea, SLP (SPEECH-LANGUAGE PATHOLOGIST)       Order received for bedside swallow evaluation d/t pt's c/o difficulty swallowing pills.  Pt recently seen for bedside swallow evaluation 11/29/19 with recommendation for pills

## (undated) NOTE — IP AVS SNAPSHOT
[address]            (For Outpatient Use Only) Initial Admit Date: 11/24/2019   Inpt/Obs Admit Date: Inpt: 11/24/19 / Obs: N/A   Discharge Date:    Link Pattee:  [de-identified]   MRN: [de-identified]   CSN: [id number]   CEID: [id number] Group Number:  Insurance Type:    Subscriber Name:  Subscriber :    Subscriber ID:  Pt Rel to Subscriber:    Hospital Account Financial Class: Medicare    December 10, 2019

## (undated) NOTE — ED AVS SNAPSHOT
Karely Waite   MRN: [id number]    Department:  [facility] Emergency Department   Date of Visit:  6/5/2019           Disclosure     Insurance plans vary and the physician(s) referred by the ER may not be covered by your plan.  Please contact yo tell this physician (or your personal doctor if your instructions are to return to your personal doctor) about any new or lasting problems. The primary care or specialist physician will see patients referred from the [facility] Emergency Department.  Vadim Noonan

## (undated) NOTE — ED AVS SNAPSHOT
Susan Wilhelm   MRN: [id number]    Department:  [facility] Emergency Department   Date of Visit:  4/19/2019           Disclosure     Insurance plans vary and the physician(s) referred by the ER may not be covered by your plan.  Please contact y tell this physician (or your personal doctor if your instructions are to return to your personal doctor) about any new or lasting problems. The primary care or specialist physician will see patients referred from the [facility] Emergency Department.  Agustín Salcido